# Patient Record
Sex: MALE | Race: WHITE | NOT HISPANIC OR LATINO | Employment: FULL TIME | URBAN - NONMETROPOLITAN AREA
[De-identification: names, ages, dates, MRNs, and addresses within clinical notes are randomized per-mention and may not be internally consistent; named-entity substitution may affect disease eponyms.]

---

## 2018-01-31 ENCOUNTER — DOCUMENTATION ONLY (OUTPATIENT)
Dept: FAMILY MEDICINE | Facility: OTHER | Age: 58
End: 2018-01-31

## 2018-01-31 RX ORDER — PERMETHRIN 50 MG/G
CREAM TOPICAL
COMMUNITY
Start: 2016-12-14 | End: 2021-05-27

## 2018-05-04 ENCOUNTER — OFFICE VISIT (OUTPATIENT)
Dept: FAMILY MEDICINE | Facility: OTHER | Age: 58
End: 2018-05-04
Attending: NURSE PRACTITIONER
Payer: COMMERCIAL

## 2018-05-04 VITALS
WEIGHT: 249.38 LBS | HEIGHT: 71 IN | DIASTOLIC BLOOD PRESSURE: 82 MMHG | SYSTOLIC BLOOD PRESSURE: 120 MMHG | BODY MASS INDEX: 34.91 KG/M2 | TEMPERATURE: 98 F | HEART RATE: 60 BPM
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
## 2018-05-04 PROCEDURE — 99213 OFFICE O/P EST LOW 20 MIN: CPT | Performed by: NURSE PRACTITIONER

## 2018-05-04 RX ORDER — DOXYCYCLINE 100 MG/1
200 CAPSULE ORAL ONCE
Qty: 2 CAPSULE | Refills: 0 | Status: SHIPPED | OUTPATIENT
Start: 2018-05-04 | End: 2018-05-04

## 2018-05-04 NOTE — NURSING NOTE
Patient presents to the clinic for tick bite on his back that has been there since Tuesday. Patient states his wife says its embedded and had a hard time getting the tick out.  Shannon ALLEN CMA.......5/4/2018..3:57 PM

## 2018-05-04 NOTE — MR AVS SNAPSHOT
After Visit Summary   5/4/2018    Melchor Sadler    MRN: [id number]           Patient Information     Date Of Birth          1960        Visit Information        Provider Department      5/4/2018 4:00 PM Melissa Barnes NP [facility]        Today's Diagnoses     Tick bite, initial encounter    -  1      Care Instructions    Per the CDC recommendations, a full course of antimicrobial treatment, as used in the treatment of active Lyme disease (i.e., 10-14 days), is NOT recommended for prevention of Lyme disease after a recognized tick bite in the absence of clinical symptoms. A single dose of doxycycline (200 mg) may be offered to adult patients and to children >8 years of age (4 mg/kg up to a maximum dose of 200 mg) when ALL of the following conditions exist.    1. The attached tick is a black-legged tick (deer tick, Ixodes scapularis). Tick identification is most accurately performed by an individual trained in this discipline. However, black-legged ticks are very common in [place] and there are many images available to help in general identification.    2. The tick has been attached for at least 36 hours. This determination is most reliably made by an entomologist, but simply asking the patient about outdoor activity in the time before the tick bite was noticed can often lead to an accurate estimate of attachment time. Unengorged (unfed) black-legged ticks are typically flat. Any deviation from this  flatness,  which is often accompanied by a change in color from brick red to a gray or brown, is an indication that the tick has been feeding.    3. Prophylaxis can be started within 72 hours of the time that the tick was removed. This time limit is suggested because of an absence of data on the efficacy of prophylaxis for tick bites following longer time intervals after tick removal.    4. Doxycycline treatment is not contraindicated. Doxycycline is contraindicated in pregnant  "women and children less than 8 years old. The other common antibiotic treatment for Lyme disease, amoxicillin, should NOT be used for prophylaxis because of an absence of data on an effective short-course regimen for prophylaxis and the likely need for a multiday regimen and its associated adverse effects.            Follow-ups after your visit        Who to contact     If you have questions or need follow up information about today's clinic visit or your schedule please contact [facility] [place] [facility] directly at [phone].  Normal or non-critical lab and imaging results will be communicated to you by [employer], letter or phone within 4 business days after the clinic has received the results. If you do not hear from us within 7 days, please contact the clinic through [employer] or phone. If you have a critical or abnormal lab result, we will notify you by phone as soon as possible.  Submit refill requests through [employer]ogogo or call your pharmacy and they will forward the refill request to us. Please allow 3 business days for your refill to be completed.          Additional Information About Your Visit        vogogo Information     [employer]ogo lets you send messages to your doctor, view your test results, renew your prescriptions, schedule appointments and more. To sign up, go to [email or web address] . Click on \"Log in\" on the left side of the screen, which will take you to the Welcome page. Then click on \"Sign up Now\" on the right side of the page.     You will be asked to enter the access code listed below, as well as some personal information. Please follow the directions to create your username and password.     Your access code is: [email or web address]  Expires: 2018  4:10 PM     Your access code will  in 90 days. If you need help or a new code, please call your [facility] or [phone].        Care EveryWhere ID     This is your Care EveryWhere ID. This could be used by other organizations to " "access your [facility] medical records  [id number]        Your Vitals Were     Pulse Temperature Height BMI (Body Mass Index)          60 98  F (36.7  C) (Tympanic) 5' 10.5\" (1.791 m) 35.28 kg/m2         Blood Pressure from Last 3 Encounters:   05/04/18 120/82   12/14/16 128/80   07/20/16 136/82    Weight from Last 3 Encounters:   05/04/18 249 lb 6 oz (113.1 kg)   12/14/16 238 lb 8 oz (108.2 kg)   07/20/16 231 lb 3.2 oz (104.9 kg)              Today, you had the following     No orders found for display         Today's Medication Changes          These changes are accurate as of 5/4/18  4:11 PM.  If you have any questions, ask your nurse or doctor.               Start taking these medicines.        Dose/Directions    doxycycline 100 MG capsule   Commonly known as:  VIBRAMYCIN   Used for:  Tick bite, initial encounter   Started by:  Melissa Barnes NP        Dose:  200 mg   Take 2 capsules (200 mg) by mouth once for 1 dose   Quantity:  2 capsule   Refills:  0            Where to get your medicines      These medications were sent to [facility] [address] [place] [id number]     Phone:  [phone]     doxycycline 100 MG capsule                Primary Care Provider Office Phone # Fax #    Gerda KATHLEEN Andrez Schaefer [phone]       [address] [facility] [address]        Equal Access to Services     [facility] Hospital[facility]AN DAVID AH: Hadii loreta ku hadasho Soomaali, waaxda luqadaha, qaybta kaalmada adeegyada, liliana scales . So [facility] [phone].    ATENCIÓN: Si ramonla lauren, tiene a whitlock disposición servicios gratuitos de asistencia lingüística. Llame al [phone].    We comply with applicable federal civil rights laws and [place] laws. We do not discriminate on the basis of race, color, national origin, age, disability, sex, sexual orientation, or gender identity.            Thank you!     Thank you for " choosing [facility]  for your care. Our goal is always to provide you with excellent care. Hearing back from our patients is one way we can continue to improve our services. Please take a few minutes to complete the written survey that you may receive in the mail after your visit with us. Thank you!             Your Updated Medication List - Protect others around you: Learn how to safely use, store and throw away your medicines at www.disposemymeds.org.          This list is accurate as of 5/4/18  4:11 PM.  Always use your most recent med list.                   Brand Name Dispense Instructions for use Diagnosis    doxycycline 100 MG capsule    VIBRAMYCIN    2 capsule    Take 2 capsules (200 mg) by mouth once for 1 dose    Tick bite, initial encounter       permethrin 5 % cream    ELIMITE     Apply as directed from neck down and leave on over night, can retreat in 2 weeks if needed

## 2018-05-04 NOTE — PROGRESS NOTES
"Nursing Notes:   Shannon Concepcion CMA  5/4/2018  4:09 PM  Signed  Patient presents to the clinic for tick bite on his back that has been there since Tuesday. Patient states his wife says its embedded and had a hard time getting the tick out.  Shannon ALLEN CMA.......5/4/2018..3:57 PM      SUBJECTIVE:   Melchor Sadler is a 57 year old male who presents to clinic today for the following health issues:    Deer tick bite      Duration: Feels it was attached maybe since Tuesday. Wife removed a non engorged deer tick today     Description (location/character/radiation): Bite located on RT lower scapula area.     Intensity:  mild    Accompanying signs and symptoms: Denies fevers, chills, cough, s/s of systemic illness.     History (similar episodes/previous evaluation): None    Precipitating or alleviating factors: None    Therapies tried and outcome: None         Problem list and histories reviewed & adjusted, as indicated.  Additional history: as documented    Current Outpatient Prescriptions   Medication Sig Dispense Refill     doxycycline (VIBRAMYCIN) 100 MG capsule Take 2 capsules (200 mg) by mouth once for 1 dose 2 capsule 0     permethrin (ELIMITE) 5 % cream Apply as directed from neck down and leave on over night, can retreat in 2 weeks if needed       Allergies   Allergen Reactions     Codeine Other (See Comments)     Anxiety     Penicillins Anxiety       Reviewed and updated as needed this visit by clinical staff  Tobacco  Allergies  Meds  Med Hx  Surg Hx  Fam Hx  Soc Hx      Reviewed and updated as needed this visit by Provider         ROS:  A comprehensive 10 point ROS was obtained and documented for notable findings in the HPI.       OBJECTIVE:     /82 (BP Location: Left arm, Patient Position: Sitting, Cuff Size: Adult Large)  Pulse 60  Temp 98  F (36.7  C) (Tympanic)  Ht 5' 10.5\" (1.791 m)  Wt 249 lb 6 oz (113.1 kg)  BMI 35.28 kg/m2  Body mass index is 35.28 kg/(m^2).  GENERAL: healthy, alert and no " distress  EYES: Eyes grossly normal to inspection  RESP: non labored  SKIN: Erythemic papule on the LT lower edge of the scapula.     Diagnostic Test Results:  none     ASSESSMENT/PLAN:     1. Tick bite, initial encounter  - doxycycline (VIBRAMYCIN) 100 MG capsule; Take 2 capsules (200 mg) by mouth once for 1 dose  Dispense: 2 capsule; Refill: 0    Medical Decision Making:    Differential Diagnosis:  None    Serious Comorbid Conditions:  Adult:  None    PLAN:    Rash:  antibiotic  OTC hydrocortisone prn  moisturizer  Reassurance was given to the patient    Followup:    If not improving or if condition worsens, follow up with your Primary Care Provider        Melissa Barnes NP, 5/4/2018 4:09 PM

## 2018-05-04 NOTE — PATIENT INSTRUCTIONS
Per the CDC recommendations, a full course of antimicrobial treatment, as used in the treatment of active Lyme disease (i.e., 10-14 days), is NOT recommended for prevention of Lyme disease after a recognized tick bite in the absence of clinical symptoms. A single dose of doxycycline (200 mg) may be offered to adult patients and to children >8 years of age (4 mg/kg up to a maximum dose of 200 mg) when ALL of the following conditions exist.    1. The attached tick is a black-legged tick (deer tick, Ixodes scapularis). Tick identification is most accurately performed by an individual trained in this discipline. However, black-legged ticks are very common in [place] and there are many images available to help in general identification.    2. The tick has been attached for at least 36 hours. This determination is most reliably made by an entomologist, but simply asking the patient about outdoor activity in the time before the tick bite was noticed can often lead to an accurate estimate of attachment time. Unengorged (unfed) black-legged ticks are typically flat. Any deviation from this  flatness,  which is often accompanied by a change in color from brick red to a gray or brown, is an indication that the tick has been feeding.    3. Prophylaxis can be started within 72 hours of the time that the tick was removed. This time limit is suggested because of an absence of data on the efficacy of prophylaxis for tick bites following longer time intervals after tick removal.    4. Doxycycline treatment is not contraindicated. Doxycycline is contraindicated in pregnant women and children less than 8 years old. The other common antibiotic treatment for Lyme disease, amoxicillin, should NOT be used for prophylaxis because of an absence of data on an effective short-course regimen for prophylaxis and the likely need for a multiday regimen and its associated adverse effects.

## 2021-05-02 ENCOUNTER — ALLIED HEALTH/NURSE VISIT (OUTPATIENT)
Dept: FAMILY MEDICINE | Facility: OTHER | Age: 61
End: 2021-05-02
Attending: FAMILY MEDICINE
Payer: COMMERCIAL

## 2021-05-02 DIAGNOSIS — R43.0 LOSS OF SENSE OF SMELL: ICD-10-CM

## 2021-05-02 DIAGNOSIS — R05.9 COUGH: Primary | ICD-10-CM

## 2021-05-02 LAB
SARS-COV-2 RNA RESP QL NAA+PROBE: NORMAL
SPECIMEN SOURCE: NORMAL

## 2021-05-02 PROCEDURE — U0003 INFECTIOUS AGENT DETECTION BY NUCLEIC ACID (DNA OR RNA); SEVERE ACUTE RESPIRATORY SYNDROME CORONAVIRUS 2 (SARS-COV-2) (CORONAVIRUS DISEASE [COVID-19]), AMPLIFIED PROBE TECHNIQUE, MAKING USE OF HIGH THROUGHPUT TECHNOLOGIES AS DESCRIBED BY CMS-2020-01-R: HCPCS | Mod: ZL | Performed by: FAMILY MEDICINE

## 2021-05-02 PROCEDURE — C9803 HOPD COVID-19 SPEC COLLECT: HCPCS

## 2021-05-02 PROCEDURE — U0005 INFEC AGEN DETEC AMPLI PROBE: HCPCS | Mod: ZL | Performed by: FAMILY MEDICINE

## 2021-05-03 ENCOUNTER — TELEPHONE (OUTPATIENT)
Dept: FAMILY MEDICINE | Facility: OTHER | Age: 61
End: 2021-05-03

## 2021-05-03 LAB
LABORATORY COMMENT REPORT: ABNORMAL
SARS-COV-2 RNA RESP QL NAA+PROBE: POSITIVE
SPECIMEN SOURCE: ABNORMAL

## 2021-05-03 NOTE — TELEPHONE ENCOUNTER
"-Coronavirus (COVID-19) Notification    Caller Name (Patient, parent, daughter/son, grandparent, etc)  Melchor Sadler    Reason for call  Notify of Positive Coronavirus (COVID-19) lab results, assess symptoms,  review  [facility] recommendations    Lab Result    Lab test:  2019-nCoV rRt-PCR or SARS-CoV-2 PCR    Oropharyngeal AND/OR nasopharyngeal swabs is POSITIVE for 2019-nCoV RNA/SARS-COV-2 PCR (COVID-19 virus)    RN Recommendations/Instructions per [facility] Coronavirus COVID-19 recommendations    Brief introduction script  Introduce self then review script:  \"I am calling on behalf of [employer].  We were notified that your Coronavirus test (COVID-19) for was POSITIVE for the virus.  I have some information to relay to you but first I wanted to mention that the [place] Dept of Health will be contacting you shortly [it's possible MD already called Patient] to talk to you more about how you are feeling and other people you have had contact with who might now also have the virus.  Also,  [employer] is Partnering with the [facility] for Covid-19 research, you may be contacted directly by research staff.\"    Assessment (Inquire about Patient's current symptoms)   Assessment   Current Symptoms at time of phone call: (if no symptoms, document No symptoms] Fatigue, shortness of breath, chest feels heaving, cough, loss taste and smeil   Symptoms onset (if applicable) 4/23     If at time of call, Patients symptoms hare worsened, the Patient should contact 911 or have someone drive them to Emergency Dept promptly:      If Patient calling 911, inform 911 personal that you have tested positive for the Coronavirus (COVID-19).  Place mask on and await 911 to arrive.    If Emergency Dept, If possible, please have another adult drive you to the Emergency Dept but you need to wear mask when in contact with other people.      Monoclonal Antibody Administration    You may be eligible to receive a new " "treatment with a monoclonal antibody for preventing hospitalization in patients at high risk for complications from COVID-19.   This medication is still experimental and available on a limited basis; it is given through an IV and must be given at an infusion center. Please note that not all people who are eligible will receive the medication since it is in limited supply.     Are you interested in being considered for this medication?  No.   Does the patient fit the criteria: Patient declined    If patient qualifies based on above criteria:  \"You will be contacted if you are selected to receive this treatment in the next 1-2 business days.   This is time sensitive and if you are not selected in the next 1-2 business days, you will not receive the medication.  If you do not receive a call to schedule, you have not been selected.\"      Review information with Patient    Your result was positive. This means you have COVID-19 (coronavirus).  We have sent you a letter that reviews the information that I'll be reviewing with you now.    How can I protect others?    If you have symptoms: stay home and away from others (self-isolate) until:    You've had no fever--and no medicine that reduces fever--for 1 full day (24 hours). And       Your other symptoms have gotten better. For example, your cough or breathing has improved. And     At least 10 days have passed since your symptoms started. (If you've been told by a doctor that you have a weak immune system, wait 20 days.)     If you don't have symptoms: Stay home and away from others (self-isolate) until at least 10 days have passed since your first positive COVID-19 test. (Date test collected)    During this time:    Stay in your own room, including for meals. Use your own bathroom if you can.    Stay away from others in your home. No hugging, kissing or shaking hands. No visitors.     Don't go to work, school or anywhere else.     Clean  high touch  surfaces often " (doorknobs, counters, handles, etc.). Use a household cleaning spray or wipes. You'll find a full list on the EPA website at www.epa.gov/pesticide-registration/list-n-disinfectants-use-against-sars-cov-2.     Cover your mouth and nose with a mask, tissue or other face covering to avoid spreading germs.    Wash your hands and face often with soap and water.    Make a list of people you have been in close contact with recently, even if either of you wore a face covering.   ; Start your list from 2 days before you became ill or had a positive test.  ; Include anyone that was within 6 feet of you for a cumulative total of 15 minutes or more in 24 hours. (Example: if you sat next to Mandeep for 5 minutes in the morning and 10 minutes in the afternoon, then you were in close contact for 15 minutes total that day. Mandeep would be added to your list.)    A public health worker will call or text you. It is important that you answer. They will ask you questions about possible exposures to COVID-19, such as people you have been in direct contact with and places you have visited.    Tell the people on your list that you have COVID-19; they should stay away from others for 14 days starting from the last time they were in contact with you (unless you are told something different from a public health worker).     Caregivers in these groups are at risk for severe illness due to COVID-19:  o People 65 years and older  o People who live in a nursing home or long-term care facility  o People with chronic disease (lung, heart, cancer, diabetes, kidney, liver, immunologic)  o People who have a weakened immune system, including those who:  - Are in cancer treatment  - Take medicine that weakens the immune system, such as corticosteroids  - Had a bone marrow or organ transplant  - Have an immune deficiency  - Have poorly controlled HIV or AIDS  - Are obese (body mass index of 40 or higher)  - Smoke regularly    Caregivers should wear gloves  while washing dishes, handling laundry and cleaning bedrooms and bathrooms.    Wash and dry laundry with special caution. Don't shake dirty laundry, and use the warmest water setting you can.    If you have a weakened immune system, ask your doctor about other actions you should take.    For more tips, go to www.cdc.gov/coronavirus/2019-ncov/downloads/10Things.pdf.    You should not go back to work until you meet the guidelines above for ending your home isolation. You don't need to be retested for COVID-19 before going back to work--studies show that you won't spread the virus if it's been at least 10 days since your symptoms started (or 20 days, if you have a weak immune system).    Employers: This document serves as formal notice of your employee's medical guidelines for going back to work. They must meet the above guidelines before going back to work in person.    How can I take care of myself?    1. Get lots of rest. Drink extra fluids (unless a doctor has told you not to).    2. Take Tylenol (acetaminophen) for fever or pain. If you have liver or kidney problems, ask your family doctor if it's okay to take Tylenol.     Take either:     650 mg (two 325 mg pills) every 4 to 6 hours, or     1,000 mg (two 500 mg pills) every 8 hours as needed.     Note: Don't take more than 3,000 mg in one day. Acetaminophen is found in many medicines (both prescribed and over-the-counter medicines). Read all labels to be sure you don't take too much.    For children, check the Tylenol bottle for the right dose (based on their age or weight).    3. If you have other health problems (like cancer, heart failure, an organ transplant or severe kidney disease): Call your specialty clinic if you don't feel better in the next 2 days.    4. Know when to call 911: Emergency warning signs include:    Trouble breathing or shortness of breath    Pain or pressure in the chest that doesn't go away    Feeling confused like you haven't felt  before, or not being able to wake up    Bluish-colored lips or face    5. Sign up for [employer]. We know it's scary to hear that you have COVID-19. We want to track your symptoms to make sure you're okay over the next 2 weeks. Please look for an email from [employer]--this is a free, online program that we'll use to keep in touch. To sign up, follow the link in the email. Learn more at [email or web address].    Where can I get more information?    [facility]: [email or web address]    Coronavirus Basics: www.health.[facility].[place]./diseases/coronavirus/basics.html    What to Do If You're Sick: www.cdc.gov/coronavirus/2019-ncov/about/steps-when-sick.html    Ending Home Isolation: www.cdc.gov/coronavirus/2019-ncov/hcp/disposition-in-home-patients.html     Caring for Someone with COVID-19: www.cdc.gov/coronavirus/2019-ncov/if-you-are-sick/care-for-someone.html     [facility] clinical trials (COVID-19 research studies): [email or web address].[email or web address]-[email or web address]-trials     A Positive COVID-19 letter will be sent via [employer] or the mail. (Exception, no letters sent to Presurgerical/Preprocedure Patients)  Recommended to patient that he contact his primary Dr for further advise regarding the shortness of air he is feeling, patient is a non-smoker and has had the cough since 4/23/21  Zuly Gamez LPN

## 2021-05-03 NOTE — TELEPHONE ENCOUNTER
Coronavirus (COVID-19) Notification    Reason for call  Notify of POSITIVE  COVID-19 lab result, assess symptoms,  review [facility] recommendations    Lab Result   Lab test for 2019-nCoV rRt-PCR or SARS-COV-2 PCR  Oropharyngeal AND/OR nasopharyngeal swabs were POSITIVE for 2019-nCoV RNA [OR] SARS-COV-2 RNA (COVID-19) RNA     We have been unable to reach Patient by phone at this time to notify of their Positive COVID-19 result.  Left voicemail message requesting a call back to [phone] [facility] for results.        POSITIVE COVID-19 Letter sent.    Lissette Perez LPN

## 2021-05-27 ENCOUNTER — OFFICE VISIT (OUTPATIENT)
Dept: FAMILY MEDICINE | Facility: OTHER | Age: 61
End: 2021-05-27
Attending: NURSE PRACTITIONER
Payer: COMMERCIAL

## 2021-05-27 VITALS
SYSTOLIC BLOOD PRESSURE: 134 MMHG | RESPIRATION RATE: 12 BRPM | DIASTOLIC BLOOD PRESSURE: 90 MMHG | WEIGHT: 229.9 LBS | HEART RATE: 78 BPM | BODY MASS INDEX: 32.91 KG/M2 | HEIGHT: 70 IN | TEMPERATURE: 97.3 F

## 2021-05-27 DIAGNOSIS — R07.0 THROAT PAIN: Primary | ICD-10-CM

## 2021-05-27 LAB
SPECIMEN SOURCE: NORMAL
STREP GROUP A PCR: NOT DETECTED

## 2021-05-27 PROCEDURE — 87651 STREP A DNA AMP PROBE: CPT | Mod: ZL | Performed by: NURSE PRACTITIONER

## 2021-05-27 PROCEDURE — 99202 OFFICE O/P NEW SF 15 MIN: CPT | Performed by: NURSE PRACTITIONER

## 2021-05-27 ASSESSMENT — MIFFLIN-ST. JEOR: SCORE: 1864.07

## 2021-05-27 ASSESSMENT — PAIN SCALES - GENERAL: PAINLEVEL: MILD PAIN (3)

## 2021-05-27 NOTE — NURSING NOTE
"Chief Complaint   Patient presents with     Pharyngitis     2-3 days     Patient stated his throat has been sore for about 2-3 days now. Notices that the left gland is swollen and has some blood and mucus on it. Has a Hx of tonsillitis. States his throat is feeling a bit better today.    Initial BP (!) 134/90 (BP Location: Right arm, Patient Position: Sitting, Cuff Size: Adult Regular)   Pulse 78   Temp 97.3  F (36.3  C) (Tympanic)   Resp 12   Ht 1.778 m (5' 10\")   Wt 104.3 kg (229 lb 14.4 oz)   BMI 32.99 kg/m   Estimated body mass index is 32.99 kg/m  as calculated from the following:    Height as of this encounter: 1.778 m (5' 10\").    Weight as of this encounter: 104.3 kg (229 lb 14.4 oz).  Medication Reconciliation: Completed     Vj Mckeon LPN  "

## 2021-05-27 NOTE — PROGRESS NOTES
ASSESSMENT/PLAN:    I have reviewed the nursing notes.  I have reviewed the findings, diagnosis, plan and need for follow up with the patient.    1. Throat pain  - Group A Streptococcus PCR Throat Swab was negative tonight.   -Discussed with patient that symptoms and exam are consistent with viral illness or seasonal allergies. Recommended trying claritan or zyrtec for allergy symptoms relief.   -If strep is negative, try claritan for seasonal allergies and follow up if symptoms worsen or persist >10 days       -Symptomatic treatment - Encouraged fluids, salt water gargles, honey, elevation, humidifier, sinus rinse/netti pot, lozenges, tea, topical vapor rub, popsicles, rest, etc     -May use over-the-counter Tylenol or ibuprofen PRN    Discussed warning signs/symptoms indicative of need to f/u    Follow up if symptoms persist or worsen or concerns    I explained my diagnostic considerations and recommendations to the patient, who voiced understanding and agreement with the treatment plan. All questions were answered. We discussed potential side effects of any prescribed or recommended therapies, as well as expectations for response to treatments.    Natividad Braswell NP  5/27/2021  5:31 PM    HPI:  Melchor Sadler is a 60 year old male who presents to [facility] today for c/o sore throat and swollen gland on left side of throat 3 days, and some green nasal drainage. Denies fever/chills, ear pain, sinus pain/pressure, cough. Has hx tonsillitis in the past and tonsil stones but none now. He did have covid at beginning of the month but has since recovered from that without lingering symptoms. Otherwise negative review of systems. He would like to be swabbed for group A strep today. He is a [employer]  and exposed to children without a mask in [employer] on Sunday.     No past medical history on file.  No past surgical history on file.  Social History     Tobacco Use     Smoking status: Never Smoker     Smokeless  "tobacco: Never Used   Substance Use Topics     Alcohol use: No     Alcohol/week: 0.0 standard drinks     No current outpatient medications on file.     Allergies   Allergen Reactions     Codeine Other (See Comments)     Anxiety     Penicillins Anxiety     Past medical history, past surgical history, current medications and allergies reviewed and accurate to the best of my knowledge.      ROS:  Refer to HPI    BP (!) 134/90 (BP Location: Right arm, Patient Position: Sitting, Cuff Size: Adult Regular)   Pulse 78   Temp 97.3  F (36.3  C) (Tympanic)   Resp 12   Ht 1.778 m (5' 10\")   Wt 104.3 kg (229 lb 14.4 oz)   BMI 32.99 kg/m      EXAM:  General Appearance: Well appearing 60 year old male, appropriate appearance for age. No acute distress  Ears: Left TM intact, translucent with bony landmarks appreciated, no erythema, no effusion, no bulging, no purulence.  Right TM intact, translucent with bony landmarks appreciated, no erythema, no effusion, no bulging, no purulence.  Left auditory canal clear.  Right auditory canal clear.  Normal external ears, non tender.  Eyes: conjunctivae normal without erythema or irritation, corneas clear, no drainage or crusting, no eyelid swelling, pupils equal   Orophayrnx: erythema of posterior pharynx and tonsillar hypertrophy is present bilaterally. no exudates or petechiae, no post nasal drip seen, no trismus, voice clear.   moist mucous membranes.  Sinuses:  No sinus tenderness upon palpation of the frontal or maxillary sinuses  Nose:  Bilateral nares: no erythema, no edema, no drainage or congestion   Neck: one cervical lymph node is palpable on the left, otherwise supple  Respiratory: normal chest wall and respirations.  Normal effort.  Clear to auscultation bilaterally, no wheezing, crackles or rhonchi.  No increased work of breathing.  No cough appreciated.  Cardiac: RRR with no murmurs  Abdomen: soft, nontender, no rigidity, no rebound tenderness or guarding, normal bowel " sounds present  Musculoskeletal:  Equal movement of bilateral upper extremities.  Equal movement of bilateral lower extremities.  Normal gait.    Dermatological: no rashes noted of exposed skin  Psychological: normal affect, alert, oriented, and pleasant.     Labs:  Strep swab    Xray:  n/a

## 2022-07-22 ENCOUNTER — HOSPITAL ENCOUNTER (EMERGENCY)
Facility: OTHER | Age: 62
Discharge: HOME | End: 2022-07-22
Attending: EMERGENCY MEDICINE | Admitting: EMERGENCY MEDICINE
Payer: COMMERCIAL

## 2022-07-22 ENCOUNTER — APPOINTMENT (OUTPATIENT)
Dept: GENERAL RADIOLOGY | Facility: OTHER | Age: 62
End: 2022-07-22
Attending: EMERGENCY MEDICINE
Payer: COMMERCIAL

## 2022-07-22 VITALS
WEIGHT: 220 LBS | HEART RATE: 63 BPM | RESPIRATION RATE: 16 BRPM | BODY MASS INDEX: 32.58 KG/M2 | HEIGHT: 69 IN | DIASTOLIC BLOOD PRESSURE: 78 MMHG | TEMPERATURE: 97.9 F | SYSTOLIC BLOOD PRESSURE: 136 MMHG | OXYGEN SATURATION: 94 %

## 2022-07-22 DIAGNOSIS — M25.522 LEFT ELBOW PAIN: ICD-10-CM

## 2022-07-22 DIAGNOSIS — S22.32XA CLOSED FRACTURE OF ONE RIB OF LEFT SIDE, INITIAL ENCOUNTER: ICD-10-CM

## 2022-07-22 DIAGNOSIS — S01.01XA LACERATION OF SCALP WITHOUT FOREIGN BODY, INITIAL ENCOUNTER: ICD-10-CM

## 2022-07-22 DIAGNOSIS — R07.81 RIB PAIN ON LEFT SIDE: ICD-10-CM

## 2022-07-22 PROCEDURE — 12002 RPR S/N/AX/GEN/TRNK2.6-7.5CM: CPT | Performed by: EMERGENCY MEDICINE

## 2022-07-22 PROCEDURE — 250N000013 HC RX MED GY IP 250 OP 250 PS 637: Performed by: EMERGENCY MEDICINE

## 2022-07-22 PROCEDURE — 99283 EMERGENCY DEPT VISIT LOW MDM: CPT | Mod: 25 | Performed by: EMERGENCY MEDICINE

## 2022-07-22 PROCEDURE — 71101 X-RAY EXAM UNILAT RIBS/CHEST: CPT | Mod: TC,LT

## 2022-07-22 PROCEDURE — 250N000009 HC RX 250: Performed by: EMERGENCY MEDICINE

## 2022-07-22 PROCEDURE — 99284 EMERGENCY DEPT VISIT MOD MDM: CPT | Mod: 25 | Performed by: EMERGENCY MEDICINE

## 2022-07-22 RX ORDER — ACETAMINOPHEN 325 MG/1
975 TABLET ORAL ONCE
Status: COMPLETED | OUTPATIENT
Start: 2022-07-22 | End: 2022-07-22

## 2022-07-22 RX ADMIN — Medication: at 22:12

## 2022-07-22 RX ADMIN — ACETAMINOPHEN 975 MG: 325 TABLET, FILM COATED ORAL at 22:12

## 2022-07-22 RX ADMIN — IBUPROFEN 600 MG: 200 TABLET, FILM COATED ORAL at 22:12

## 2022-07-23 NOTE — ED PROVIDER NOTES
Emergency Department Provider Note  : 1960 Age: 61 year old Sex: male MRN: [id number]    Chief Complaint   Patient presents with     Head Laceration       Medical Decision Making / Assessment / Plan   61 year old male presenting with head laceration and left lower rib pain status post a fall.  Chest x-ray and rib x-ray show fracture of left rib 12.  His head laceration was cleaned and repaired.  The patient was managed with Tylenol and ibuprofen.  He was discharged with an incentive spirometer and encouraged to use Tylenol and ibuprofen as needed.  He declined a prescription for a stronger pain medicine.  He will need his staples out in approximately 7 days      Final diagnoses:   Laceration of scalp without foreign body, initial encounter   Rib pain on left side   Left elbow pain   Closed fracture of one rib of left side, initial encounter       Jose Magaña MD  2022   Emergency Department    Subjective   Melchor is a 61 year old male who presents at 10:02 PM with a laceration to the left side of his head and left-sided lower rib pain.  Patient was working on his deck and a board broke and he fell through the deck.  He struck his left lower ribs on a joist, struck his left elbow and cut his head.  He complains of severe pain to his left lower ribs.  Denies any LOC, denies any blood thinners, denies any medications or medical problems.  Has not had any pain medicine prior to arrival in the ER.  Complains of pain around the head laceration but no global headache.  Complains of mild pain around his left elbow.     His chest x-ray was negative for pneumothorax.  He did not have any abdominal tenderness so I have low suspicion for a splenic injury or other intra-abdominal trauma.    I have reviewed the Medications, Allergies, Past Medical and Surgical History, and Social History in the [facility] and with family.    Review of Systems:  See HPI for pertinent positives and negatives. All other systems  "reviewed and found to be negative.      Objective   BP: 136/78  Pulse: 63  Temp: 97.9  F (36.6  C)  Resp: 16  Height: 175.3 cm (5' 9\")  Weight: 99.8 kg (220 lb)  SpO2: 94 %    Physical Exam:   General: awake and alert, SO at bedside, guarded movements of torso  Head: normocephalic, atraumatic  Eyes:conjugate gaze  ENT: moist membranes  Chest/Respiratory: normal resp effort, TTP L lower posterior ribs, pain with AP compression, no pain with lateral compression  Cardiovascular: warm and well perfused  Back: no C,T,L spine TTP  Abdominal: soft, non-distended, non-tender  Extremities: mobility at baseline  Neurological: moving all extremities, normal speech, gait at baseline  Skin: approx 4cm laceration to L parietal scalp; superficial abrasion and bruising to posterior proximal forearm  Psychiatric: appropriate affect        Medical/Surgical History:  No past medical history on file.  No past surgical history on file.    Medications:  No current facility-administered medications for this encounter.     No current outpatient medications on file.       Allergies:  Codeine and Penicillins    Relevant labs, images, EKGs, Epic and outside hospital (if applicable) charts were reviewed. The findings, diagnosis, plan, and need for follow up were discussed with the patient/family. Nursing notes were reviewed.        Jose Magaña MD  07/22/22 3458    "

## 2022-07-23 NOTE — ED TRIAGE NOTES
"Pt fell through deck at home.  Has laceration on left side of head, and multiple abrasions on left side and arm.  Pt states \"Side hurts more than head.\"  Bleeding controlled, denies loss of consciousness or shortness of breath.      Triage Assessment     Row Name 07/22/22 5444       Triage Assessment (Adult)    Airway WDL WDL       Respiratory WDL    Respiratory WDL WDL       Skin Circulation/Temperature WDL    Skin Circulation/Temperature WDL WDL       Cardiac WDL    Cardiac WDL WDL       Peripheral/Neurovascular WDL    Peripheral Neurovascular WDL WDL       Cognitive/Neuro/Behavioral WDL    Cognitive/Neuro/Behavioral WDL WDL              "

## 2022-07-23 NOTE — ED PROVIDER NOTES
[facility]    -Laceration Repair    Date/Time: 7/22/2022 10:48 PM  Performed by: Jose Magaña MD  Authorized by: Jose Magaña MD     Risks, benefits and alternatives discussed.      ANESTHESIA (see MAR for exact dosages):     Anesthesia method:  Local infiltration    Local anesthetic:  Lidocaine 1% w/o epi  LACERATION DETAILS     Location:  Scalp    Scalp location:  L parietal    Length (cm):  4    REPAIR TYPE:     Repair type:  Simple      EXPLORATION:     Hemostasis achieved with:  Direct pressure    TREATMENT:     Area cleansed with:  Soap and water    Amount of cleaning:  Standard    Irrigation solution:  Tap water    Irrigation volume:  1L    SKIN REPAIR     Repair method:  Staples    Number of staples:  6    APPROXIMATION     Approximation:  Close    POST-PROCEDURE DETAILS     Dressing:  Antibiotic ointment        PROCEDURE    Patient Tolerance:  Patient tolerated the procedure well with no immediate complications         Jose Maagña MD  07/22/22 4129

## 2022-07-29 ENCOUNTER — ALLIED HEALTH/NURSE VISIT (OUTPATIENT)
Dept: FAMILY MEDICINE | Facility: OTHER | Age: 62
End: 2022-07-29
Attending: FAMILY MEDICINE
Payer: COMMERCIAL

## 2022-07-29 DIAGNOSIS — Z48.02 ENCOUNTER FOR REMOVAL OF SUTURES: Primary | ICD-10-CM

## 2022-07-29 NOTE — PROGRESS NOTES
Melchor Sadler presents to the clinic for removal of staples. The patient has had staples in place for 7 days. There has been no patient reported signs or symptoms of infection or drainage. 6  staples are seen and located on the HEAD. Tetanus status is up to date. All staples were easily removed today. Routine wound care discussed by the RN or provider. The patient will follow up as needed.    Suha Will RN on 7/29/2022 at 10:30 AM

## 2022-08-04 ENCOUNTER — OFFICE VISIT (OUTPATIENT)
Dept: FAMILY MEDICINE | Facility: OTHER | Age: 62
End: 2022-08-04
Attending: FAMILY MEDICINE
Payer: COMMERCIAL

## 2022-08-04 VITALS
WEIGHT: 226.3 LBS | BODY MASS INDEX: 33.52 KG/M2 | HEART RATE: 70 BPM | TEMPERATURE: 98.7 F | RESPIRATION RATE: 16 BRPM | SYSTOLIC BLOOD PRESSURE: 126 MMHG | DIASTOLIC BLOOD PRESSURE: 90 MMHG | OXYGEN SATURATION: 95 % | HEIGHT: 69 IN

## 2022-08-04 DIAGNOSIS — S22.32XD CLOSED FRACTURE OF ONE RIB OF LEFT SIDE WITH ROUTINE HEALING, SUBSEQUENT ENCOUNTER: Primary | ICD-10-CM

## 2022-08-04 DIAGNOSIS — S01.01XD LACERATION OF SCALP WITHOUT FOREIGN BODY, SUBSEQUENT ENCOUNTER: ICD-10-CM

## 2022-08-04 PROCEDURE — 99215 OFFICE O/P EST HI 40 MIN: CPT | Performed by: FAMILY MEDICINE

## 2022-08-04 ASSESSMENT — ENCOUNTER SYMPTOMS
DIZZINESS: 0
ARTHRALGIAS: 0
SHORTNESS OF BREATH: 0
PARESTHESIAS: 0
NUMBNESS: 0
LIGHT-HEADEDNESS: 0
JOINT SWELLING: 0
COUGH: 0
HEADACHES: 0
WEAKNESS: 0

## 2022-08-04 ASSESSMENT — PAIN SCALES - GENERAL: PAINLEVEL: MILD PAIN (3)

## 2022-08-04 NOTE — PROGRESS NOTES
Assessment & Plan     Closed fracture of one rib of left side with routine healing, subsequent encounter  Will likely take six weeks for full healing.  Based on his job description, he is not able to perform his normal duties.  FMLA and Sick Leave paperwork completed.  Please see scanned documents for detail.  He may return to work on 9/2/22 if symptoms resolved.  If he does not feel that his symptoms are completely resolved, he needs to return to clinic for further evaluation.    Laceration of scalp without foreign body, subsequent encounter  Well-healed.  No complications.  No lingering symptoms.    50 minutes spent on the date of the encounter doing chart review, history and exam, documentation and further activities per the note    Rula Medina,   [facility]    Subjective   Melchor is a 61 year old accompanied by his spouse, presenting for the following health issues:  RECHECK (Return to work)    History of Present Illness       Reason for visit:  Fractured ribs    He eats 2-3 servings of fruits and vegetables daily.He consumes 0 sweetened beverage(s) daily.He exercises with enough effort to increase his heart rate 9 or less minutes per day.  He exercises with enough effort to increase his heart rate 3 or less days per week.   He is taking medications regularly.       Pain History:  When did you first notice your pain? - Acute Pain   Have you seen anyone else for your pain? Yes in ER  Where in your body do you have pain? Back Pain  Onset/Duration: 07/22/2022  Description:   Location of pain: middle of back left  Character of pain: dull ache and intermittent  Pain radiation: none  New numbness or weakness in legs, not attributed to pain: no   Intensity: Currently 3/10, mild  Progression of Symptoms: improving  History:   Specific cause: turning/bending  Pain interferes with job: YES  History of back problems: no prior back problems  Any previous MRI or X-rays: Yes- at [facility].  Date  "7/22/2022  Sees a specialist for back pain: No  Alleviating factors:   Improved by: cold and sitting    Precipitating factors:  Worsened by: Lifting and Bending  Therapies tried and outcome: cold, NSAIDs and sitting    Accompanying Signs & Symptoms:  Risk of Cauda Equina: None  Risk of Infection: None  Risk of Cancer: None  Risk of Ankylosing Spondylitis: Onset at age <35, male, AND morning back stiffness No    Two weeks ago fell through his deck.  He fractured his left twelfth rib and got a head laceration which required staples.  He needs paperwork completed for his time away from work.  He is employed as a  at [employer], a job which requires him to exert up to 50 pounds of force occasionally, 40 pounds of force frequently, and 10 pounds of force constantly.  He stands for 10 hours per shift.  He has continued to have left side pain from his fractured rib.  Pain is present with movement, particularly getting out of bed, getting dressed, tying his shoes, and getting in and out of his truck.  He reports that his head injury has completely healed.  He has had his staples removed.  He has no lingering symptoms from his head injury.    Review of Systems   Respiratory: Negative for cough and shortness of breath.    Cardiovascular: Negative for chest pain.   Musculoskeletal: Negative for arthralgias and joint swelling.   Neurological: Negative for dizziness, weakness, light-headedness, numbness, headaches and paresthesias.          Objective    BP (!) 130/90   Pulse 70   Temp 98.7  F (37.1  C) (Tympanic)   Resp 16   Ht 1.753 m (5' 9\")   Wt 102.6 kg (226 lb 4.8 oz)   SpO2 95%   BMI 33.42 kg/m    Body mass index is 33.42 kg/m .  Physical Exam  Constitutional:       General: He is not in acute distress.     Appearance: Normal appearance. He is not ill-appearing.   Eyes:      Extraocular Movements: Extraocular movements intact.      Pupils: Pupils are equal, round, and reactive to light. "   Cardiovascular:      Rate and Rhythm: Normal rate and regular rhythm.      Heart sounds: No murmur heard.    No friction rub. No gallop.   Pulmonary:      Effort: Pulmonary effort is normal.      Breath sounds: Normal breath sounds. No wheezing, rhonchi or rales.   Chest:      Comments: Tenderness to palpation of left side in area of twelfth rib.  Skin:     Comments: Well-healed laceration of left anterior aspect of scalp.   Neurological:      General: No focal deficit present.      Mental Status: He is alert.      Cranial Nerves: No cranial nerve deficit.   Psychiatric:         Mood and Affect: Mood normal.

## 2022-08-04 NOTE — NURSING NOTE
"Chief Complaint   Patient presents with     RECHECK     Return to work         Initial BP (!) 126/90   Pulse 70   Temp 98.7  F (37.1  C) (Tympanic)   Resp 16   Ht 1.753 m (5' 9\")   Wt 102.6 kg (226 lb 4.8 oz)   SpO2 95%   BMI 33.42 kg/m   Estimated body mass index is 33.42 kg/m  as calculated from the following:    Height as of this encounter: 1.753 m (5' 9\").    Weight as of this encounter: 102.6 kg (226 lb 4.8 oz).     Advance Care Directive on file? no  Advance Care Directive provided to patient? no    FOOD SECURITY SCREENING QUESTIONS:    The next two questions are to help us understand your food security.  If you are feeling you need any assistance in this area, we have resources available to support you today.    Hunger Vital Signs:  Within the past 12 months we worried whether our food would run out before we got money to buy more. Never  Within the past 12 months the food we bought just didn't last and we didn't have money to get more. Never  Ester Hanna LPN,LPN on 8/4/2022 at 4:13 PM      Ester Hanna LPN   "

## 2022-08-16 ENCOUNTER — OFFICE VISIT (OUTPATIENT)
Dept: FAMILY MEDICINE | Facility: OTHER | Age: 62
End: 2022-08-16
Attending: PHYSICIAN ASSISTANT
Payer: COMMERCIAL

## 2022-08-16 VITALS
SYSTOLIC BLOOD PRESSURE: 128 MMHG | OXYGEN SATURATION: 94 % | HEIGHT: 69 IN | TEMPERATURE: 99 F | DIASTOLIC BLOOD PRESSURE: 76 MMHG | BODY MASS INDEX: 33.92 KG/M2 | WEIGHT: 229 LBS | RESPIRATION RATE: 14 BRPM | HEART RATE: 83 BPM

## 2022-08-16 DIAGNOSIS — R22.1 NECK MASS: Primary | ICD-10-CM

## 2022-08-16 PROCEDURE — 99213 OFFICE O/P EST LOW 20 MIN: CPT | Performed by: PHYSICIAN ASSISTANT

## 2022-08-16 ASSESSMENT — PAIN SCALES - GENERAL: PAINLEVEL: NO PAIN (0)

## 2022-08-16 NOTE — PROGRESS NOTES
"  Assessment & Plan     1. Neck mass  Left-sided neck mass that has been present for greater than 1 year, has been growing.  We will pursue CT for additional evaluation.  Will notify with results.  - CT Soft Tissue Neck w Contrast; Future      Return if symptoms worsen or fail to improve.    Sasha Garner PA-C  [facility]    Madeleine Roche is a 61 year old, presenting for the following health issues:  Facial Swelling      History of Present Illness       Reason for visit:  Fractured ribs    He eats 2-3 servings of fruits and vegetables daily.He consumes 0 sweetened beverage(s) daily.He exercises with enough effort to increase his heart rate 9 or less minutes per day.  He exercises with enough effort to increase his heart rate 3 or less days per week.   He is taking medications regularly.     Here for evaluation of a lump in his left neck that has been present since May 2021.  Reports more recently has seemed to become larger.  Is not painful or bothersome.  No associated overlying skin changes.  No fever/chills, weight changes, night sweats, respiratory symptoms, GI symptoms.    PAST MEDICAL HISTORY: History reviewed. No pertinent past medical history.    PAST SURGICAL HISTORY: History reviewed. No pertinent surgical history.    FAMILY HISTORY: History reviewed. No pertinent family history.    SOCIAL HISTORY:   Social History     Tobacco Use     Smoking status: Never Smoker     Smokeless tobacco: Never Used   Substance Use Topics     Alcohol use: No     Alcohol/week: 0.0 standard drinks        Allergies   Allergen Reactions     Codeine Other (See Comments)     Anxiety     Penicillins Anxiety     No current outpatient medications on file.     No current facility-administered medications for this visit.         Review of Systems   Per HPI        Objective    /76   Pulse 83   Temp 99  F (37.2  C)   Resp 14   Ht 1.753 m (5' 9\")   Wt 103.9 kg (229 lb)   SpO2 94%   BMI 33.82 " kg/m    Body mass index is 33.82 kg/m .  Physical Exam   General: Pleasant, in no apparent distress.  Eyes: Sclera are white and conjunctiva are clear bilaterally. Lacrimal apparatus free of erythema, edema, and discharge bilaterally.  Ears: External ears without erythema or edema.   Nose: External nose is symmetrical and free of lesions and deformities.   Neck: Palpable mass to left anterolateral neck below jawline approximately 1 inch x 1.5 inches in diameter.  No associated tenderness palpation, no overlying skin changes, warmth, bleeding or drainage.  Thyroid: Thyroid isthmus is palpable and midline. Lobes are palpable bilaterally but not enlarged.  Skin: No jaundice, pallor, rashes, or lesions.  Psych: Appropriate mood and affect.

## 2022-08-16 NOTE — NURSING NOTE
Patient presents to clinic with lump on left jaw area that he has had since last May 2021.  Radha Hanna, TARIK ....................  8/16/2022   2:42 PM

## 2022-08-23 ENCOUNTER — HOSPITAL ENCOUNTER (OUTPATIENT)
Dept: CT IMAGING | Facility: OTHER | Age: 62
Discharge: HOME | End: 2022-08-23
Attending: PHYSICIAN ASSISTANT | Admitting: PHYSICIAN ASSISTANT
Payer: COMMERCIAL

## 2022-08-23 DIAGNOSIS — R22.1 NECK MASS: ICD-10-CM

## 2022-08-23 PROCEDURE — 250N000011 HC RX IP 250 OP 636: Performed by: PHYSICIAN ASSISTANT

## 2022-08-23 PROCEDURE — 70491 CT SOFT TISSUE NECK W/DYE: CPT

## 2022-08-23 RX ORDER — IOPAMIDOL 755 MG/ML
100 INJECTION, SOLUTION INTRAVASCULAR ONCE
Status: COMPLETED | OUTPATIENT
Start: 2022-08-23 | End: 2022-08-23

## 2022-08-23 RX ADMIN — IOPAMIDOL 100 ML: 755 INJECTION, SOLUTION INTRAVENOUS at 16:00

## 2022-08-26 DIAGNOSIS — R22.1 NECK MASS: Primary | ICD-10-CM

## 2022-08-26 NOTE — PROGRESS NOTES
Order for ENT referral placed based on patient's recent visit and CT neck findings.     Sasha Garner PA-C on 8/26/2022 at 8:23 AM

## 2022-09-13 ENCOUNTER — TRANSFERRED RECORDS (OUTPATIENT)
Dept: HEALTH INFORMATION MANAGEMENT | Facility: CLINIC | Age: 62
End: 2022-09-13

## 2022-09-13 ENCOUNTER — OFFICE VISIT (OUTPATIENT)
Dept: OTOLARYNGOLOGY | Facility: OTHER | Age: 62
End: 2022-09-13
Attending: OTOLARYNGOLOGY
Payer: COMMERCIAL

## 2022-09-13 DIAGNOSIS — C10.9 MALIGNANT NEOPLASM OF OROPHARYNX, UNSPECIFIED (H): Primary | ICD-10-CM

## 2022-09-13 PROCEDURE — G0463 HOSPITAL OUTPT CLINIC VISIT: HCPCS

## 2022-09-14 NOTE — PROGRESS NOTES
document embedded image  Patient Name: Melchor Sadler    Address: [address] [place] [address]     YOB: 1960    [place] [address]    MR Number: [id number]    Phone: [phone]  PCP: Gerda Evans MD            Appointment Date: 09/13/22   Visit Provider: Jamey Houston MD    cc: Gerda Evans MD; Gerda Evans MD~    ENT Progress Note  Intake  Visit Reasons: Neck Mass    HPI  History of Present Illness  Chief complaint:  Left neck mass    History  The patient is a 61-year-old man who comes to the office today for assistance with a left neck mass.  He states he has noted this to be present and slowly enlarging over approximately year.  He has had no throat pain.  He denies dysphagia, odynophagia, hemoptysis, hematemesis, otalgia, voice change.  He is smoker.    Exam  He has a 3-4 cm firm jugulodigastric mass on the left.  I am unable to palpate other adenopathy at this time  Oral cavity oropharynx-he has a small crater like ulcer in his soft palate just above this tonsil on the left.  There are no other mucosal lesions  bimanual exam-there is a palpable firm tumor around the base of the crater of his left soft palate and superior tonsillar pillar.  This measures about a cm and half in diameter.  Nasal-no obstruction or purulence noted  Head neck integument-Clear  Indirect laryngoscopy-clear hypopharynx and larynx  General-patient appears well and in no distress  Neuro-there are no focal cranial nerve deficits    A&P  Assessment & Plan  (1) Oropharyngeal cancer:        Status: Acute        Code(s):  C10.9 - Malignant neoplasm of oropharynx, unspecified  The patient was counseled that he most certainly has a cancer involving his left tonsillar fossa and palate.  This has metastasized to his left neck.  We will make arrangements for panendoscopy and biopsy at his earliest convenience.  This will be followed by a PET scan.  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  He aylin not l likely require any radical surgery.  His questions were answered.  He was obviously disappointed with my impression.  I tried to encourage him that I think he is a high likelihood he will make it through this disease process.      Jamey Houston MD    09/13/22 0733    <Electronically signed by Jamey Houston MD> 09/13/22 7596

## 2022-09-15 ENCOUNTER — OFFICE VISIT (OUTPATIENT)
Dept: FAMILY MEDICINE | Facility: OTHER | Age: 62
End: 2022-09-15
Attending: STUDENT IN AN ORGANIZED HEALTH CARE EDUCATION/TRAINING PROGRAM
Payer: COMMERCIAL

## 2022-09-15 VITALS
WEIGHT: 235 LBS | BODY MASS INDEX: 34.8 KG/M2 | OXYGEN SATURATION: 95 % | HEIGHT: 69 IN | SYSTOLIC BLOOD PRESSURE: 140 MMHG | HEART RATE: 64 BPM | RESPIRATION RATE: 16 BRPM | DIASTOLIC BLOOD PRESSURE: 80 MMHG | TEMPERATURE: 98.4 F

## 2022-09-15 DIAGNOSIS — Z01.818 PREOP GENERAL PHYSICAL EXAM: Primary | ICD-10-CM

## 2022-09-15 LAB
ANION GAP SERPL CALCULATED.3IONS-SCNC: 7 MMOL/L (ref 3–14)
BUN SERPL-MCNC: 17 MG/DL (ref 7–25)
CALCIUM SERPL-MCNC: 9.9 MG/DL (ref 8.6–10.3)
CHLORIDE BLD-SCNC: 103 MMOL/L (ref 98–107)
CO2 SERPL-SCNC: 27 MMOL/L (ref 21–31)
CREAT SERPL-MCNC: 0.86 MG/DL (ref 0.7–1.3)
GFR SERPL CREATININE-BSD FRML MDRD: >90 ML/MIN/1.73M2
GLUCOSE BLD-MCNC: 92 MG/DL (ref 70–105)
HGB BLD-MCNC: 16.5 G/DL (ref 13.3–17.7)
POTASSIUM BLD-SCNC: 4 MMOL/L (ref 3.5–5.1)
SODIUM SERPL-SCNC: 137 MMOL/L (ref 134–144)

## 2022-09-15 PROCEDURE — 99213 OFFICE O/P EST LOW 20 MIN: CPT | Performed by: STUDENT IN AN ORGANIZED HEALTH CARE EDUCATION/TRAINING PROGRAM

## 2022-09-15 PROCEDURE — 36415 COLL VENOUS BLD VENIPUNCTURE: CPT | Mod: ZL | Performed by: STUDENT IN AN ORGANIZED HEALTH CARE EDUCATION/TRAINING PROGRAM

## 2022-09-15 PROCEDURE — 80048 BASIC METABOLIC PNL TOTAL CA: CPT | Mod: ZL | Performed by: STUDENT IN AN ORGANIZED HEALTH CARE EDUCATION/TRAINING PROGRAM

## 2022-09-15 PROCEDURE — 85018 HEMOGLOBIN: CPT | Mod: ZL | Performed by: STUDENT IN AN ORGANIZED HEALTH CARE EDUCATION/TRAINING PROGRAM

## 2022-09-15 ASSESSMENT — PAIN SCALES - GENERAL: PAINLEVEL: NO PAIN (0)

## 2022-09-15 NOTE — PATIENT INSTRUCTIONS
Preparing for Your Surgery  Getting started  A nurse will call you to review your health history and instructions. They will give you an arrival time based on your scheduled surgery time. Please be ready to share:    Your doctor's clinic name and phone number    Your medical, surgical and anesthesia history    A list of allergies and sensitivities    A list of medicines, including herbal treatments and over-the-counter drugs    Whether the patient has a legal guardian (ask how to send us the papers in advance)  Please tell us if you're pregnant--or if there's any chance you might be pregnant. Some surgeries may injure a fetus (unborn baby), so they require a pregnancy test. Surgeries that are safe for a fetus don't always need a test, and you can choose whether to have one.   If you have a child who's having surgery, please ask for a copy of Preparing for Your Child's Surgery.    Preparing for surgery    Within 30 days of surgery: Have a pre-op exam (sometimes called an H&P, or History and Physical). This can be done at a clinic or pre-operative center.  ? If you're having a , you may not need this exam. Talk to your care team.    At your pre-op exam, talk to your care team about all medicines you take. If you need to stop any medicines before surgery, ask when to start taking them again.  ? We do this for your safety. Many medicines can make you bleed too much during surgery. Some change how well surgery (anesthesia) drugs work.    Call your insurance company to let them know you're having surgery. (If you don't have insurance, call [phone].)    Call your clinic if there's any change in your health. This includes signs of a cold or flu (sore throat, runny nose, cough, rash, fever). It also includes a scrape or scratch near the surgery site.    If you have questions on the day of surgery, call your hospital or surgery center.  COVID testing  You may need to be tested for COVID-19 before having  surgery. If so, we will give you instructions.  Eating and drinking guidelines  For your safety: Unless your surgeon tells you otherwise, follow the guidelines below.    Eat and drink as usual until 8 hours before surgery. After that, no food or milk.    Drink clear liquids until 2 hours before surgery. These are liquids you can see through, like water, Gatorade and Propel Water. You may also have black coffee and tea (no cream or milk).    Nothing by mouth within 2 hours of surgery. This includes gum, candy and breath mints.    If you drink alcohol: Stop drinking it the night before surgery.    If your care team tells you to take medicine on the morning of surgery, it's okay to take it with a sip of water.  Preventing infection    Shower or bathe the night before and morning of your surgery. Follow the instructions your clinic gave you. (If no instructions, use regular soap.)    Don't shave or clip hair near your surgery site. We'll remove the hair if needed.    Don't smoke or vape the morning of surgery. You may chew nicotine gum up to 2 hours before surgery. A nicotine patch is okay.  ? Note: Some surgeries require you to completely quit smoking and nicotine. Check with your surgeon.    Your care team will make every effort to keep you safe from infection. We will:  ? Clean our hands often with soap and water (or an alcohol-based hand rub).  ? Clean the skin at your surgery site with a special soap that kills germs.  ? Give you a special gown to keep you warm. (Cold raises the risk of infection.)  ? Wear special hair covers, masks, gowns and gloves during surgery.  ? Give antibiotic medicine, if prescribed. Not all surgeries need antibiotics.  What to bring on the day of surgery    Photo ID and insurance card    Copy of your health care directive, if you have one    Glasses and hearing aides (bring cases)  ? You can't wear contacts during surgery    Inhaler and eye drops, if you use them (tell us about these when  you arrive)    CPAP machine or breathing device, if you use them    A few personal items, if spending the night    If you have . . .  ? A pacemaker, ICD (cardiac defibrillator) or other implant: Bring the ID card.  ? An implanted stimulator: Bring the remote control.  ? A legal guardian: Bring a copy of the certified (court-stamped) guardianship papers.  Please remove any jewelry, including body piercings. Leave jewelry and other valuables at home.  If you're going home the day of surgery    You must have a responsible adult drive you home. They should stay with you overnight as well.    If you don't have someone to stay with you, and you aren't safe to go home alone, we may keep you overnight. Insurance often won't pay for this.  After surgery  If it's hard to control your pain or you need more pain medicine, please call your surgeon's office.  Questions?   If you have any questions for your care team, list them here: _________________________________________________________________________________________________________________________________________________________________________ ____________________________________ ____________________________________ ____________________________________  For informational purposes only. Not to replace the advice of your health care provider. Copyright   2003, 2019 [facility]. All rights reserved. Clinically reviewed by Henny Max MD. [place] [id number] - REV 07/21.

## 2022-09-15 NOTE — NURSING NOTE
Patient presents to clinic for pre op exam.  Surgery with Dr. Houston at [facility] in [place] on 09/22/22 for biopsy left side neck mass.  Medication Rec Complete  Brittany Mascorro LPN............9/15/2022 3:28 PM

## 2022-09-15 NOTE — PROGRESS NOTES
[facility]  [address]  [place] [address]  Phone: [phone]  Fax: [phone]  Primary Provider: No Ref-Primary, Physician  Pre-op Performing Provider: IDA SMILEY      PREOPERATIVE EVALUATION:  Today's date: 9/15/2022    Melchor Sadler is a 61 year old male who presents for a preoperative evaluation.    Surgical Information:  Surgery/Procedure: Biopsy Left side neck mass  Surgery Location: [facility]  Surgeon: Dr. Houston  Surgery Date: 09/22/22  Time of Surgery: TBD  Where patient plans to recover: At home with family  Fax number for surgical facility: [phone]    Type of Anesthesia Anticipated: to be determined    Assessment & Plan     The proposed surgical procedure is considered LOW risk.    Preop general physical exam  Neck mass  No cardiac history. Labs in process   - Basic Metabolic Panel; Future  - Hemoglobin; Future  - Basic Metabolic Panel  - Hemoglobin    Possible Sleep Apnea: uses cpap machine          Risks and Recommendations:  The patient has the following additional risks and recommendations for perioperative complications:   - No identified additional risk factors other than previously addressed    Medication Instructions:  Patient is on no chronic medications    RECOMMENDATION:  APPROVAL GIVEN to proceed with proposed procedure, without further diagnostic evaluation.        Subjective     HPI related to upcoming procedure: Biopsy Neck Mass Left side      Preop Questions 9/15/2022   1. Have you ever had a heart attack or stroke? No   2. Have you ever had surgery on your heart or blood vessels, such as a stent placement, a coronary artery bypass, or surgery on an artery in your head, neck, heart, or legs? No   3. Do you have chest pain with activity? No   4. Do you have a history of  heart failure? No   5. Do you currently have a cold, bronchitis or symptoms of other infection? No   6. Do you have a cough, shortness of  breath, or wheezing? No   7. Do you or anyone in your family have previous history of blood clots? No   8. Do you or does anyone in your family have a serious bleeding problem such as prolonged bleeding following surgeries or cuts? No   9. Have you ever had problems with anemia or been told to take iron pills? No   10. Have you had any abnormal blood loss such as black, tarry or bloody stools? No   11. Have you ever had a blood transfusion? No   12. Are you willing to have a blood transfusion if it is medically needed before, during, or after your surgery? Yes   13. Have you or any of your relatives ever had problems with anesthesia? No   14. Do you have sleep apnea, excessive snoring or daytime drowsiness? YES - has cpap machine    14a. Do you have a CPAP machine? Yes   15. Do you have any artifical heart valves or other implanted medical devices like a pacemaker, defibrillator, or continuous glucose monitor? No   16. Do you have artificial joints? No   17. Are you allergic to latex? No     Health Care Directive:  Patient does not have a Health Care Directive or Living Will: Discussed advance care planning with patient; information given to patient to review.    Preoperative Review of :   reviewed - no record of controlled substances prescribed.      Status of Chronic Conditions:    SLEEP PROBLEM - Patient has a longstanding history of sleep apnea.. Patient has tried OTC medications with limited success.       Review of Systems  CONSTITUTIONAL: NEGATIVE for fever, chills, change in weight  INTEGUMENTARY/SKIN: NEGATIVE for worrisome rashes, moles or lesions  EYES: NEGATIVE for vision changes or irritation  ENT/MOUTH: NEGATIVE for ear, mouth and throat problems  RESP: NEGATIVE for significant cough or SOB  CV: NEGATIVE for chest pain, palpitations or peripheral edema  GI: NEGATIVE for nausea, abdominal pain, heartburn, or change in bowel habits  : NEGATIVE for frequency, dysuria, or  "hematuria  MUSCULOSKELETAL: NEGATIVE for significant arthralgias or myalgia  NEURO: NEGATIVE for weakness, dizziness or paresthesias  ENDOCRINE: NEGATIVE for temperature intolerance, skin/hair changes  HEME: NEGATIVE for bleeding problems  PSYCHIATRIC: NEGATIVE for changes in mood or affect    Patient Active Problem List    Diagnosis Date Noted     Family history of diverticulitis of colon 12/14/2016     Priority: Medium     Insomnia 03/05/2015     Priority: Medium      History reviewed. No pertinent past medical history.  History reviewed. No pertinent surgical history.  No current outpatient medications on file.       Allergies   Allergen Reactions     Codeine Other (See Comments)     Anxiety     Penicillins Anxiety        Social History     Tobacco Use     Smoking status: Never Smoker     Smokeless tobacco: Never Used   Substance Use Topics     Alcohol use: No     Alcohol/week: 0.0 standard drinks     History reviewed. No pertinent family history.  History   Drug Use No     Comment: Drug use: No         Objective     BP (!) 140/80   Pulse 64   Temp 98.4  F (36.9  C) (Tympanic)   Resp 16   Ht 1.753 m (5' 9\")   Wt 106.6 kg (235 lb)   SpO2 95%   BMI 34.70 kg/m      Physical Exam    GENERAL APPEARANCE: healthy, alert and no distress     EYES: EOMI,  PERRL     HENT: ear canals and TM's normal and nose and mouth without ulcers or lesions     NECK: left neck near jaw line mass     RESP: lungs clear to auscultation - no rales, rhonchi or wheezes     CV: regular rates and rhythm, normal S1 S2, no S3 or S4 and no murmur, click or rub     MS: extremities normal- no gross deformities noted, no evidence of inflammation in joints, FROM in all extremities.     SKIN: no suspicious lesions or rashes     NEURO: Normal strength and tone, sensory exam grossly normal, mentation intact and speech normal     PSYCH: mentation appears normal. and affect normal/bright      Diagnostics:  No results found for this or any previous " visit (from the past 24 hour(s)).   No EKG required, no history of coronary heart disease, significant arrhythmia, peripheral arterial disease or other structural heart disease.    Revised Cardiac Risk Index (RCRI):  The patient has the following serious cardiovascular risks for perioperative complications:   - No serious cardiac risks = 0 points     RCRI Interpretation: 0 points: Class I (very low risk - 0.4% complication rate)           Signed Electronically by: Maryam Torres MD  Copy of this evaluation report is provided to requesting physician.

## 2022-09-17 ENCOUNTER — HEALTH MAINTENANCE LETTER (OUTPATIENT)
Age: 62
End: 2022-09-17

## 2022-09-22 ENCOUNTER — TRANSFERRED RECORDS (OUTPATIENT)
Dept: HEALTH INFORMATION MANAGEMENT | Facility: OTHER | Age: 62
End: 2022-09-22

## 2022-10-04 ENCOUNTER — TRANSFERRED RECORDS (OUTPATIENT)
Dept: HEALTH INFORMATION MANAGEMENT | Facility: CLINIC | Age: 62
End: 2022-10-04

## 2022-10-04 ENCOUNTER — OFFICE VISIT (OUTPATIENT)
Dept: OTOLARYNGOLOGY | Facility: OTHER | Age: 62
End: 2022-10-04
Attending: OTOLARYNGOLOGY
Payer: COMMERCIAL

## 2022-10-04 DIAGNOSIS — C09.9 TONSIL CANCER (H): Primary | ICD-10-CM

## 2022-10-04 PROCEDURE — G0463 HOSPITAL OUTPT CLINIC VISIT: HCPCS

## 2022-10-10 ENCOUNTER — PATIENT OUTREACH (OUTPATIENT)
Dept: ONCOLOGY | Facility: OTHER | Age: 62
End: 2022-10-10

## 2022-10-10 ENCOUNTER — MEDICAL CORRESPONDENCE (OUTPATIENT)
Dept: PET IMAGING | Facility: HOSPITAL | Age: 62
End: 2022-10-10

## 2022-10-10 NOTE — PROGRESS NOTES
[facility]: Cancer Care                                                                                          TC to patient to inquire if he would like to see medical oncology in [place] or [place]. He would prefer to come to [place]. He is waiting on PET scan to be scheduled and is willing to wait until after the PET scan to be scheduled. Will watch for this to be scheduled and will schedule next available consult appointment to schedule. Patient will call as well when he finds out.     Signature:  Isabelle Barnes RN

## 2022-10-12 ENCOUNTER — TELEPHONE (OUTPATIENT)
Dept: RADIATION ONCOLOGY | Facility: HOSPITAL | Age: 62
End: 2022-10-12

## 2022-10-18 NOTE — PROGRESS NOTES
document embedded image  Patient Name: Melchor Sadler    Address: [address] [place] [address]     YOB: 1960    [place] [address]    MR Number: [id number]    Phone: [phone]  PCP: Gerda Evans MD            Appointment Date: 10/04/22   Visit Provider: Jamey Houston MD    cc: Gerda Evans MD; ~    ENT Progress Note  Intake  Visit Reasons: PO Biopsy of left palate    HPI  History of Present Illness  Chief complaint:  Postop check    History  The patient is a 61-year-old male who is here today for postop check following panendoscopy and biopsy.  His panendoscopy failed to reveal evidence of other tumor.  His biopsies did confirm his primary tumor in his left tonsil.  His staging at this point would be T1 N2 B M0.  He is had CTs of his neck and chest.  He is not had a PET scan to date.    Exam  Unchanged  He is healing well at his biopsy site    Allergies    codeine Adverse Reaction (Verified 09/22/22 12:35)  Anxiety  Penicillins Adverse Reaction (Verified 09/22/22 12:35)  Unknown    PFSH  PFSH:     Social History: (Reviewed 09/22/22 @ 14:45 by Jeramie Weaver MD)  Smoking Status:  Never smoker       A&P  Assessment & Plan  (1) Tonsil cancer:        Status: Acute        Code(s):  C09.9 - Malignant neoplasm of tonsil, unspecified  We had an in-depth discussion today about the implications of his tumor and treatment options.  We discussed sending him to [facility] for deescalation therapy but he was warned that this would include likely transoral robotic surgery, neck dissection, followed by chemotherapy and radiation therapy at reduced dose.  I likewise discuss the possibility of harm's seeking a 2nd opinion at the [facility].  I would recommend combined chemotherapy and radiation therapy I would expect a very high chance of cure given his HPV status.  I would hold back on surgery and use it only for salvage.  He will think about his options.  Leaning towards  being treated her local with Dr Griffin of Hematology Oncology and radiation therapy at [place].  He will call me with his final decision over the next week and I will make appropriate referrals.  He is understand that he will be following with me for a 5 year time span following completion of therapy.      Jamey Houston MD    10/03/22 1611    <Electronically signed by Jamey Houston MD> 10/10/22 1301

## 2022-10-25 ENCOUNTER — TELEPHONE (OUTPATIENT)
Dept: RADIATION ONCOLOGY | Facility: HOSPITAL | Age: 62
End: 2022-10-25

## 2022-10-26 ENCOUNTER — ONCOLOGY VISIT (OUTPATIENT)
Dept: RADIATION ONCOLOGY | Facility: HOSPITAL | Age: 62
End: 2022-10-26
Attending: STUDENT IN AN ORGANIZED HEALTH CARE EDUCATION/TRAINING PROGRAM
Payer: COMMERCIAL

## 2022-10-26 VITALS
SYSTOLIC BLOOD PRESSURE: 140 MMHG | TEMPERATURE: 98 F | HEART RATE: 59 BPM | RESPIRATION RATE: 18 BRPM | OXYGEN SATURATION: 95 % | DIASTOLIC BLOOD PRESSURE: 78 MMHG

## 2022-10-26 DIAGNOSIS — C09.9 MALIGNANT NEOPLASM OF TONSIL (H): Primary | ICD-10-CM

## 2022-10-26 PROCEDURE — 99205 OFFICE O/P NEW HI 60 MIN: CPT | Performed by: RADIOLOGY

## 2022-10-26 PROCEDURE — G0463 HOSPITAL OUTPT CLINIC VISIT: HCPCS

## 2022-10-26 ASSESSMENT — ENCOUNTER SYMPTOMS
DIZZINESS: 0
INSOMNIA: 0
SORE THROAT: 0
HEADACHES: 0
WEIGHT LOSS: 0
FEVER: 0
ABDOMINAL PAIN: 0
CHILLS: 0
SHORTNESS OF BREATH: 0

## 2022-10-26 ASSESSMENT — PAIN SCALES - GENERAL: PAINLEVEL: NO PAIN (0)

## 2022-10-26 ASSESSMENT — PATIENT HEALTH QUESTIONNAIRE - PHQ9: SUM OF ALL RESPONSES TO PHQ QUESTIONS 1-9: 0

## 2022-10-26 NOTE — PROGRESS NOTES
Patient was assessed using the NCCN psychosocial distress thermometer. Patient rated the score as 0/10. Patient denies current stressors to be brought to the attention of provider or Oncology RN Care Coordinator.

## 2022-10-26 NOTE — PROGRESS NOTES
[facility]    RADIATION ONCOLOGY CONSULTATION      Melchor Sadler  is referred by No ref. provider found for an oncology consultation.    PRIMARY PHYSICIAN: No Ref-Primary, Physician     Cancer Staging   No matching staging information was found for the patient.    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 Thus far his stage is cT1N1. Completion of his staging work-up with a PET/CT is pending.    We discussed at length with the patient and his wife the option of concurrent chemoradiation.  We reviewed the indications, logistics, risks, benefits and side effects (both short and long-term) of a course of combined modality therapy, presuming his PET does not reveal any distant metastatic disease.  The side effects/risks including, but not limited to, fatigue, skin changes including hair loss in the treated area, mucositis, dry mouth, taste changes, dysphagia, thickened saliva, taste changes and neck fibrosis were explained to them.  I also discussed with him the possibility of ipsilateral radiotherapy only based on what looks to be a well lateralized primary and ipsilateral neck nodes.  He understands that this will have to be finalized after his PET/CT. I have personally reviewed his CT of the neck from 8/23/2022.    After full discussion, the patient verbalized understanding and acceptance of the above.      PLAN: We will await the results of his PET/CT as well as his discussion with medical oncology.  We can then move forward with CT simulation for radiotherapy planning and subsequent initiation of treatment thereafter. His radiotherapy plan would be 70 Gy delivered in 35 fractions of 2 Gy each. Ipsilateral vs bilateral neck coverage is TBD by results of PET CT.   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     8/23/2022 CT soft tissue neck with contrast: Left jugulodigastric chain has a 3.8 cm everardo mass with a adjacent 2.3 cm enlarged node.  At the roof of the left tonsillar pillar there is a question mucosal mass    9/22/2022 left tonsil panendoscopy and biopsy: Reveals poorly differentiated squamous cell carcinoma, p16 positive.    10/04/2022 ENT: Recommends chemo/rads     11/02/2022 PET eyes to thighs: pending for 11/2/2022 11/03/2022 Medical oncology visit: pending for 11/3/2022    Today in the clinic he reports feeling fair overall. He denies dysphagia, odynophagia, sore throat or ear pain.     Port: No  Pacemaker: No  Hx of autoimmune disorders: No  Previous radiation therapy: No    Past Medical History:   Diagnosis Date     Malignant neoplasm of tonsil (H) 10/04/2022    Per [facility] Chart     Oropharyngeal cancer (H) 09/13/2022    Per [facility] Chart     GLENDA (obstructive sleep apnea) 09/22/2022    Per [facility] chart       Past Surgical History:   Procedure Laterality Date     Panendoscopy, biopsy left palate  09/22/2022    [facility]-Dr. Jamey Houston       Family History   Problem Relation Age of Onset     Lung Cancer Mother      Colon Cancer Maternal Grandmother        Social History     Tobacco Use     Smoking status: Never     Smokeless tobacco: Former     Types: Chew     Quit date: 1995   Substance Use Topics     Alcohol use: Not Currently         CURRENT MEDICATIONS:   No current outpatient medications on file.     No current facility-administered medications for this visit.         ALLERGIES:  Allergies   Allergen Reactions     Codeine Other (See Comments)     Anxiety     Penicillins Anxiety     Review of Systems   Constitutional: Negative for chills, fever, malaise/fatigue and weight loss.   HENT:  Negative for congestion, ear pain and sore throat.    Respiratory: Negative for shortness of breath.    Cardiovascular: Negative for chest pain.   Gastrointestinal: Negative for abdominal pain.   Neurological: Negative for dizziness and headaches.   Psychiatric/Behavioral: The patient does not have insomnia.      VITAL SIGNS:  BP (!) 140/78 (BP Location: Left arm, Patient Position: Sitting, Cuff Size: Adult Regular)   Pulse 59   Temp 98  F (36.7  C) (Tympanic)   Resp 18   SpO2 95%   Wt Readings from Last 4 Encounters:   09/15/22 106.6 kg (235 lb)   08/16/22 103.9 kg (229 lb)   08/04/22 102.6 kg (226 lb 4.8 oz)   07/22/22 99.8 kg (220 lb)       PHYSICAL EXAM:  Constitutional: awake, alert, cooperative, no apparent distress, and appears stated age  Eyes: Lids and lashes normal, sclera clear, conjunctiva normal  ENT: Normocephalic. Left exophytic but flat surfaced tonsillar mass appears to arise from/extend just anteriorly to the left ATP, posteriorly to the  PTP. The mass appears well lateralized and does not otherwise extend beyond the tonsillar fossa. Palpation confirms these findings. Left lateral firm neck mass.   Hematologic / Lymphatic: cervical lymphadenopathy noted - left level II/III fullness favoring a lymph node/everardo conglomerate. This is somewhat mobile and measures approximately 4 cm in max dimension.   Respiratory: No increased work of breathing, good air exchange, clear to auscultation bilaterally, no crackles or wheezing  Cardiovascular: Normal apical impulse, regular rate and rhythm, normal S1 and S2, no S3 or S4, and no murmur noted  GI: Normal bowel sounds, firm, non-distended, non-tender  Skin: No redness, warmth, or swelling otherwise.  Musculoskeletal: There is no redness, warmth, or swelling of the joints.  Full range of motion noted. Tone is normal.  Neurologic: Awake, alert, oriented to name, place and time. Gait is normal.  Neuropsychiatric: General: normal, calm and normal eye  contact      СЕРГЕЙ Méndez CNP    Time spent in interaction with the patient, review of medical records, documentation, care  coordination, review of imaging and physical examination: 70 minutes    Clive Restrepo MD  Radiation Oncologist    I saw this patient while providing locum tenens coverage.

## 2022-11-01 ENCOUNTER — TELEPHONE (OUTPATIENT)
Dept: PET IMAGING | Facility: HOSPITAL | Age: 62
End: 2022-11-01

## 2022-11-02 ENCOUNTER — HOSPITAL ENCOUNTER (OUTPATIENT)
Dept: PET IMAGING | Facility: HOSPITAL | Age: 62
Discharge: HOME | End: 2022-11-02
Attending: OTOLARYNGOLOGY | Admitting: OTOLARYNGOLOGY
Payer: COMMERCIAL

## 2022-11-02 DIAGNOSIS — C10.9 MALIGNANT NEOPLASM OF OROPHARYNX (H): ICD-10-CM

## 2022-11-02 DIAGNOSIS — C09.9 MALIGNANT NEOPLASM OF TONSIL (H): ICD-10-CM

## 2022-11-02 PROCEDURE — 343N000001 HC RX 343: Performed by: OTOLARYNGOLOGY

## 2022-11-02 PROCEDURE — 78815 PET IMAGE W/CT SKULL-THIGH: CPT | Mod: PI

## 2022-11-02 PROCEDURE — A9552 F18 FDG: HCPCS | Performed by: OTOLARYNGOLOGY

## 2022-11-02 RX ADMIN — FLUDEOXYGLUCOSE F-18 12.66 MCI.: 500 INJECTION, SOLUTION INTRAVENOUS at 10:43

## 2022-11-03 ENCOUNTER — TELEPHONE (OUTPATIENT)
Dept: ONCOLOGY | Facility: OTHER | Age: 62
End: 2022-11-03

## 2022-11-03 ENCOUNTER — ONCOLOGY VISIT (OUTPATIENT)
Dept: ONCOLOGY | Facility: OTHER | Age: 62
End: 2022-11-03
Attending: INTERNAL MEDICINE
Payer: COMMERCIAL

## 2022-11-03 VITALS
SYSTOLIC BLOOD PRESSURE: 152 MMHG | OXYGEN SATURATION: 95 % | TEMPERATURE: 97.8 F | BODY MASS INDEX: 35.82 KG/M2 | WEIGHT: 241.84 LBS | HEIGHT: 69 IN | HEART RATE: 64 BPM | RESPIRATION RATE: 19 BRPM | DIASTOLIC BLOOD PRESSURE: 84 MMHG

## 2022-11-03 DIAGNOSIS — C09.9 MALIGNANT NEOPLASM OF TONSIL (H): Primary | ICD-10-CM

## 2022-11-03 PROCEDURE — 99205 OFFICE O/P NEW HI 60 MIN: CPT | Performed by: INTERNAL MEDICINE

## 2022-11-03 RX ORDER — HEPARIN SODIUM,PORCINE 10 UNIT/ML
5 VIAL (ML) INTRAVENOUS
Status: CANCELLED | OUTPATIENT
Start: 2022-11-29

## 2022-11-03 RX ORDER — PALONOSETRON 0.05 MG/ML
0.25 INJECTION, SOLUTION INTRAVENOUS ONCE
Status: CANCELLED | OUTPATIENT
Start: 2022-11-29

## 2022-11-03 RX ORDER — HEPARIN SODIUM (PORCINE) LOCK FLUSH IV SOLN 100 UNIT/ML 100 UNIT/ML
5 SOLUTION INTRAVENOUS
Status: CANCELLED | OUTPATIENT
Start: 2022-11-29

## 2022-11-03 RX ORDER — ALBUTEROL SULFATE 90 UG/1
1-2 AEROSOL, METERED RESPIRATORY (INHALATION)
Status: CANCELLED
Start: 2022-11-29

## 2022-11-03 RX ORDER — ALBUTEROL SULFATE 0.83 MG/ML
2.5 SOLUTION RESPIRATORY (INHALATION)
Status: CANCELLED | OUTPATIENT
Start: 2022-11-29

## 2022-11-03 RX ORDER — MEPERIDINE HYDROCHLORIDE 25 MG/ML
25 INJECTION INTRAMUSCULAR; INTRAVENOUS; SUBCUTANEOUS EVERY 30 MIN PRN
Status: CANCELLED | OUTPATIENT
Start: 2022-11-29

## 2022-11-03 RX ORDER — METHYLPREDNISOLONE SODIUM SUCCINATE 125 MG/2ML
125 INJECTION, POWDER, LYOPHILIZED, FOR SOLUTION INTRAMUSCULAR; INTRAVENOUS
Status: CANCELLED
Start: 2022-11-29

## 2022-11-03 RX ORDER — LORAZEPAM 2 MG/ML
0.5 INJECTION INTRAMUSCULAR EVERY 4 HOURS PRN
Status: CANCELLED | OUTPATIENT
Start: 2022-11-29

## 2022-11-03 RX ORDER — EPINEPHRINE 1 MG/ML
0.3 INJECTION, SOLUTION, CONCENTRATE INTRAVENOUS EVERY 5 MIN PRN
Status: CANCELLED | OUTPATIENT
Start: 2022-11-29

## 2022-11-03 RX ORDER — DIPHENHYDRAMINE HYDROCHLORIDE 50 MG/ML
50 INJECTION INTRAMUSCULAR; INTRAVENOUS
Status: CANCELLED
Start: 2022-11-29

## 2022-11-03 ASSESSMENT — PAIN SCALES - GENERAL: PAINLEVEL: NO PAIN (0)

## 2022-11-03 NOTE — NURSING NOTE
"Oncology Rooming Note    November 3, 2022 1:03 PM   Melchor Sadler is a 62 year old male who presents for:    Chief Complaint   Patient presents with     Oncology Clinic Visit     New consult.Malignant neoplasm of tonsil      Initial Vitals: BP (!) 152/84   Pulse 64   Temp 97.8  F (36.6  C) (Tympanic)   Resp 19   Ht 1.753 m (5' 9\")   Wt 109.7 kg (241 lb 13.5 oz)   SpO2 95%   BMI 35.71 kg/m   Estimated body mass index is 35.71 kg/m  as calculated from the following:    Height as of this encounter: 1.753 m (5' 9\").    Weight as of this encounter: 109.7 kg (241 lb 13.5 oz). Body surface area is 2.31 meters squared.  No Pain (0) Comment: Data Unavailable   No LMP for male patient.  Allergies reviewed: Yes  Medications reviewed: Yes    Medications: Medication refills not needed today.  Pharmacy name entered into Fision: [facility] [address]              "

## 2022-11-03 NOTE — TELEPHONE ENCOUNTER
Called pt LVM advised to call back as he is scheduled for chemo ed need to advise of apt date and time

## 2022-11-03 NOTE — Clinical Note
Hi I forgot to ask you guys. Do you do a pretreatment audiometry here and also refer to speech and dietician before treatment for head and neck.

## 2022-11-03 NOTE — Clinical Note
Hi,  RT was waiting for pet which did not show any distant mets. Therefore plan for chemoRT. Can you please let them know as they are waiting for PET to sim him  Order for port placed and cisplatin. He will need chemoteaching.

## 2022-11-03 NOTE — PROGRESS NOTES
MEDICAL ONCOLOGY CONSULT NOTE  Nov 3, 2022    Reason for referral: Head and neck cancer.     Referring Provider: Dr. Houston    HISTORY OF PRESENT ILLNESS  Melchor Sadler is a 62 year old male with PMH as stated below who is seen in the oncology clinic for consultation of his head and neck cancer.     His history in short is as follows:    Mr. Sadler initially felt a swelling in his neck 1 year ago. It was monitored. He then underwent a CT scan when he was found to have laceration of the scalp.    8/23/2022: CT soft tissue neck mass: 3.8 cm probable everardo mass in the left jugulodigastric chain. Adjacent also enlarged 2.3 cm long axis node. Differential considerations favor  metastatic disease. A mucosal mass is questioned at the roof of the left tonsillar pillar.    He was evaluated by Dr. Houston at 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     Recommended PET scan and referral to radiation oncology and medical oncology for discussion regarding concurrent chemoRT.     10/26/2022: Radiation oncology consultation with recommendations for concurrent chemoRT.     11/2/2022: PET scan: IMPRESSION: Large left anterior cervical chain lymph node metastasismeasuring 4.0 x 3.5 cm in dimension. There are a few mildly enlarged  adjacent left anterior cervical chain lymph nodes with uptake not significantly greater than that of background uptake. No evidence of  distant metastatic disease otherwise.    Mr. Sadler is doing well. Denies any dysphagia, odynophagia. The left neck mass has increased slightly. Denies any pain. Denies any weight or appetite loss.     REVIEW OF SYSTEMS  A 12-point ROS negative except as in HPI      No current outpatient medications on file.       Allergies   Allergen Reactions     Codeine Other (See Comments)     Anxiety     Penicillins Anxiety     Immunization History   Administered  "Date(s) Administered     MMR 09/11/2009, 09/11/2009     Tdap (Adacel,Boostrix) 01/30/2013       Past Medical History:   Diagnosis Date     Malignant neoplasm of tonsil (H) 10/04/2022    Per [facility] Chart     Oropharyngeal cancer (H) 09/13/2022    Per [facility] Chart     GLENDA (obstructive sleep apnea) 09/22/2022    Per [facility] chart       Past Surgical History:   Procedure Laterality Date     Panendoscopy, biopsy left palate  09/22/2022    [facility]-Dr. Jamey Houston       SOCIAL HISTORY  History   Smoking Status     Never   Smokeless Tobacco     Former     Types: Chew     Quit date: 1995    Social History    Substance and Sexual Activity      Alcohol use: Not Currently     History   Drug Use No     Comment: Drug use: No       FAMILY HISTORY  Family History   Problem Relation Age of Onset     Lung Cancer Mother      Cancer Mother      Diabetes Father      Colon Cancer Maternal Grandmother      Hyperlipidemia No family hx of      Coronary Artery Disease No family hx of      Hypertension No family hx of      Breast Cancer No family hx of      Prostate Cancer No family hx of      Depression No family hx of      Anxiety Disorder No family hx of      Mental Illness No family hx of      Anesthesia Reaction No family hx of      Asthma No family hx of        PHYSICAL EXAMINATION  BP (!) 152/84   Pulse 64   Temp 97.8  F (36.6  C) (Tympanic)   Resp 19   Ht 1.753 m (5' 9\")   Wt 109.7 kg (241 lb 13.5 oz)   SpO2 95%   BMI 35.71 kg/m    Wt Readings from Last 2 Encounters:   11/03/22 109.7 kg (241 lb 13.5 oz)   09/15/22 106.6 kg (235 lb)     Physical Exam  Constitutional:       Appearance: He is obese.   HENT:      Mouth/Throat:      Mouth: Mucous membranes are moist.   Eyes:      Conjunctiva/sclera: Conjunctivae normal.   Lymphadenopathy:      Cervical: Cervical adenopathy present.      Left cervical: Superficial cervical adenopathy present.      Comments: 3 cm mass present under chin over superior left neck.  "   Neurological:      Mental Status: He is alert.     LABORATORY AND IMAGING     Latest Reference Range & Units 09/15/22 16:04   Sodium 134 - 144 mmol/L 137   Potassium 3.5 - 5.1 mmol/L 4.0   Chloride 98 - 107 mmol/L 103   Carbon Dioxide 21 - 31 mmol/L 27   Urea Nitrogen 7 - 25 mg/dL 17   Creatinine 0.70 - 1.30 mg/dL 0.86       ASSESSMENT AND PLAN    1. 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     Referred by ENT for discussion regarding concurrent chemoRT. Has met with radiation oncology. PET scan did not show any distant disease.     Discussed with Mr. Sadler and his wife regarding addition of chemotherapy with concurrent RT. I would recommend weekly cisplatin 40 mg weekly for the course of RT.    Expected side effects which include but not limited to nasuea, vomiting, diarrhea, low blood counts, infection, kidney or liver toxicity, allergic reactions, neuropathy, hearing loss.     He is agreeable to proceed.     I also discussed the possible need for feeding tube down the line if the loses a significant amount of weight. Discussed need for port. Referral placed.     All questions answered.     We will see him in follow up once he is ready to start treatment.     Total time spent on the patient was 60 minutes which included time spent reviewing lab results, performing history and physical, reviewing imaging results with the patient, documenting history and physical and ordering treatment.     Ching Pruitt MD

## 2022-11-04 NOTE — PROGRESS NOTES
Oncology Treatment Initiation/Therapy Education Visit:    Reason for Visit:  Melchor is a 62 year old gentleman with a new diagnosis of tonsillar cancer who presents today for chemotherapy education.     Nursing Note and documentation reviewed: Yes    HPI:    Melchor reports that he is doing well. Notes that physically, he has been doing fine since his diagnosis. 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 No recent signs of infection. No fever, chills, chest pain, cough, or SOB. No bleeding concerns. Denies nausea or vomiting. Appetite has been good, actually notes a a weight gain since his diagnosis. Notes he hasn't had difficulty swallowing. No limitations on what he has been able to eat, although trying to eat healthy. Notes he is a good water drinker. Bowels are regular. Energy levels have been good.     Oncologic History:   05/2021 First noticed left neck mass  08/16/2022 Seen by PCP, with complaints of left growing neck mass. CT ordered.   08/23/2022 CT soft tissue neck mass: 3.8 cm probable everardo mass in the left jugulodigastric chain. Adjacent also enlarged 2.3 cm long axis node. Differential considerations favor  metastatic disease. A mucosal mass is questioned at the roof of the left tonsillar pillar.  09/13/2022 Evaluated by Dr. Houston at [facility] who recommended perry endoscopy with biopsy of the left tonsil  09/22/2022 Underwent Panendoscopy with path showing poorly differentiated squamous carcinoma, p16 positive.   10/04/2022 Seen again by Dr. Houston who recommended PET scan and referral to radiation oncology and medical oncology for discussion regarding concurrent chemoRT.   10/26/2022 Met in consultation with Dr. Restrepo of [facility], who at the time, states his radiotherapy plan would be 70 Gy delivered in 35 fractions of 2 Gy each. Ipsilateral vs bilateral neck coverage is TBD by results of PET CT.   11/2/2022 PET scan IMPRESSION: Large left anterior cervical  chain lymph node metastasis measuring 4.0 x 3.5 cm in dimension. There are a few mildly enlarged adjacent left anterior cervical chain lymph nodes with uptake not significantly greater than that of background uptake. No evidence of distant metastatic disease otherwise.  11/03/2022 Met in consultation with Dr. Pruitt of [facility] who agreed with the the plan of concurrent chemorads, utilizing Cisplatin 40 mg weekly.   11/11/2022 Planned port placement  11/16/2022 Planned Rad Onc simulation    Treatment Goal: Curative    Planned [place]: Weekly Cisplatin 40 mg/m2 given concurrently with radiation therapy  Current Cycle: NA  Completed cycles: NA      Previous treatment:  N/A    Past Medical History:   Diagnosis Date     Malignant neoplasm of tonsil (H) 10/04/2022    Per [facility] Chart     Oropharyngeal cancer (H) 09/13/2022    Per [facility] Chart     GLENDA (obstructive sleep apnea) 09/22/2022    Per [facility] chart       Past Surgical History:   Procedure Laterality Date     Panendoscopy, biopsy left palate  09/22/2022    [facility]-Dr. Jamey Houston       Family History   Problem Relation Age of Onset     Lung Cancer Mother      Cancer Mother      Diabetes Father      Colon Cancer Maternal Grandmother      Hyperlipidemia No family hx of      Coronary Artery Disease No family hx of      Hypertension No family hx of      Breast Cancer No family hx of      Prostate Cancer No family hx of      Depression No family hx of      Anxiety Disorder No family hx of      Mental Illness No family hx of      Anesthesia Reaction No family hx of      Asthma No family hx of        Social History     Socioeconomic History     Marital status:      Spouse name: Sandra     Number of children: 2     Years of education: 16     Highest education level: Not on file   Occupational History     Employer: [employer]     Occupation: ASV   Tobacco Use     Smoking status: Never     Smokeless tobacco: Former     Types: Chew     Quit date: 1995   Vaping Use  "    Vaping Use: Never used   Substance and Sexual Activity     Alcohol use: Not Currently     Drug use: No     Comment: Drug use: No     Sexual activity: Yes     Partners: Female   Other Topics Concern     Parent/sibling w/ CABG, MI or angioplasty before 65F 55M? Not Asked   Social History Narrative     Not on file     Social Determinants of Health     Financial Resource Strain: Not on file   Food Insecurity: Not on file   Transportation Needs: Not on file   Physical Activity: Not on file   Stress: Not on file   Social Connections: Not on file   Intimate Partner Violence: Not on file   Housing Stability: Not on file       No current outpatient medications on file.     No current facility-administered medications for this visit.        Allergies   Allergen Reactions     Codeine Other (See Comments)     Anxiety     Penicillins Anxiety       Review Of Systems:  A complete review of systems is negative except for the above mentioned items in the interval history.     ECOG Performance Status: 0    Physical Exam:  /78   Pulse 68   Temp 97.8  F (36.6  C) (Tympanic)   Resp 18   Ht 1.753 m (5' 9\")   Wt 110.2 kg (242 lb 15.2 oz)   SpO2 94%   BMI 35.88 kg/m    GENERAL APPEARANCE: Healthy, alert and in no acute distress.  NEURO: Alert and oriented x 3.  Gait steady.  PSYCHIATRIC: Mentation and affect appear normal.  Mood appropriate.    Laboratory:  No results found for any visits on 11/07/22.    Imaging Studies:    PET ONCOLOGY (EYES TO THIGHS)     HISTORY: 62 yearsMale Malignant neoplasm of oropharynx (H); Malignant  neoplasm of tonsil (H)     TECHNIQUE: 12.66 millicuries FDG was injected intravenously. A  combined PET/CT scan was performed. CT imaging was performed without  contrast for localization purposes only.     COMPARISON: CT abdomen and pelvis 12/15/2016     FINDINGS:      Neck: There is a hypermetabolic left anterior cervical chain lymph  node measuring 4.0 cm AP, 3.5 cm transversely with peak SUV of " 18.6.  This is anteromedial to the sternocleidomastoid and just below the  level of the angle of the mandible.     Smaller lymph nodes adjacent to this within the left anterior chain  are present measuring up to 8 mm in short axis. These are  asymmetrically larger when compared to right-sided anterior cervical  chain lymph nodes. Uptake within these smaller left anterior chain  lymph nodes have peak SUV values of up to 3.0. This is not  significantly higher than that of background metabolic uptake.     There is no evidence of a pharyngeal or parapharyngeal mass otherwise.     CHEST:No suspicious hypermetabolism is present. There are nonacute  nondisplaced posterior lower left rib fractures.     Abdomen/pelvis:No suspicious hypermetabolism is present.                                                                         IMPRESSION: Large left anterior cervical chain lymph node metastasis  measuring 4.0 x 3.5 cm in dimension. There are a few mildly enlarged  adjacent left anterior cervical chain lymph nodes with uptake not  significantly greater than that of background uptake. No evidence of  distant metastatic disease otherwise.     SHARONDA DANGELO MD       Discussion:    Nutrition: Discussed, no issues currently but amenable to nutrition referral.   Cancer Rehab: Discussed and Declined. Has remained phyiscally active.      **Any Physical Limitations to exercise?  unknown  Palliative Care Program:  NA  Health Care Directive: Has completed at home, asked that he bring us a copy for our records.  Pharmacy consult review:  Not yet completed  Importance of Central line care (port) or IV site care.  Discussed and shown port. Scheduled for procedure this Friday.   Vaccines/Evusheld: Unvaccinated. Discussed given his liklihood of immunosuppression. Patient declined.   Speech Referral:Disccused, patient declined.   Audiology referral: Discussed, states he just had hearing test with his employer so declined referral.  "    Duration: x Cycles and rationale for strict adherence, specific supportive medication Compazine, Zofran and Dexamethasone and side effects (including long-term and short-term) and management; skin changes/hand-foot syndrome, anemia, neutropenia, thrombocytopenia, diarrhea/constipation, hair loss syndrome, memory changes/ \"chemobrain\", mouth sores, taste changes, neuropathy, fatigue, infertility, myelosuppression, and risk of extravasation or infiltration.    Infection prevention and monitoring of lab values, what lab tests and what changes of these values meant, along with the possibility of hydration or blood product transfusion, or the need to defer or hold treatment.      General Chemotherapy Information, including ways it is excreted from the body and cleaning and containment of vomitus or other bodily fluid, use of the bathroom, sexual health and intimacy, what to do if needing to miss a treatment, when to call a provider and the need for staff to wear protective equipment.    Hand Outs Given:  Given specific handouts regarding Getting Ready for Chemotherapy, Chemo-induced nausea and vomiting, Chemo-induced constipation, Chemo-induced diarrhea, Chemo-induced fatigue, and Chemo-induced mouth sores.     ASSESSMENT/PLAN:    1. 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 Patient presented today for education. We reviewed all possible side effects of Cisplatin. He consented to therapy. He will have his port placed Friday and simulation completed next week. Once Rad Onc knows of start date, we will get his calendar together. We did discuss the likelihood of weekly fluids and the need to follow-up with a provider each visit. I asked that he bring in copies of med list (specifically all his vitamins and the dosage) and a copy of his ACP so we can get it scanned into his chart. He will follow-up prior to week 1.     I encouraged patient to call with any questions or " concerns.    100 minutes spent in the patient's encounter today with time spent in review of the chart along with in chart preparation.  Time was spent in review of the treamtent plan.  Time was also spent obtaining a review of systems and performing a physical exam.  Time was spent in review of all planned medications for treatment with the patient and questions answered.     СЕРГЕЙ Garber [facility]  Medical Oncology

## 2022-11-07 ENCOUNTER — TELEPHONE (OUTPATIENT)
Dept: ONCOLOGY | Facility: OTHER | Age: 62
End: 2022-11-07

## 2022-11-07 ENCOUNTER — PREP FOR PROCEDURE (OUTPATIENT)
Dept: SURGERY | Facility: OTHER | Age: 62
End: 2022-11-07

## 2022-11-07 ENCOUNTER — TELEPHONE (OUTPATIENT)
Dept: SURGERY | Facility: OTHER | Age: 62
End: 2022-11-07
Payer: COMMERCIAL

## 2022-11-07 ENCOUNTER — ONCOLOGY VISIT (OUTPATIENT)
Dept: ONCOLOGY | Facility: OTHER | Age: 62
End: 2022-11-07
Attending: NURSE PRACTITIONER
Payer: COMMERCIAL

## 2022-11-07 VITALS
OXYGEN SATURATION: 94 % | RESPIRATION RATE: 18 BRPM | SYSTOLIC BLOOD PRESSURE: 138 MMHG | TEMPERATURE: 97.8 F | HEART RATE: 68 BPM | DIASTOLIC BLOOD PRESSURE: 78 MMHG | BODY MASS INDEX: 35.98 KG/M2 | HEIGHT: 69 IN | WEIGHT: 242.95 LBS

## 2022-11-07 DIAGNOSIS — C09.9 MALIGNANT NEOPLASM OF TONSIL (H): Primary | ICD-10-CM

## 2022-11-07 PROCEDURE — 99215 OFFICE O/P EST HI 40 MIN: CPT | Performed by: NURSE PRACTITIONER

## 2022-11-07 PROCEDURE — 99417 PROLNG OP E/M EACH 15 MIN: CPT | Performed by: NURSE PRACTITIONER

## 2022-11-07 RX ORDER — IBUPROFEN 400 MG/1
400 TABLET, FILM COATED ORAL EVERY 6 HOURS PRN
COMMUNITY
End: 2022-12-06

## 2022-11-07 ASSESSMENT — PAIN SCALES - GENERAL: PAINLEVEL: NO PAIN (0)

## 2022-11-07 NOTE — TELEPHONE ENCOUNTER
Referral received for port-a-cath placement for malignant neoplasm of tonsil.   Patient scheduled for port-a-cath placement on 11/11/22 with Dr. Avila at [facility].  Instructions given via phone and sent to patient via [employer].   COVID-19 test: at home  Preop: n/a  Rosa Shaffer LPN

## 2022-11-07 NOTE — PATIENT INSTRUCTIONS
Thank you for allowing me to be a part of your care today.    Plan on port placement this Friday and simulation next Wednesday. Once radiation has a start date, we will work on scheduling him for chemo.    If you have any questions please call [phone]    Other instructions:      Nutrition referral.     **Bring copies of med list and ACP.

## 2022-11-07 NOTE — NURSING NOTE
"Oncology Rooming Note    November 7, 2022 12:49 PM   Melchor Sadler is a 62 year old male who presents for:    Chief Complaint   Patient presents with     Oncology Clinic Visit     Chemo Ed.     Initial Vitals: /78   Pulse 68   Temp 97.8  F (36.6  C) (Tympanic)   Resp 18   Ht 1.753 m (5' 9\")   Wt 110.2 kg (242 lb 15.2 oz)   SpO2 94%   BMI 35.88 kg/m   Estimated body mass index is 35.88 kg/m  as calculated from the following:    Height as of this encounter: 1.753 m (5' 9\").    Weight as of this encounter: 110.2 kg (242 lb 15.2 oz). Body surface area is 2.32 meters squared.  No Pain (0) Comment: Data Unavailable   No LMP for male patient.  Allergies reviewed: Yes  Medications reviewed: Yes    Medications: Medication refills not needed today.  Pharmacy name entered into [facility]: [facility] [address] LPN              "

## 2022-11-09 ENCOUNTER — ANESTHESIA EVENT (OUTPATIENT)
Dept: SURGERY | Facility: HOSPITAL | Age: 62
End: 2022-11-09
Payer: COMMERCIAL

## 2022-11-10 NOTE — ANESTHESIA PREPROCEDURE EVALUATION
Anesthesia Pre-Procedure Evaluation    Patient: Melchor Sadler   MRN: [id number] : 1960        Procedure : Procedure(s):  Port-a-cath placement          Past Medical History:   Diagnosis Date     Malignant neoplasm of tonsil (H) 10/04/2022    Per [facility] Chart     Oropharyngeal cancer (H) 2022    Per [facility] Chart     GLENDA (obstructive sleep apnea) 2022    Per [facility] chart      Past Surgical History:   Procedure Laterality Date     Panendoscopy, biopsy left palate  2022    [facility]-Dr. Jamey Houstno      Allergies   Allergen Reactions     Codeine Other (See Comments)     Anxiety     Penicillins Anxiety      Social History     Tobacco Use     Smoking status: Never     Smokeless tobacco: Former     Types: Chew     Quit date:    Substance Use Topics     Alcohol use: Not Currently      Wt Readings from Last 1 Encounters:   22 110.2 kg (242 lb 15.2 oz)        Anesthesia Evaluation   Pt has had prior anesthetic.     No history of anesthetic complications       ROS/MED HX  ENT/Pulmonary:     (+) sleep apnea (non-compliant with CPAP), doesn't use CPAP,     Neurologic:  - neg neurologic ROS     Cardiovascular:  - neg cardiovascular ROS     METS/Exercise Tolerance: >4 METS    Hematologic:  - neg hematologic  ROS     Musculoskeletal:  - neg musculoskeletal ROS     GI/Hepatic:  - neg GI/hepatic ROS     Renal/Genitourinary:  - neg Renal ROS     Endo:     (+) Obesity,     Psychiatric/Substance Use:       Infectious Disease:  - neg infectious disease ROS     Malignancy:   (+) Malignancy, History of Other.Other CA tonsil, plan to start chemo after port status post Chemo.    Other:  - neg other ROS          Physical Exam    Airway        Mallampati: III   TM distance: > 3 FB   Neck ROM: full   Mouth opening: > 3 cm    Respiratory Devices and Support         Dental  no notable dental history         Cardiovascular   cardiovascular exam normal       Rhythm and rate: regular and normal      Pulmonary   pulmonary exam normal        breath sounds clear to auscultation           OUTSIDE LABS:  CBC:   Lab Results   Component Value Date    HGB 16.5 09/15/2022    HGB 16.4 12/14/2016    HCT 47.0 12/14/2016     12/14/2016     BMP:   Lab Results   Component Value Date     09/15/2022     12/14/2016    POTASSIUM 4.0 09/15/2022    POTASSIUM 4.3 12/14/2016    CHLORIDE 103 09/15/2022    CHLORIDE 103 12/14/2016    CO2 27 09/15/2022    CO2 23 12/14/2016    BUN 17 09/15/2022    BUN 19 12/14/2016    CR 0.86 09/15/2022    CR 1.02 12/14/2016    GLC 92 09/15/2022    GLC 85 12/14/2016     COAGS: No results found for: PTT, INR, FIBR  POC: No results found for: BGM, HCG, HCGS  HEPATIC:   Lab Results   Component Value Date    ALBUMIN 4.0 12/14/2016    PROTTOTAL 7.5 12/14/2016    ALKPHOS 57 12/14/2016    BILITOTAL 0.8 12/14/2016     OTHER:   Lab Results   Component Value Date    CONSTANCE 9.9 09/15/2022    LIPASE 16.4 12/14/2016       Anesthesia Plan    ASA Status:  3   NPO Status:  NPO Appropriate    Anesthesia Type: MAC.     - Reason for MAC: straight local not clinically adequate, immobility needed              Consents    Anesthesia Plan(s) and associated risks, benefits, and realistic alternatives discussed. Questions answered and patient/representative(s) expressed understanding.     - Discussed: Risks, Benefits and Alternatives for BOTH SEDATION and the PROCEDURE were discussed     - Discussed with:  Patient      - Extended Intubation/Ventilatory Support Discussed: No.      - Patient is DNR/DNI Status: No    Use of blood products discussed: No .     Postoperative Care    Pain management: IV analgesics.   PONV prophylaxis: Ondansetron (or other 5HT-3), Dexamethasone or Solumedrol     Comments:    Other Comments: Risks and benefits of MAC anesthetic discussed including dental damage, aspiration, loss of airway, conversion to general anesthetic, CV complications, MI, stroke, death. Pt wishes to proceed.              Jah Gautam, СЕРГЕЙ CRNA

## 2022-11-11 ENCOUNTER — HOSPITAL ENCOUNTER (OUTPATIENT)
Facility: HOSPITAL | Age: 62
Discharge: HOME | End: 2022-11-11
Attending: SURGERY | Admitting: SURGERY
Payer: COMMERCIAL

## 2022-11-11 ENCOUNTER — ANESTHESIA (OUTPATIENT)
Dept: SURGERY | Facility: HOSPITAL | Age: 62
End: 2022-11-11
Payer: COMMERCIAL

## 2022-11-11 ENCOUNTER — APPOINTMENT (OUTPATIENT)
Dept: GENERAL RADIOLOGY | Facility: HOSPITAL | Age: 62
End: 2022-11-11
Attending: SURGERY
Payer: COMMERCIAL

## 2022-11-11 VITALS
RESPIRATION RATE: 16 BRPM | HEART RATE: 56 BPM | BODY MASS INDEX: 35.25 KG/M2 | TEMPERATURE: 97.1 F | HEIGHT: 69 IN | SYSTOLIC BLOOD PRESSURE: 133 MMHG | DIASTOLIC BLOOD PRESSURE: 86 MMHG | WEIGHT: 238 LBS | OXYGEN SATURATION: 95 %

## 2022-11-11 LAB — GLUCOSE BLDC GLUCOMTR-MCNC: 118 MG/DL (ref 70–99)

## 2022-11-11 PROCEDURE — 999N000141 HC STATISTIC PRE-PROCEDURE NURSING ASSESSMENT: Performed by: SURGERY

## 2022-11-11 PROCEDURE — 710N000012 HC RECOVERY PHASE 2, PER MINUTE: Performed by: SURGERY

## 2022-11-11 PROCEDURE — 272N000001 HC OR GENERAL SUPPLY STERILE: Performed by: SURGERY

## 2022-11-11 PROCEDURE — 258N000003 HC RX IP 258 OP 636: Performed by: NURSE ANESTHETIST, CERTIFIED REGISTERED

## 2022-11-11 PROCEDURE — 360N000083 HC SURGERY LEVEL 3 W/ FLUORO, PER MIN: Performed by: SURGERY

## 2022-11-11 PROCEDURE — 250N000009 HC RX 250: Performed by: NURSE ANESTHETIST, CERTIFIED REGISTERED

## 2022-11-11 PROCEDURE — 36561 INSERT TUNNELED CV CATH: CPT | Performed by: NURSE ANESTHETIST, CERTIFIED REGISTERED

## 2022-11-11 PROCEDURE — 370N000017 HC ANESTHESIA TECHNICAL FEE, PER MIN: Performed by: SURGERY

## 2022-11-11 PROCEDURE — 250N000011 HC RX IP 250 OP 636: Performed by: SURGERY

## 2022-11-11 PROCEDURE — C1788 PORT, INDWELLING, IMP: HCPCS | Performed by: SURGERY

## 2022-11-11 PROCEDURE — 250N000011 HC RX IP 250 OP 636: Performed by: NURSE ANESTHETIST, CERTIFIED REGISTERED

## 2022-11-11 PROCEDURE — 999N000180 XR SURGERY CARM FLUORO LESS THAN 5 MIN: Mod: TC

## 2022-11-11 PROCEDURE — 258N000003 HC RX IP 258 OP 636: Performed by: SURGERY

## 2022-11-11 PROCEDURE — 36561 INSERT TUNNELED CV CATH: CPT | Performed by: SURGERY

## 2022-11-11 PROCEDURE — 250N000009 HC RX 250: Performed by: SURGERY

## 2022-11-11 PROCEDURE — 82962 GLUCOSE BLOOD TEST: CPT

## 2022-11-11 DEVICE — CATH PORT MRI POWERPORT 8FR SL 1808000: Type: IMPLANTABLE DEVICE | Site: CHEST | Status: FUNCTIONAL

## 2022-11-11 RX ORDER — CEFAZOLIN SODIUM/WATER 2 G/20 ML
2 SYRINGE (ML) INTRAVENOUS SEE ADMIN INSTRUCTIONS
Status: DISCONTINUED | OUTPATIENT
Start: 2022-11-11 | End: 2022-11-11 | Stop reason: HOSPADM

## 2022-11-11 RX ORDER — FENTANYL CITRATE 50 UG/ML
50 INJECTION, SOLUTION INTRAMUSCULAR; INTRAVENOUS EVERY 5 MIN PRN
Status: DISCONTINUED | OUTPATIENT
Start: 2022-11-11 | End: 2022-11-11 | Stop reason: HOSPADM

## 2022-11-11 RX ORDER — SODIUM CHLORIDE, SODIUM LACTATE, POTASSIUM CHLORIDE, CALCIUM CHLORIDE 600; 310; 30; 20 MG/100ML; MG/100ML; MG/100ML; MG/100ML
INJECTION, SOLUTION INTRAVENOUS CONTINUOUS
Status: DISCONTINUED | OUTPATIENT
Start: 2022-11-11 | End: 2022-11-11 | Stop reason: HOSPADM

## 2022-11-11 RX ORDER — KETAMINE HYDROCHLORIDE 10 MG/ML
INJECTION INTRAMUSCULAR; INTRAVENOUS PRN
Status: DISCONTINUED | OUTPATIENT
Start: 2022-11-11 | End: 2022-11-11

## 2022-11-11 RX ORDER — HYDRALAZINE HYDROCHLORIDE 20 MG/ML
2.5-5 INJECTION INTRAMUSCULAR; INTRAVENOUS EVERY 10 MIN PRN
Status: DISCONTINUED | OUTPATIENT
Start: 2022-11-11 | End: 2022-11-11 | Stop reason: HOSPADM

## 2022-11-11 RX ORDER — BUPIVACAINE HYDROCHLORIDE AND EPINEPHRINE 2.5; 5 MG/ML; UG/ML
INJECTION, SOLUTION EPIDURAL; INFILTRATION; INTRACAUDAL; PERINEURAL
Status: DISCONTINUED
Start: 2022-11-11 | End: 2022-11-11 | Stop reason: HOSPADM

## 2022-11-11 RX ORDER — CEFAZOLIN SODIUM/WATER 2 G/20 ML
2 SYRINGE (ML) INTRAVENOUS
Status: COMPLETED | OUTPATIENT
Start: 2022-11-11 | End: 2022-11-11

## 2022-11-11 RX ORDER — LIDOCAINE HYDROCHLORIDE 20 MG/ML
INJECTION, SOLUTION INFILTRATION; PERINEURAL PRN
Status: DISCONTINUED | OUTPATIENT
Start: 2022-11-11 | End: 2022-11-11

## 2022-11-11 RX ORDER — FENTANYL CITRATE 50 UG/ML
50 INJECTION, SOLUTION INTRAMUSCULAR; INTRAVENOUS
Status: DISCONTINUED | OUTPATIENT
Start: 2022-11-11 | End: 2022-11-11 | Stop reason: HOSPADM

## 2022-11-11 RX ORDER — LIDOCAINE 40 MG/G
CREAM TOPICAL
Status: DISCONTINUED | OUTPATIENT
Start: 2022-11-11 | End: 2022-11-11 | Stop reason: HOSPADM

## 2022-11-11 RX ORDER — ONDANSETRON 4 MG/1
4 TABLET, ORALLY DISINTEGRATING ORAL EVERY 30 MIN PRN
Status: DISCONTINUED | OUTPATIENT
Start: 2022-11-11 | End: 2022-11-11 | Stop reason: HOSPADM

## 2022-11-11 RX ORDER — NALOXONE HYDROCHLORIDE 0.4 MG/ML
0.4 INJECTION, SOLUTION INTRAMUSCULAR; INTRAVENOUS; SUBCUTANEOUS
Status: DISCONTINUED | OUTPATIENT
Start: 2022-11-11 | End: 2022-11-11 | Stop reason: HOSPADM

## 2022-11-11 RX ORDER — LIDOCAINE HYDROCHLORIDE 10 MG/ML
INJECTION, SOLUTION EPIDURAL; INFILTRATION; INTRACAUDAL; PERINEURAL
Status: DISCONTINUED
Start: 2022-11-11 | End: 2022-11-11 | Stop reason: HOSPADM

## 2022-11-11 RX ORDER — GLYCOPYRROLATE 0.2 MG/ML
INJECTION, SOLUTION INTRAMUSCULAR; INTRAVENOUS PRN
Status: DISCONTINUED | OUTPATIENT
Start: 2022-11-11 | End: 2022-11-11

## 2022-11-11 RX ORDER — ONDANSETRON 2 MG/ML
4 INJECTION INTRAMUSCULAR; INTRAVENOUS EVERY 30 MIN PRN
Status: DISCONTINUED | OUTPATIENT
Start: 2022-11-11 | End: 2022-11-11 | Stop reason: HOSPADM

## 2022-11-11 RX ORDER — HEPARIN SODIUM 1000 [USP'U]/ML
INJECTION, SOLUTION INTRAVENOUS; SUBCUTANEOUS PRN
Status: DISCONTINUED | OUTPATIENT
Start: 2022-11-11 | End: 2022-11-11 | Stop reason: HOSPADM

## 2022-11-11 RX ORDER — NALOXONE HYDROCHLORIDE 0.4 MG/ML
0.2 INJECTION, SOLUTION INTRAMUSCULAR; INTRAVENOUS; SUBCUTANEOUS
Status: DISCONTINUED | OUTPATIENT
Start: 2022-11-11 | End: 2022-11-11 | Stop reason: HOSPADM

## 2022-11-11 RX ORDER — ONDANSETRON 2 MG/ML
INJECTION INTRAMUSCULAR; INTRAVENOUS PRN
Status: DISCONTINUED | OUTPATIENT
Start: 2022-11-11 | End: 2022-11-11

## 2022-11-11 RX ORDER — FENTANYL CITRATE 50 UG/ML
INJECTION, SOLUTION INTRAMUSCULAR; INTRAVENOUS PRN
Status: DISCONTINUED | OUTPATIENT
Start: 2022-11-11 | End: 2022-11-11

## 2022-11-11 RX ORDER — DEXAMETHASONE SODIUM PHOSPHATE 10 MG/ML
INJECTION, SOLUTION INTRAMUSCULAR; INTRAVENOUS PRN
Status: DISCONTINUED | OUTPATIENT
Start: 2022-11-11 | End: 2022-11-11

## 2022-11-11 RX ORDER — PROPOFOL 10 MG/ML
INJECTION, EMULSION INTRAVENOUS CONTINUOUS PRN
Status: DISCONTINUED | OUTPATIENT
Start: 2022-11-11 | End: 2022-11-11

## 2022-11-11 RX ADMIN — FENTANYL CITRATE 50 MCG: 50 INJECTION, SOLUTION INTRAMUSCULAR; INTRAVENOUS at 09:38

## 2022-11-11 RX ADMIN — DEXAMETHASONE SODIUM PHOSPHATE 6 MG: 10 INJECTION, SOLUTION INTRAMUSCULAR; INTRAVENOUS at 09:43

## 2022-11-11 RX ADMIN — Medication 10 MG: at 09:55

## 2022-11-11 RX ADMIN — LIDOCAINE HYDROCHLORIDE 80 MG: 20 INJECTION, SOLUTION INFILTRATION; PERINEURAL at 09:41

## 2022-11-11 RX ADMIN — SODIUM CHLORIDE, POTASSIUM CHLORIDE, SODIUM LACTATE AND CALCIUM CHLORIDE: 600; 310; 30; 20 INJECTION, SOLUTION INTRAVENOUS at 08:18

## 2022-11-11 RX ADMIN — GLYCOPYRROLATE 0.2 MG: 0.2 INJECTION, SOLUTION INTRAMUSCULAR; INTRAVENOUS at 09:43

## 2022-11-11 RX ADMIN — MIDAZOLAM 2 MG: 1 INJECTION INTRAMUSCULAR; INTRAVENOUS at 09:38

## 2022-11-11 RX ADMIN — PROPOFOL 70 MCG/KG/MIN: 10 INJECTION, EMULSION INTRAVENOUS at 09:42

## 2022-11-11 RX ADMIN — Medication 2 G: at 09:36

## 2022-11-11 RX ADMIN — Medication 10 MG: at 09:41

## 2022-11-11 RX ADMIN — ONDANSETRON 4 MG: 2 INJECTION INTRAMUSCULAR; INTRAVENOUS at 09:52

## 2022-11-11 ASSESSMENT — ACTIVITIES OF DAILY LIVING (ADL)
ADLS_ACUITY_SCORE: 35
ADLS_ACUITY_SCORE: 35

## 2022-11-11 NOTE — ANESTHESIA POSTPROCEDURE EVALUATION
Patient: Melchor Sadler    Procedure: Procedure(s):  Port-a-cath placement       Anesthesia Type:  MAC    Note:  Disposition: Outpatient   Postop Pain Control: Uneventful            Sign Out: Well controlled pain   PONV: No   Neuro/Psych: Uneventful            Sign Out: Acceptable/Baseline neuro status   Airway/Respiratory: Uneventful            Sign Out: Acceptable/Baseline resp. status   CV/Hemodynamics: Uneventful            Sign Out: Acceptable CV status; No obvious hypovolemia; No obvious fluid overload   Other NRE: NONE   DID A NON-ROUTINE EVENT OCCUR? No           Last vitals:  Vitals Value Taken Time   /83 11/11/22 1115   Temp     Pulse 56 11/11/22 1115   Resp 16 11/11/22 1100   SpO2 94 % 11/11/22 1119   Vitals shown include unvalidated device data.    Electronically Signed By: СЕРГЕЙ Jimenez CRNA  November 11, 2022  11:52 AM

## 2022-11-11 NOTE — ANESTHESIA CARE TRANSFER NOTE
Patient: Melchor Sadler    Procedure: Procedure(s):  Port-a-cath placement       Diagnosis: Malignant neoplasm of tonsil (H) [C09.9]  Diagnosis Additional Information: No value filed.    Anesthesia Type:   MAC     Note:    Oropharynx: oropharynx clear of all foreign objects  Level of Consciousness: drowsy  Oxygen Supplementation: nasal cannula  Level of Supplemental Oxygen (L/min / FiO2): 2  Independent Airway: airway patency satisfactory and stable  Dentition: dentition unchanged  Vital Signs Stable: post-procedure vital signs reviewed and stable  Report to RN Given: handoff report given  Patient transferred to: Phase II    Handoff Report: Identifed the Patient, Identified the Reponsible Provider, Reviewed the pertinent medical history, Discussed the surgical course, Reviewed Intra-OP anesthesia mangement and issues during anesthesia, Set expectations for post-procedure period and Allowed opportunity for questions and acknowledgement of understanding      Vitals:  Vitals Value Taken Time   BP     Temp     Pulse     Resp     SpO2         Electronically Signed By: СЕРГЕЙ Jimenez CRNA  November 11, 2022  10:32 AM

## 2022-11-11 NOTE — OP NOTE
PREOPERATIVE DIAGNOSES:   1.  Malignant neoplasm of tonsil (H) [C09.9]   2.  Need for venous access with port for adjuvant chemotherapy.      POSTOPERATIVE DIAGNOSIS:     1.  Same   2.  Need for venous access with port for adjuvant chemotherapy.      PROCEDURE:  Insertion Port-A-Cath via US guidance.      HISTORY:  This 62 year old male with tonsillar cancer; adjuvant chemotherapy is planned.      DESCRIPTION OF PROCEDURE:  With the patient in the supine position on the operating table, general anesthesia was induced.  The requisite timeout pause was observed during which the patient's correct identity and planned procedure were confirmed by the operating room personnel in attendance. With the use of Ultrasound and with the patient in slight trendelenburg position the right  IJ was accessed and wire was confirmed to be in the SVC by XR. Then again with the use of local anesthetic a 5cm incision was made in the right chest and a subcutaneous pocket was created, then with the use of a tunneling device the catheter was tunneled to the neck incision. Then using seldinger technique the Right internal jugular vein was dilated under fluoroscopy and the split sheath left in place. The catheter was then introduced and pulled back into position right at the level of the right mainstem bronchus. The catheter aspirated easily. The port was then secured to the catheter with the provided locking system and secured in the pocket with 3-0 prolene suture. With the use of a samaniego needle the port was accessed and aspirated easily. The port was then flushed with heparinized saline.  The skin was then closed in layers with 3-0 vicryl and 4-0 monocryl suture and derma bond was placed over the top.      MD Lilia Chowdary actively first assisted during this operation providing necessary retraction and exposure as well as maintaining hemostasis and a clear operative field. This was helpful in allowing the operation to  proceed in a safe and expeditious manner. She was present for the entire duration of the operation.

## 2022-11-11 NOTE — OR NURSING
Patient and responsible adult given discharge instructions with no questions regarding instructions. Radha score 20. Pain level 0/10.  Discharged from unit via ambulation, declined wheelchair. Patient discharged to home.

## 2022-11-11 NOTE — H&P
Surgery Consult Clinic Note      RE: Melchor Sadler  : 1960        Chief Complaint:  Port a cath placement    History of Present Illness:  Melchor Sadler is a very pleasant 62 year old year old male who I am seeing at the request of Dr. 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  He  is planning to start chemotherapy after placement of the port a cath.  He denies any limitation to movement of his neck.  Reports no surgery to his neck or upper chest.  He specifically denies fever, chills, nausea, vomiting, chest pain, shortness of breath or palpitations.          Medical history:  Past Medical History:   Diagnosis Date     Malignant neoplasm of tonsil (H) 10/04/2022    Per [facility] Chart     Oropharyngeal cancer (H) 2022    Per [facility] Chart     GLENDA (obstructive sleep apnea) 2022    Per [facility] chart       Surgical history:  Past Surgical History:   Procedure Laterality Date     Panendoscopy, biopsy left palate  2022    [facility]-Dr. Jamey Houston       Family history:  Family History   Problem Relation Age of Onset     Lung Cancer Mother      Cancer Mother      Diabetes Father      Colon Cancer Maternal Grandmother      Hyperlipidemia No family hx of      Coronary Artery Disease No family hx of      Hypertension No family hx of      Breast Cancer No family hx of      Prostate Cancer No family hx of      Depression No family hx of      Anxiety Disorder No family hx of      Mental Illness No family hx of      Anesthesia Reaction No family hx of      Asthma No family hx of        Medications:  Prior to Admission medications    Medication Sig Start Date End Date Taking? Authorizing Provider   ibuprofen (ADVIL/MOTRIN) 400 MG tablet Take 400 mg by mouth every 6 hours as needed for moderate pain    Reported, Patient   NONFORMULARY Numerous vitamins. Unsure of dosages. Will bring to his next appt.    Reported, Patient  "      Allergies:  The patient is allergic to codeine and penicillins.  .  Social history:  Social History     Tobacco Use     Smoking status: Never     Smokeless tobacco: Former     Types: Chew     Quit date: 1995   Substance Use Topics     Alcohol use: Not Currently     Marital status: .    Review of Systems:    Constitutional: Negative for fever, chills and weight loss.   HENT: Negative for ear pain, nosebleeds, congestion, sore throat, tinnitus and ear discharge.    Eyes: Negative for blurred vision, double vision, photophobia and pain.   Respiratory: Negative for cough, hemoptysis, shortness of breath, wheezing and stridor.      Cardiovascular: Negative for chest pain, palpitations and orthopnea.   Gastrointestinal: Negative for heartburn, nausea, vomiting, abdominal pain and blood in stool  Genitourinary: Negative for urgency, frequency and hematuria.   Musculoskeletal: Negative for myalgias, back pain and joint pain.   Neurological: Negative for tingling, speech change and headaches.   Endo/Heme/Allergies: Does not bruise/bleed easily.   Psychiatric/Behavioral: Negative for depression, suicidal ideas and hallucinations. The patient is not nervous/anxious.    Physical Examination:  /90   Pulse 58   Temp 97.1  F (36.2  C) (Tympanic)   Resp 16   Ht 1.753 m (5' 9\")   Wt 108 kg (238 lb)   SpO2 95%   BMI 35.15 kg/m    General: Alert and orientedx4, no acute distress, well-developed/well-nourished, ambulating without assistance  HEENT: normocephalic atraumatic, extraocular movements intact, PERRL Sclerae anicteric; Trachea mideline, no JVD  Chest:   Clear to auscultation bilaterally.    Cardiac: S1S2 , regular rate and rhythm without additional sounds  Abdomen: Soft, non-tender, non-distended  Extremities: Cursory exam unremarkable.  No peripheral edema noted.  Skin: Warm, dry, less than 2 sec cap refill  Neuro: CN 2-12 grossly intact, no focal deficit, GCS 15  Psych: Pleasant, calm, asks " appropriate questions      Assessment/Plan:  Port a cath placement      I had a discussion with Melchor about the risk of bleeding, risk of infection, and risk of pneumothorax.  The patient verbalizes understanding that despite reducing risks it does not remove them completely.  He is requesting immediate placement of the port a cath.  The patient's questions were asked and answered.    We will proceed as scheduled with port a cath placement with Dr. Avila.     [facility] and [place] [phone]    Referring Provider:  No referring provider defined for this encounter.     Primary Care Provider:  Gerda Mckeon

## 2022-11-14 ENCOUNTER — DOCUMENTATION ONLY (OUTPATIENT)
Dept: RADIATION ONCOLOGY | Facility: HOSPITAL | Age: 62
End: 2022-11-14

## 2022-11-14 ENCOUNTER — OFFICE VISIT (OUTPATIENT)
Dept: FAMILY MEDICINE | Facility: OTHER | Age: 62
End: 2022-11-14
Attending: NURSE PRACTITIONER
Payer: COMMERCIAL

## 2022-11-14 VITALS — HEIGHT: 69 IN | WEIGHT: 238 LBS | BODY MASS INDEX: 35.25 KG/M2

## 2022-11-14 DIAGNOSIS — C09.9 MALIGNANT NEOPLASM OF TONSIL (H): ICD-10-CM

## 2022-11-14 PROCEDURE — 97802 MEDICAL NUTRITION INDIV IN: CPT | Mod: PN | Performed by: DIETITIAN, REGISTERED

## 2022-11-14 NOTE — PROGRESS NOTES
"Nutrition Assessment    Patient seen for outpatient MNT initial assessment.    Melchor is here with his wife, Sandra. They are preparing for radiation and chemo for Dx: tonsil cancer. Melchor currently  Eats lunch at work. Is at work 10 hours daily. Their son also helps with meal prep and shopping, and he  Has educated them and focusses on healthy eating choices. Melchor and Sandra are motivated to reduce symptoms  Related to chemo with balanced diet. They do eat Gluten-free for improved digestive symptoms.     Referring diagnosis: Cancer of the tonsil, nutrition for cancer treatment, BMI 35    Height: Height: 1.753 m (5' 9\")  Weight: 238#  BMI:35  BMI indication: 35-39.9 obesity (class 2)  Patient's Goal Weight: 190#  Note: weight loss is not a goal right now    Discussed proper protein intake @ 95 grams daily (1. Gram per Kg ideal body weight), spreading protein throughout the   Day. Encouraged to discuss pre-treatment eating with oncology team and follow directions.    Discussed nutrition concerns to watch for during treatment.    Provided resources and ideas for chemo related side effects.     GF diet reviewed      Education materials provided: recipes, food lists, GF meal plans, cancer treatment nutrition guide    Goals: (1) free from chewing/swallowing issues  (2) 5 small meals and snacks daily  (3) 9+ fruits and veggies daily  (4) healthy fats, lean proteins, fiber at every meal and snack    Patient had no barriers to learning, verbalized understanding, and compliance is expected.    Phone number provided for questions. Recommended follow-up in as needed.    Time spent: 60 min  KRISTIN K. KLINEFELTER, RD on 11/14/2022 at 2:26 PM              ROS      Physical Exam      "

## 2022-11-14 NOTE — PROGRESS NOTES
Spoke with Nory at [facility] and she states that she will call patient to schedule exam/x-rays. She stated that the earliest appointment they have would be on November 28th, 2022 at 8:00 AM.  She is going to reach out to the patient to see if this would be an adequate time for an appointment.  Nory states that she will call the patient to schedule this and then call back the radiation oncology department to inform department of date/time of appointment.

## 2022-11-14 NOTE — PROGRESS NOTES
Spoke with Nory at [facility] to obtain records.  Stated patient has not been seen since 2019 and patient had cancelled the appointment he had in 2021.  Nory is going to fax the records they do have on him to radiation oncology.

## 2022-11-14 NOTE — PROGRESS NOTES
"Attempted to reach patient per Bety Zaldivar CNP to discuss if he has seen a dentist recently and if so which dentist so that we may obtain a dentist note stating we may move forward with radiation treatment to the head and neck region. Left voicemail with multiple call back numbers.  Melchor returned call and states it has \"probably\" been more than a year since he has seen a dentist. His normal dentist is Dr. Reeves at [facility] in [place], [place].  Phone to this location is [phone].  "

## 2022-11-16 ENCOUNTER — ONCOLOGY VISIT (OUTPATIENT)
Dept: RADIATION ONCOLOGY | Facility: HOSPITAL | Age: 62
End: 2022-11-16
Attending: RADIOLOGY
Payer: COMMERCIAL

## 2022-11-16 ENCOUNTER — ALLIED HEALTH/NURSE VISIT (OUTPATIENT)
Dept: RADIATION ONCOLOGY | Facility: HOSPITAL | Age: 62
End: 2022-11-16
Payer: COMMERCIAL

## 2022-11-16 VITALS — BODY MASS INDEX: 35.24 KG/M2 | WEIGHT: 238.6 LBS

## 2022-11-16 DIAGNOSIS — C09.9 MALIGNANT NEOPLASM OF TONSIL (H): Primary | ICD-10-CM

## 2022-11-16 PROCEDURE — G0463 HOSPITAL OUTPT CLINIC VISIT: HCPCS

## 2022-11-16 NOTE — PROGRESS NOTES
Patient simulation completed today for Melchor Sadler 11/16/22.    Joey Wynne  November 16, 2022  11:58 AM    RADIATION ONCOLOGY FOLLOW-UP NOTE PRIOR TO CT SIMULATION    Diagnosis:  Malignant neoplasm of left tonsil   Cancer Staging   Malignant neoplasm of tonsil (H)  Staging form: Pharynx - Oropharynx, AJCC 8th Edition  - Clinical stage from 11/16/2022: Stage I (cT1, cN1, cM0, p16+) - Signed by Vicki Alaniz MD on 11/16/2022      The patient is a 62-year-old male who was seen initially in radiation oncology consultation on 10/26/2022.  At that time he was felt to have a stage I (cT1 cN1 cM0) poorly differentiated p16 positive squamous cell carcinoma of the left tonsil with ipsilateral neck everardo involvement.  A PET/CT had been ordered but was not available for review at the time of that consultation by Dr. Restrepo.    11/2/2022: PET/CT.  Findings: 4.0 x 3.5 cm large left anterior cervical chain lymph node metastasis with hypermetabolic uptake peak SUV of 18.6 anteromedial to the sternocleidomastoid just below the level of the angle of the mandible.  No evidence of increased uptake in the area of the known primary left tonsillar tumor.  There were other left axillary nodes that did not show any evidence of increased uptake.  There was no evidence of other metastatic disease.    The patient had not been having regular dental care and admits that it has been 3 years since his prior dental evaluation.  He said he works too much and is too busy to easily get to the dentist.  The dentist kindly agreed to see him 11/28/2022 to ensure that no dental work needs to be done as that would have to be completed prior to starting radiation therapy.    I discussed with the patient the likely side effects of a course of treatment including mucositis, dysphagia, xerostomia, change in taste.  He does not have a feeding tube and is aware that he cannot lose any weight during the course of therapy.  Despite several bouts with  COVID the patient remains unvaccinated.    Physical exam: Alert pleasant male in no distress.  Oral cavity: Ulcerative crater left tonsillar fossa with no other areas of abnormality.  He has multiple missing teeth and many that have mental amalgam.  Next: Large fixed left neck level 2/3 mass with no overlying skin change.  No other areas of palpable abnormality.    He signed informed consent.  He will undergo CT simulation today and return to start treatment once dental clearance has been obtained, treatment planning is complete and  activities have been done by physics.  It is planned that he will receive a total dose of 7000 cGy in 35 fractions of 200 cGy per fraction to the area of the primary tumor and upper neck adenopathy with lesser dose delivered to the remaining nodes and adjacent tissues on the left.  He will receive concurrent systemic therapy as per medical oncology weekly during the course of radiation.    His wife was present for the evaluation today.  We went over in detail the steps that will take place from the time of CT simulation to the time the treatment is started and they were provided with printed information regarding same.    Vicki Alaniz MD    Time spent in interaction with the patient, review of medical records, documentation, care coordination, review of imaging and physical examination: 30 minutes    Note to patient: Medical notes are made available to patients in the interest of transparency.  Be advised that this is a medical document and intended as a peer to peer communication.  It is written using medical language and may contain abbreviations or verbiage that is unfamiliar.  It may appear blunt or direct.  Medical documentation is intended to carry relevant information, fax is evident in the clinical opinion of the practitioner.  Further clarification can be obtained by scheduling a follow-up evaluation with the practitioner.

## 2022-11-16 NOTE — PROCEDURES
Narrative:    The patient agreed to proceed with radiation therapy.  Tests and supporting medical records were interpreted to assist in defining the tumor location and the extent of disease.  In order to accomplish the plan I ordered a simulation which is necessary to accomplish a reproducible treatment position so that optimal treatment planning can be done prior to the commencement of radiation therapy.    The patient was taken to the CT simulator and after fabrication of appropriate immobilization devices images were obtained for the purposes of treatment planning.    I was present for and supervised the procedure including fabrication of all devices and approval of the patient's position for treatment.  I reviewed the CT images prior to export and found them to be appropriate for treatment planning purposes.    The patient will return to start treatment once treatment planning and  activities have been completed.    Vicki Alaniz MD

## 2022-11-25 ENCOUNTER — TELEPHONE (OUTPATIENT)
Dept: INFUSION THERAPY | Facility: OTHER | Age: 62
End: 2022-11-25

## 2022-11-25 NOTE — TELEPHONE ENCOUNTER
LVM for pt advised still working on coordinating schedules but will be done by when he comes in for his first apt

## 2022-11-29 ENCOUNTER — OFFICE VISIT (OUTPATIENT)
Dept: RADIATION ONCOLOGY | Facility: HOSPITAL | Age: 62
End: 2022-11-29
Payer: COMMERCIAL

## 2022-11-29 ENCOUNTER — DOCUMENTATION ONLY (OUTPATIENT)
Dept: ONCOLOGY | Facility: OTHER | Age: 62
End: 2022-11-29

## 2022-11-29 ENCOUNTER — APPOINTMENT (OUTPATIENT)
Dept: RADIATION ONCOLOGY | Facility: HOSPITAL | Age: 62
End: 2022-11-29
Payer: COMMERCIAL

## 2022-11-29 ENCOUNTER — INFUSION THERAPY VISIT (OUTPATIENT)
Dept: INFUSION THERAPY | Facility: OTHER | Age: 62
End: 2022-11-29
Attending: FAMILY MEDICINE
Payer: COMMERCIAL

## 2022-11-29 ENCOUNTER — RESULTS ONLY (OUTPATIENT)
Dept: RADIATION ONCOLOGY | Facility: HOSPITAL | Age: 62
End: 2022-11-29

## 2022-11-29 VITALS
TEMPERATURE: 98.3 F | WEIGHT: 245.59 LBS | RESPIRATION RATE: 18 BRPM | HEIGHT: 69 IN | DIASTOLIC BLOOD PRESSURE: 59 MMHG | BODY MASS INDEX: 36.38 KG/M2 | SYSTOLIC BLOOD PRESSURE: 127 MMHG | HEART RATE: 66 BPM

## 2022-11-29 VITALS
HEART RATE: 63 BPM | BODY MASS INDEX: 36.22 KG/M2 | DIASTOLIC BLOOD PRESSURE: 86 MMHG | OXYGEN SATURATION: 98 % | WEIGHT: 245.3 LBS | TEMPERATURE: 97.2 F | SYSTOLIC BLOOD PRESSURE: 132 MMHG | RESPIRATION RATE: 19 BRPM

## 2022-11-29 DIAGNOSIS — C09.9 MALIGNANT NEOPLASM OF TONSIL (H): Primary | ICD-10-CM

## 2022-11-29 LAB
ALBUMIN SERPL BCG-MCNC: 4.1 G/DL (ref 3.5–5.2)
ALP SERPL-CCNC: 70 U/L (ref 40–129)
ALT SERPL W P-5'-P-CCNC: 32 U/L (ref 10–50)
ANION GAP SERPL CALCULATED.3IONS-SCNC: 8 MMOL/L (ref 7–15)
AST SERPL W P-5'-P-CCNC: 29 U/L (ref 10–50)
BASOPHILS # BLD AUTO: 0 10E3/UL (ref 0–0.2)
BASOPHILS NFR BLD AUTO: 1 %
BILIRUB SERPL-MCNC: 0.6 MG/DL
BUN SERPL-MCNC: 17.3 MG/DL (ref 8–23)
CALCIUM SERPL-MCNC: 9.1 MG/DL (ref 8.8–10.2)
CHLORIDE SERPL-SCNC: 102 MMOL/L (ref 98–107)
CREAT SERPL-MCNC: 0.8 MG/DL (ref 0.67–1.17)
DEPRECATED HCO3 PLAS-SCNC: 26 MMOL/L (ref 22–29)
EOSINOPHIL # BLD AUTO: 0.1 10E3/UL (ref 0–0.7)
EOSINOPHIL NFR BLD AUTO: 1 %
ERYTHROCYTE [DISTWIDTH] IN BLOOD BY AUTOMATED COUNT: 11.9 % (ref 10–15)
GFR SERPL CREATININE-BSD FRML MDRD: >90 ML/MIN/1.73M2
GLUCOSE SERPL-MCNC: 103 MG/DL (ref 70–99)
HCT VFR BLD AUTO: 46.9 % (ref 40–53)
HGB BLD-MCNC: 16.6 G/DL (ref 13.3–17.7)
IMM GRANULOCYTES # BLD: 0 10E3/UL
IMM GRANULOCYTES NFR BLD: 0 %
LYMPHOCYTES # BLD AUTO: 2 10E3/UL (ref 0.8–5.3)
LYMPHOCYTES NFR BLD AUTO: 27 %
MAGNESIUM SERPL-MCNC: 2 MG/DL (ref 1.7–2.3)
MCH RBC QN AUTO: 31.1 PG (ref 26.5–33)
MCHC RBC AUTO-ENTMCNC: 35.4 G/DL (ref 31.5–36.5)
MCV RBC AUTO: 88 FL (ref 78–100)
MONOCYTES # BLD AUTO: 0.6 10E3/UL (ref 0–1.3)
MONOCYTES NFR BLD AUTO: 8 %
NEUTROPHILS # BLD AUTO: 4.6 10E3/UL (ref 1.6–8.3)
NEUTROPHILS NFR BLD AUTO: 63 %
NRBC # BLD AUTO: 0 10E3/UL
NRBC BLD AUTO-RTO: 0 /100
PLATELET # BLD AUTO: 233 10E3/UL (ref 150–450)
POTASSIUM SERPL-SCNC: 4.1 MMOL/L (ref 3.4–5.3)
PROT SERPL-MCNC: 6.9 G/DL (ref 6.4–8.3)
RAD ONC ARIA COURSE ID: NORMAL
RAD ONC ARIA COURSE LAST TREATMENT DATE: NORMAL
RAD ONC ARIA COURSE START DATE: NORMAL
RAD ONC ARIA COURSE TREATMENT ELAPSED DAYS: 0
RAD ONC ARIA FIRST TREATMENT DATE: NORMAL
RAD ONC ARIA PLAN FRACTIONS TREATED TO DATE: 1
RAD ONC ARIA PLAN ID: NORMAL
RAD ONC ARIA PLAN PRESCRIBED DOSE PER FRACTION: 2 GY
RAD ONC ARIA PLAN TOTAL FRACTIONS PRESCRIBED: 35
RAD ONC ARIA PLAN TOTAL PRESCRIBED DOSE: 7000 CGY
RAD ONC ARIA REFERENCE POINT DOSAGE GIVEN TO DATE: NORMAL GY
RAD ONC ARIA REFERENCE POINT DOSAGE GIVEN TO DATE: NORMAL GY
RAD ONC ARIA REFERENCE POINT ID: NORMAL
RAD ONC ARIA REFERENCE POINT ID: NORMAL
RBC # BLD AUTO: 5.33 10E6/UL (ref 4.4–5.9)
SODIUM SERPL-SCNC: 136 MMOL/L (ref 136–145)
WBC # BLD AUTO: 7.4 10E3/UL (ref 4–11)

## 2022-11-29 PROCEDURE — 96413 CHEMO IV INFUSION 1 HR: CPT | Performed by: INTERNAL MEDICINE

## 2022-11-29 PROCEDURE — 96375 TX/PRO/DX INJ NEW DRUG ADDON: CPT | Performed by: INTERNAL MEDICINE

## 2022-11-29 PROCEDURE — 96367 TX/PROPH/DG ADDL SEQ IV INF: CPT | Performed by: INTERNAL MEDICINE

## 2022-11-29 PROCEDURE — 85025 COMPLETE CBC W/AUTO DIFF WBC: CPT | Performed by: INTERNAL MEDICINE

## 2022-11-29 PROCEDURE — 80053 COMPREHEN METABOLIC PANEL: CPT | Performed by: INTERNAL MEDICINE

## 2022-11-29 PROCEDURE — 83735 ASSAY OF MAGNESIUM: CPT | Performed by: INTERNAL MEDICINE

## 2022-11-29 PROCEDURE — 36591 DRAW BLOOD OFF VENOUS DEVICE: CPT | Performed by: INTERNAL MEDICINE

## 2022-11-29 RX ORDER — HEPARIN SODIUM (PORCINE) LOCK FLUSH IV SOLN 100 UNIT/ML 100 UNIT/ML
5 SOLUTION INTRAVENOUS
Status: DISCONTINUED | OUTPATIENT
Start: 2022-11-29 | End: 2022-11-29 | Stop reason: HOSPADM

## 2022-11-29 RX ORDER — PALONOSETRON 0.05 MG/ML
0.25 INJECTION, SOLUTION INTRAVENOUS ONCE
Status: COMPLETED | OUTPATIENT
Start: 2022-11-29 | End: 2022-11-29

## 2022-11-29 RX ADMIN — HEPARIN SODIUM (PORCINE) LOCK FLUSH IV SOLN 100 UNIT/ML 5 ML: 100 SOLUTION at 12:40

## 2022-11-29 RX ADMIN — PALONOSETRON 0.25 MG: 0.05 INJECTION, SOLUTION INTRAVENOUS at 10:31

## 2022-11-29 RX ADMIN — Medication 1000 ML: at 10:27

## 2022-11-29 ASSESSMENT — PAIN SCALES - GENERAL: PAINLEVEL: NO PAIN (0)

## 2022-11-29 NOTE — PROGRESS NOTES
[facility] / [facility] Chemotherapy Consult    The inpatient pharmacist has been consulted to review the patient's chart for  the following: any significant drug interactions between home medications, new take home medications, pre-medications, PRN medications, chemotherapy agents, and chemotherapy regimen.     Current Outpatient Medications   Medication Sig     dexamethasone (DECADRON) 4 MG tablet Take 2 tablets (8 mg) by mouth daily Take for 3 days, starting the day after chemo on day 2 and 9. Take with food. If no nausea and vomiting with first cisplatin doses, may stop dexamethasone.     ibuprofen (ADVIL/MOTRIN) 400 MG tablet Take 400 mg by mouth every 6 hours as needed for moderate pain     NONFORMULARY Numerous vitamins. Unsure of dosages. Will bring to his next appt.     ondansetron (ZOFRAN) 8 MG tablet Take 1 tablet (8 mg) by mouth every 8 hours as needed for nausea (vomiting)     prochlorperazine (COMPAZINE) 10 MG tablet Take 1 tablet (10 mg) by mouth every 6 hours as needed for nausea or vomiting     No current facility-administered medications for this visit.       Take home medications:       Pre-Treats:       PRN medications:       Chemotherapy agents:       Cancer Being Treated: head and neck cancer     Difference in Regimen Ordered vs. Standard Regimen: -     The following interactions were found and will require patient education:     Drug-Drug interactions:   1. Dexamethasone & Ibuprofen: concurrent use may result in increased risk of GI ulcer or bleeding  2. Ondansetron & Prochlorperazine: may result in an increased risk of QT interval prolongation  3. Ondansetron & Palonosetron: may result in an increased risk of serotonin syndrome. Palonosetron has a long half-life (~40 hours). Recommend avoiding ondansetron for 48-72 hours after administration of palonosetron.   4.   The patient is taking numerous vitamins according to PTA med list. Recommend obtaining list and added to  medication list. Pharmacist should check drug interactions, especially if on supplements.     Drug-Allergy interactions: -     Drug-Food interactions: -    Drug-Ethanol interactions:   1. Prochlorperazine: may result in increased CNS depression and an increased risk of extrapyramidal reactions    Drug-Tobacco interactions: -    If there are any additional questions or concerns, please contact the inpatient pharmacy ([phone]) for further review.     Maricarmen Quinn RPH  November 29, 2022

## 2022-11-29 NOTE — PATIENT INSTRUCTIONS

## 2022-11-29 NOTE — PROGRESS NOTES
Patient is a 62 year old here accompanied by spouse today for infusion of cisplatin under the orders of Dr. Pruitt.  Right sided power port accessed with 19 gauge 1 inch 90 degree bent non coring needle.  Line flushed with 10 cc's normal saline.  Needle secured with sterile transparent dressing.  10 cc's blood discarded, and blood taken for 2 tubes of ordered labs.  Patient tolerated wel.  Denies pain and discomfort at this time.  Port flushes easily without resistance.    Hand hygiene performed: yes   Mask donned by caregiver: yes Site prepped with CHG: yes Labs drawn: yes Dressing applied using aseptic technique: yes   Component      Latest Ref Rng & Units 11/29/2022   WBC      4.0 - 11.0 10e3/uL 7.4   RBC Count      4.40 - 5.90 10e6/uL 5.33   Hemoglobin      13.3 - 17.7 g/dL 16.6   Hematocrit      40.0 - 53.0 % 46.9   MCV      78 - 100 fL 88   MCH      26.5 - 33.0 pg 31.1   MCHC      31.5 - 36.5 g/dL 35.4   RDW      10.0 - 15.0 % 11.9   Platelet Count      150 - 450 10e3/uL 233   % Neutrophils      % 63   % Lymphocytes      % 27   % Monocytes      % 8   % Eosinophils      % 1   % Basophils      % 1   % Immature Granulocytes      % 0   NRBCs per 100 WBC      <1 /100 0   Absolute Neutrophils      1.6 - 8.3 10e3/uL 4.6   Absolute Lymphocytes      0.8 - 5.3 10e3/uL 2.0   Absolute Monocytes      0.0 - 1.3 10e3/uL 0.6   Absolute Eosinophils      0.0 - 0.7 10e3/uL 0.1   Absolute Basophils      0.0 - 0.2 10e3/uL 0.0   Absolute Immature Granulocytes      <=0.4 10e3/uL 0.0   Absolute NRBCs      10e3/uL 0.0   Sodium      136 - 145 mmol/L 136   Potassium      3.4 - 5.3 mmol/L 4.1   Chloride      98 - 107 mmol/L 102   Carbon Dioxide (CO2)      22 - 29 mmol/L 26   Anion Gap      7 - 15 mmol/L 8   Urea Nitrogen      8.0 - 23.0 mg/dL 17.3   Creatinine      0.67 - 1.17 mg/dL 0.80   Calcium      8.8 - 10.2 mg/dL 9.1   Glucose      70 - 99 mg/dL 103 (H)   Alkaline Phosphatase      40 - 129 U/L 70   AST      10 - 50 U/L 29   ALT       10 - 50 U/L 32   Protein Total      6.4 - 8.3 g/dL 6.9   Albumin      3.5 - 5.2 g/dL 4.1   Bilirubin Total      <=1.2 mg/dL 0.6   GFR Estimate      >60 mL/min/1.73m2 >90   Magnesium      1.7 - 2.3 mg/dL 2.0       Cisplatin dose(s) verified with Kezia FALL RN prior to release of drug.    Patient meets parameters for today's infusion.  Denies questions or concerns regarding today's infusion and/or medications being administered.      Patient identified with two identifiers, order verified, and verbal consent for today's infusion obtained from patient.    1114 IV pump verified with Cisplatin dose, drug, and rate of administration. Infusion administered per protocol. Patient tolerated infusion well, no signs or symptoms of adverse reaction noted. Patient denies pain nor discomfort.     Needle removed, tip intact. Site clean, dry and intact. Covered with a sterile bandage, slight pressure applied for 30 seconds. Pt instructed to leave bandage intact for a minimum of one hour, and to call with questions or concerns. Copy of appointments, discharge instructions, and after visit summary (AVS) provided to patient. Patient states understanding, discharged ambulatory.

## 2022-11-29 NOTE — PROGRESS NOTES
Progress Notes  Encounter Date: Nov 29, 2022  СЕРГЕЙ Méndez CNP     RADIATION ONCOLOGY WEEKLY MANAGEMENT PROGRESS NOTE     Patient Care Team       Relationship Specialty Notifications Start End    No Ref-Primary, Physician PCP - General   11/14/22     Fax: [phone]         Maryam Cannon MD Assigned PCP   9/24/22     Phone: [phone] Fax: [phone] [address] [place] [address]    Vicki Alaniz MD Assigned Cancer Care Provider   11/26/22     Phone: [phone] Fax: [phone]         [address] [place] [address]                  DIAGNOSIS:  Cancer Staging   Malignant neoplasm of tonsil (H)  Staging form: Pharynx - Oropharynx, AJCC 8th Edition  - Clinical stage from 11/16/2022: Stage I (cT1, cN1, cM0, p16+) - Signed by Vicki Alaniz MD on 11/16/2022        RADIATION THERAPY:    Melchor Sadler has received 200 cGy to date to left tonsil.  Treatment 1/35  Total planned dose 7000 cGy   Systemic Therapy: Weekly cisplatin         SUBJECTIVE:    Currently he states feeling well, tolerated first treatment well, has first chemotherapy infusion today.         OBJECTIVE:    WEIGHT: 245 lbs 4.8 oz.  Examination reveals alert non ill appearing male patient, respirations easy non labored.  No mucositis, small left darkened region to the left posterior oropharynx (tonsillar fossa).   6 x 4.5 cm lymph node present to left side of neck.       IMPRESSION:  Routine tolerance to radiation therapy.       PLAN:  Continue treatment as planned.                 Treatment plan reviewed.  Contralateral neck not being treated. ACR Appropriatness Criteria allow for treating Ipsi neck-only for T0-T1 (up to 6 cm  For p16+) disease.       Medical record and imaging reviewed.      СЕРГЕЙ Méndez CNP, MD

## 2022-11-30 ENCOUNTER — RESULTS ONLY (OUTPATIENT)
Dept: RADIATION ONCOLOGY | Facility: HOSPITAL | Age: 62
End: 2022-11-30

## 2022-11-30 ENCOUNTER — APPOINTMENT (OUTPATIENT)
Dept: RADIATION ONCOLOGY | Facility: HOSPITAL | Age: 62
End: 2022-11-30
Payer: COMMERCIAL

## 2022-11-30 LAB
RAD ONC ARIA COURSE ID: NORMAL
RAD ONC ARIA COURSE LAST TREATMENT DATE: NORMAL
RAD ONC ARIA COURSE START DATE: NORMAL
RAD ONC ARIA COURSE TREATMENT ELAPSED DAYS: 1
RAD ONC ARIA FIRST TREATMENT DATE: NORMAL
RAD ONC ARIA PLAN FRACTIONS TREATED TO DATE: 2
RAD ONC ARIA PLAN ID: NORMAL
RAD ONC ARIA PLAN PRESCRIBED DOSE PER FRACTION: 2 GY
RAD ONC ARIA PLAN TOTAL FRACTIONS PRESCRIBED: 35
RAD ONC ARIA PLAN TOTAL PRESCRIBED DOSE: 7000 CGY
RAD ONC ARIA REFERENCE POINT DOSAGE GIVEN TO DATE: NORMAL GY
RAD ONC ARIA REFERENCE POINT DOSAGE GIVEN TO DATE: NORMAL GY
RAD ONC ARIA REFERENCE POINT ID: NORMAL
RAD ONC ARIA REFERENCE POINT ID: NORMAL

## 2022-11-30 PROCEDURE — 77014 HC CT GUIDE FOR PLACEMENT RADIATION THERAPY FIELDS: CPT | Performed by: RADIOLOGY

## 2022-11-30 PROCEDURE — 77014 PR CT GUIDE FOR PLACEMENT RADIATION THERAPY FIELDS: CPT | Mod: 26 | Performed by: RADIOLOGY

## 2022-11-30 PROCEDURE — 77386 HC IMRT TREATMENT DELIVERY, COMPLEX: CPT | Performed by: RADIOLOGY

## 2022-12-01 ENCOUNTER — RESULTS ONLY (OUTPATIENT)
Dept: RADIATION ONCOLOGY | Facility: HOSPITAL | Age: 62
End: 2022-12-01

## 2022-12-01 ENCOUNTER — APPOINTMENT (OUTPATIENT)
Dept: RADIATION ONCOLOGY | Facility: HOSPITAL | Age: 62
End: 2022-12-01
Payer: COMMERCIAL

## 2022-12-01 LAB
RAD ONC ARIA COURSE ID: NORMAL
RAD ONC ARIA COURSE LAST TREATMENT DATE: NORMAL
RAD ONC ARIA COURSE START DATE: NORMAL
RAD ONC ARIA COURSE TREATMENT ELAPSED DAYS: 2
RAD ONC ARIA FIRST TREATMENT DATE: NORMAL
RAD ONC ARIA PLAN FRACTIONS TREATED TO DATE: 3
RAD ONC ARIA PLAN ID: NORMAL
RAD ONC ARIA PLAN PRESCRIBED DOSE PER FRACTION: 2 GY
RAD ONC ARIA PLAN TOTAL FRACTIONS PRESCRIBED: 35
RAD ONC ARIA PLAN TOTAL PRESCRIBED DOSE: 7000 CGY
RAD ONC ARIA REFERENCE POINT DOSAGE GIVEN TO DATE: NORMAL GY
RAD ONC ARIA REFERENCE POINT DOSAGE GIVEN TO DATE: NORMAL GY
RAD ONC ARIA REFERENCE POINT ID: NORMAL
RAD ONC ARIA REFERENCE POINT ID: NORMAL

## 2022-12-01 PROCEDURE — 77386 HC IMRT TREATMENT DELIVERY, COMPLEX: CPT | Performed by: RADIOLOGY

## 2022-12-01 PROCEDURE — 77014 HC CT GUIDE FOR PLACEMENT RADIATION THERAPY FIELDS: CPT | Performed by: RADIOLOGY

## 2022-12-01 PROCEDURE — 77014 PR CT GUIDE FOR PLACEMENT RADIATION THERAPY FIELDS: CPT | Mod: 26 | Performed by: RADIOLOGY

## 2022-12-02 ENCOUNTER — APPOINTMENT (OUTPATIENT)
Dept: RADIATION ONCOLOGY | Facility: HOSPITAL | Age: 62
End: 2022-12-02
Payer: COMMERCIAL

## 2022-12-02 ENCOUNTER — RESULTS ONLY (OUTPATIENT)
Dept: RADIATION ONCOLOGY | Facility: HOSPITAL | Age: 62
End: 2022-12-02

## 2022-12-02 LAB
RAD ONC ARIA COURSE ID: NORMAL
RAD ONC ARIA COURSE LAST TREATMENT DATE: NORMAL
RAD ONC ARIA COURSE START DATE: NORMAL
RAD ONC ARIA COURSE TREATMENT ELAPSED DAYS: 3
RAD ONC ARIA FIRST TREATMENT DATE: NORMAL
RAD ONC ARIA PLAN FRACTIONS TREATED TO DATE: 4
RAD ONC ARIA PLAN ID: NORMAL
RAD ONC ARIA PLAN PRESCRIBED DOSE PER FRACTION: 2 GY
RAD ONC ARIA PLAN TOTAL FRACTIONS PRESCRIBED: 35
RAD ONC ARIA PLAN TOTAL PRESCRIBED DOSE: 7000 CGY
RAD ONC ARIA REFERENCE POINT DOSAGE GIVEN TO DATE: NORMAL GY
RAD ONC ARIA REFERENCE POINT DOSAGE GIVEN TO DATE: NORMAL GY
RAD ONC ARIA REFERENCE POINT ID: NORMAL
RAD ONC ARIA REFERENCE POINT ID: NORMAL

## 2022-12-02 PROCEDURE — 77014 PR CT GUIDE FOR PLACEMENT RADIATION THERAPY FIELDS: CPT | Mod: 26 | Performed by: RADIOLOGY

## 2022-12-02 PROCEDURE — 77014 HC CT GUIDE FOR PLACEMENT RADIATION THERAPY FIELDS: CPT | Performed by: RADIOLOGY

## 2022-12-02 PROCEDURE — 77386 HC IMRT TREATMENT DELIVERY, COMPLEX: CPT | Performed by: RADIOLOGY

## 2022-12-05 ENCOUNTER — APPOINTMENT (OUTPATIENT)
Dept: RADIATION ONCOLOGY | Facility: HOSPITAL | Age: 62
End: 2022-12-05
Payer: COMMERCIAL

## 2022-12-05 ENCOUNTER — RESULTS ONLY (OUTPATIENT)
Dept: RADIATION ONCOLOGY | Facility: HOSPITAL | Age: 62
End: 2022-12-05

## 2022-12-05 LAB
RAD ONC ARIA COURSE ID: NORMAL
RAD ONC ARIA COURSE LAST TREATMENT DATE: NORMAL
RAD ONC ARIA COURSE START DATE: NORMAL
RAD ONC ARIA COURSE TREATMENT ELAPSED DAYS: 6
RAD ONC ARIA FIRST TREATMENT DATE: NORMAL
RAD ONC ARIA PLAN FRACTIONS TREATED TO DATE: 5
RAD ONC ARIA PLAN ID: NORMAL
RAD ONC ARIA PLAN PRESCRIBED DOSE PER FRACTION: 2 GY
RAD ONC ARIA PLAN TOTAL FRACTIONS PRESCRIBED: 35
RAD ONC ARIA PLAN TOTAL PRESCRIBED DOSE: 7000 CGY
RAD ONC ARIA REFERENCE POINT DOSAGE GIVEN TO DATE: NORMAL GY
RAD ONC ARIA REFERENCE POINT DOSAGE GIVEN TO DATE: NORMAL GY
RAD ONC ARIA REFERENCE POINT ID: NORMAL
RAD ONC ARIA REFERENCE POINT ID: NORMAL

## 2022-12-05 PROCEDURE — 77386 HC IMRT TREATMENT DELIVERY, COMPLEX: CPT | Performed by: RADIOLOGY

## 2022-12-05 PROCEDURE — 77014 HC CT GUIDE FOR PLACEMENT RADIATION THERAPY FIELDS: CPT | Performed by: RADIOLOGY

## 2022-12-05 PROCEDURE — 77014 PR CT GUIDE FOR PLACEMENT RADIATION THERAPY FIELDS: CPT | Mod: 26 | Performed by: STUDENT IN AN ORGANIZED HEALTH CARE EDUCATION/TRAINING PROGRAM

## 2022-12-05 PROCEDURE — 77427 RADIATION TX MANAGEMENT X5: CPT | Performed by: STUDENT IN AN ORGANIZED HEALTH CARE EDUCATION/TRAINING PROGRAM

## 2022-12-05 PROCEDURE — 77336 RADIATION PHYSICS CONSULT: CPT | Performed by: RADIOLOGY

## 2022-12-05 NOTE — PROGRESS NOTES
"Progress Notes  Encounter Date: Dec 6, 2022  СЕРГЕЙ Méndez [facility]     RADIATION ONCOLOGY WEEKLY MANAGEMENT PROGRESS NOTE     Patient Care Team       Relationship Specialty Notifications Start End    No Ref-Primary, Physician PCP - General   11/14/22     Fax: [phone]         Maryam Cannon MD Assigned PCP   9/24/22     Phone: [phone] Fax: [phone] [address]  [place] [address]    Vicki Alaniz MD Assigned Cancer Care Provider   11/26/22     Phone: [phone] Fax: [phone]         [address] [place] [address]                  DIAGNOSIS:  Cancer Staging   Malignant neoplasm of tonsil (H)  Staging form: Pharynx - Oropharynx, AJCC 8th Edition  - Clinical stage from 11/16/2022: Stage I (cT1, cN1, cM0, p16+) - Signed by Vicki Alaniz MD on 11/16/2022        RADIATION THERAPY:    Melchor Sadler has received 1000 cGy to date to left tonsil.  Treatment 5/35  Total planned dose 7000 cGy   Systemic Therapy: Weekly cisplatin         SUBJECTIVE:  Feels treatment is going well.  Denies skin changes.  Feels like his throat is dry but he thinks it is due to his room being dry and night.  Feels his \"bump\" on his neck is already \"getting smaller\".      OBJECTIVE:    WEIGHT: 242 lbs 8 oz.  Examination reveals alert non ill appearing male patient, respirations easy non labored.  No mucositis, small left slightly ulcerated region to the left posterior oropharynx (tonsillar fossa).   6 x 4.5 cm lymph node present to left side of neck.       IMPRESSION:  Routine tolerance to radiation therapy.       PLAN:  Continue treatment as planned. Going to try a humidifier at night to see if it helps the dry feeling in his throat.                 Treatment plan reviewed.  Contralateral neck not being treated. ACR Appropriatness Criteria allow for treating Ipsi neck-only for T0-T1 (up to 6 cm  For p16+) disease.       Medical record and imaging reviewed.      СЕРГЕЙ Méndez CNP     "    Clive Restrepo MD  Radiation Oncologist    I saw this patient while providing locum tenens coverage.

## 2022-12-06 ENCOUNTER — ONCOLOGY VISIT (OUTPATIENT)
Dept: ONCOLOGY | Facility: OTHER | Age: 62
End: 2022-12-06
Attending: NURSE PRACTITIONER
Payer: COMMERCIAL

## 2022-12-06 ENCOUNTER — OFFICE VISIT (OUTPATIENT)
Dept: RADIATION ONCOLOGY | Facility: HOSPITAL | Age: 62
End: 2022-12-06
Payer: COMMERCIAL

## 2022-12-06 ENCOUNTER — LAB (OUTPATIENT)
Dept: INFUSION THERAPY | Facility: OTHER | Age: 62
End: 2022-12-06
Attending: NURSE PRACTITIONER
Payer: COMMERCIAL

## 2022-12-06 ENCOUNTER — RESULTS ONLY (OUTPATIENT)
Dept: RADIATION ONCOLOGY | Facility: HOSPITAL | Age: 62
End: 2022-12-06

## 2022-12-06 VITALS
RESPIRATION RATE: 19 BRPM | OXYGEN SATURATION: 93 % | HEART RATE: 68 BPM | BODY MASS INDEX: 35.79 KG/M2 | SYSTOLIC BLOOD PRESSURE: 128 MMHG | TEMPERATURE: 98.9 F | WEIGHT: 242.5 LBS | DIASTOLIC BLOOD PRESSURE: 86 MMHG

## 2022-12-06 VITALS — HEART RATE: 67 BPM | SYSTOLIC BLOOD PRESSURE: 117 MMHG | DIASTOLIC BLOOD PRESSURE: 61 MMHG

## 2022-12-06 VITALS
SYSTOLIC BLOOD PRESSURE: 128 MMHG | OXYGEN SATURATION: 95 % | BODY MASS INDEX: 35.76 KG/M2 | HEIGHT: 69 IN | RESPIRATION RATE: 18 BRPM | DIASTOLIC BLOOD PRESSURE: 74 MMHG | HEART RATE: 66 BPM | TEMPERATURE: 98.8 F | WEIGHT: 241.4 LBS

## 2022-12-06 DIAGNOSIS — C09.9 MALIGNANT NEOPLASM OF TONSIL (H): Primary | ICD-10-CM

## 2022-12-06 DIAGNOSIS — K59.03 DRUG-INDUCED CONSTIPATION: ICD-10-CM

## 2022-12-06 LAB
ALBUMIN SERPL BCG-MCNC: 3.8 G/DL (ref 3.5–5.2)
ALP SERPL-CCNC: 68 U/L (ref 40–129)
ALT SERPL W P-5'-P-CCNC: 34 U/L (ref 10–50)
ANION GAP SERPL CALCULATED.3IONS-SCNC: 10 MMOL/L (ref 7–15)
AST SERPL W P-5'-P-CCNC: 23 U/L (ref 10–50)
BASOPHILS # BLD AUTO: 0 10E3/UL (ref 0–0.2)
BASOPHILS NFR BLD AUTO: 0 %
BILIRUB SERPL-MCNC: 0.6 MG/DL
BUN SERPL-MCNC: 18.5 MG/DL (ref 8–23)
CALCIUM SERPL-MCNC: 9.1 MG/DL (ref 8.8–10.2)
CHLORIDE SERPL-SCNC: 99 MMOL/L (ref 98–107)
CREAT SERPL-MCNC: 0.85 MG/DL (ref 0.67–1.17)
DEPRECATED HCO3 PLAS-SCNC: 25 MMOL/L (ref 22–29)
EOSINOPHIL # BLD AUTO: 0.2 10E3/UL (ref 0–0.7)
EOSINOPHIL NFR BLD AUTO: 1 %
ERYTHROCYTE [DISTWIDTH] IN BLOOD BY AUTOMATED COUNT: 11.8 % (ref 10–15)
GFR SERPL CREATININE-BSD FRML MDRD: >90 ML/MIN/1.73M2
GLUCOSE SERPL-MCNC: 103 MG/DL (ref 70–99)
HCT VFR BLD AUTO: 48.2 % (ref 40–53)
HGB BLD-MCNC: 16.8 G/DL (ref 13.3–17.7)
IMM GRANULOCYTES # BLD: 0.2 10E3/UL
IMM GRANULOCYTES NFR BLD: 1 %
LYMPHOCYTES # BLD AUTO: 1.9 10E3/UL (ref 0.8–5.3)
LYMPHOCYTES NFR BLD AUTO: 17 %
MAGNESIUM SERPL-MCNC: 2.1 MG/DL (ref 1.7–2.3)
MCH RBC QN AUTO: 30.5 PG (ref 26.5–33)
MCHC RBC AUTO-ENTMCNC: 34.9 G/DL (ref 31.5–36.5)
MCV RBC AUTO: 88 FL (ref 78–100)
MONOCYTES # BLD AUTO: 1 10E3/UL (ref 0–1.3)
MONOCYTES NFR BLD AUTO: 9 %
NEUTROPHILS # BLD AUTO: 8 10E3/UL (ref 1.6–8.3)
NEUTROPHILS NFR BLD AUTO: 72 %
NRBC # BLD AUTO: 0 10E3/UL
NRBC BLD AUTO-RTO: 0 /100
PLATELET # BLD AUTO: 268 10E3/UL (ref 150–450)
POTASSIUM SERPL-SCNC: 4.4 MMOL/L (ref 3.4–5.3)
PROT SERPL-MCNC: 6.9 G/DL (ref 6.4–8.3)
RAD ONC ARIA COURSE ID: NORMAL
RAD ONC ARIA COURSE LAST TREATMENT DATE: NORMAL
RAD ONC ARIA COURSE START DATE: NORMAL
RAD ONC ARIA COURSE TREATMENT ELAPSED DAYS: 7
RAD ONC ARIA FIRST TREATMENT DATE: NORMAL
RAD ONC ARIA PLAN FRACTIONS TREATED TO DATE: 6
RAD ONC ARIA PLAN ID: NORMAL
RAD ONC ARIA PLAN PRESCRIBED DOSE PER FRACTION: 2 GY
RAD ONC ARIA PLAN TOTAL FRACTIONS PRESCRIBED: 35
RAD ONC ARIA PLAN TOTAL PRESCRIBED DOSE: 7000 CGY
RAD ONC ARIA REFERENCE POINT DOSAGE GIVEN TO DATE: NORMAL GY
RAD ONC ARIA REFERENCE POINT DOSAGE GIVEN TO DATE: NORMAL GY
RAD ONC ARIA REFERENCE POINT ID: NORMAL
RAD ONC ARIA REFERENCE POINT ID: NORMAL
RBC # BLD AUTO: 5.5 10E6/UL (ref 4.4–5.9)
SODIUM SERPL-SCNC: 134 MMOL/L (ref 136–145)
WBC # BLD AUTO: 11.2 10E3/UL (ref 4–11)

## 2022-12-06 PROCEDURE — 85025 COMPLETE CBC W/AUTO DIFF WBC: CPT | Performed by: NURSE PRACTITIONER

## 2022-12-06 PROCEDURE — 96375 TX/PRO/DX INJ NEW DRUG ADDON: CPT | Performed by: INTERNAL MEDICINE

## 2022-12-06 PROCEDURE — 96413 CHEMO IV INFUSION 1 HR: CPT | Performed by: INTERNAL MEDICINE

## 2022-12-06 PROCEDURE — 36591 DRAW BLOOD OFF VENOUS DEVICE: CPT | Performed by: NURSE PRACTITIONER

## 2022-12-06 PROCEDURE — 83735 ASSAY OF MAGNESIUM: CPT | Performed by: NURSE PRACTITIONER

## 2022-12-06 PROCEDURE — 96367 TX/PROPH/DG ADDL SEQ IV INF: CPT | Performed by: INTERNAL MEDICINE

## 2022-12-06 PROCEDURE — 99214 OFFICE O/P EST MOD 30 MIN: CPT | Performed by: NURSE PRACTITIONER

## 2022-12-06 PROCEDURE — 80053 COMPREHEN METABOLIC PANEL: CPT | Performed by: NURSE PRACTITIONER

## 2022-12-06 RX ORDER — ALBUTEROL SULFATE 90 UG/1
1-2 AEROSOL, METERED RESPIRATORY (INHALATION)
Status: CANCELLED
Start: 2022-12-13

## 2022-12-06 RX ORDER — HEPARIN SODIUM (PORCINE) LOCK FLUSH IV SOLN 100 UNIT/ML 100 UNIT/ML
5 SOLUTION INTRAVENOUS
Status: CANCELLED | OUTPATIENT
Start: 2022-12-06

## 2022-12-06 RX ORDER — HEPARIN SODIUM,PORCINE 10 UNIT/ML
5 VIAL (ML) INTRAVENOUS
Status: CANCELLED | OUTPATIENT
Start: 2022-12-13

## 2022-12-06 RX ORDER — METHYLPREDNISOLONE SODIUM SUCCINATE 125 MG/2ML
125 INJECTION, POWDER, LYOPHILIZED, FOR SOLUTION INTRAMUSCULAR; INTRAVENOUS
Status: CANCELLED
Start: 2022-12-13

## 2022-12-06 RX ORDER — DIPHENHYDRAMINE HYDROCHLORIDE 50 MG/ML
50 INJECTION INTRAMUSCULAR; INTRAVENOUS
Status: CANCELLED
Start: 2022-12-06

## 2022-12-06 RX ORDER — EPINEPHRINE 1 MG/ML
0.3 INJECTION, SOLUTION, CONCENTRATE INTRAVENOUS EVERY 5 MIN PRN
Status: CANCELLED | OUTPATIENT
Start: 2022-12-13

## 2022-12-06 RX ORDER — MAGNESIUM OXIDE 400 MG/1
400 TABLET ORAL DAILY
COMMUNITY

## 2022-12-06 RX ORDER — LORAZEPAM 2 MG/ML
0.5 INJECTION INTRAMUSCULAR EVERY 4 HOURS PRN
Status: CANCELLED | OUTPATIENT
Start: 2022-12-13

## 2022-12-06 RX ORDER — DIPHENHYDRAMINE HYDROCHLORIDE 50 MG/ML
50 INJECTION INTRAMUSCULAR; INTRAVENOUS
Status: CANCELLED
Start: 2022-12-13

## 2022-12-06 RX ORDER — ALBUTEROL SULFATE 90 UG/1
1-2 AEROSOL, METERED RESPIRATORY (INHALATION)
Status: CANCELLED
Start: 2022-12-06

## 2022-12-06 RX ORDER — PALONOSETRON 0.05 MG/ML
0.25 INJECTION, SOLUTION INTRAVENOUS ONCE
Status: COMPLETED | OUTPATIENT
Start: 2022-12-06 | End: 2022-12-06

## 2022-12-06 RX ORDER — MEPERIDINE HYDROCHLORIDE 25 MG/ML
25 INJECTION INTRAMUSCULAR; INTRAVENOUS; SUBCUTANEOUS EVERY 30 MIN PRN
Status: CANCELLED | OUTPATIENT
Start: 2022-12-06

## 2022-12-06 RX ORDER — ALBUTEROL SULFATE 0.83 MG/ML
2.5 SOLUTION RESPIRATORY (INHALATION)
Status: CANCELLED | OUTPATIENT
Start: 2022-12-06

## 2022-12-06 RX ORDER — HEPARIN SODIUM (PORCINE) LOCK FLUSH IV SOLN 100 UNIT/ML 100 UNIT/ML
5 SOLUTION INTRAVENOUS
Status: DISCONTINUED | OUTPATIENT
Start: 2022-12-06 | End: 2022-12-06 | Stop reason: HOSPADM

## 2022-12-06 RX ORDER — ZINC GLUCONATE 50 MG
50 TABLET ORAL DAILY
COMMUNITY

## 2022-12-06 RX ORDER — MEPERIDINE HYDROCHLORIDE 25 MG/ML
25 INJECTION INTRAMUSCULAR; INTRAVENOUS; SUBCUTANEOUS EVERY 30 MIN PRN
Status: CANCELLED | OUTPATIENT
Start: 2022-12-13

## 2022-12-06 RX ORDER — LORAZEPAM 2 MG/ML
0.5 INJECTION INTRAMUSCULAR EVERY 4 HOURS PRN
Status: CANCELLED | OUTPATIENT
Start: 2022-12-06

## 2022-12-06 RX ORDER — METHYLPREDNISOLONE SODIUM SUCCINATE 125 MG/2ML
125 INJECTION, POWDER, LYOPHILIZED, FOR SOLUTION INTRAMUSCULAR; INTRAVENOUS
Status: CANCELLED
Start: 2022-12-06

## 2022-12-06 RX ORDER — PALONOSETRON 0.05 MG/ML
0.25 INJECTION, SOLUTION INTRAVENOUS ONCE
Status: CANCELLED | OUTPATIENT
Start: 2022-12-13

## 2022-12-06 RX ORDER — HEPARIN SODIUM (PORCINE) LOCK FLUSH IV SOLN 100 UNIT/ML 100 UNIT/ML
5 SOLUTION INTRAVENOUS
Status: CANCELLED | OUTPATIENT
Start: 2022-12-13

## 2022-12-06 RX ORDER — ALBUTEROL SULFATE 0.83 MG/ML
2.5 SOLUTION RESPIRATORY (INHALATION)
Status: CANCELLED | OUTPATIENT
Start: 2022-12-13

## 2022-12-06 RX ORDER — HEPARIN SODIUM,PORCINE 10 UNIT/ML
5 VIAL (ML) INTRAVENOUS
Status: CANCELLED | OUTPATIENT
Start: 2022-12-06

## 2022-12-06 RX ORDER — EPINEPHRINE 1 MG/ML
0.3 INJECTION, SOLUTION, CONCENTRATE INTRAVENOUS EVERY 5 MIN PRN
Status: CANCELLED | OUTPATIENT
Start: 2022-12-06

## 2022-12-06 RX ADMIN — HEPARIN SODIUM (PORCINE) LOCK FLUSH IV SOLN 100 UNIT/ML 5 ML: 100 SOLUTION at 13:59

## 2022-12-06 RX ADMIN — PALONOSETRON 0.25 MG: 0.05 INJECTION, SOLUTION INTRAVENOUS at 12:03

## 2022-12-06 RX ADMIN — Medication 1000 ML: at 12:28

## 2022-12-06 ASSESSMENT — PAIN SCALES - GENERAL
PAINLEVEL: NO PAIN (0)
PAINLEVEL: NO PAIN (1)

## 2022-12-06 NOTE — NURSING NOTE
"Oncology Rooming Note    December 6, 2022 9:58 AM   Melchor Sadler is a 62 year old male who presents for:    Chief Complaint   Patient presents with     Oncology Clinic Visit     Follow up. Malignant neoplasm of tonsil      Initial Vitals: /74   Pulse 66   Temp 98.8  F (37.1  C) (Tympanic)   Resp 18   Ht 1.753 m (5' 9\")   Wt 109.5 kg (241 lb 6.5 oz)   SpO2 95%   BMI 35.65 kg/m   Estimated body mass index is 35.65 kg/m  as calculated from the following:    Height as of this encounter: 1.753 m (5' 9\").    Weight as of this encounter: 109.5 kg (241 lb 6.5 oz). Body surface area is 2.31 meters squared.  No Pain (0) Comment: Data Unavailable   No LMP for male patient.  Allergies reviewed: Yes  Medications reviewed: Yes    Medications: Medication refills not needed today.  Pharmacy name entered into [employer]: [facility] [address]              "

## 2022-12-06 NOTE — PROGRESS NOTES
Patient is 62 years old, here today for infusion of cisplatin under the orders of Dr Griffin.      Power port accessed with 19 gauge 3/4 inch non-coring needle.  Line flushed with 10 cc's normal saline.  Needle secured with sterile transparent dressing.  10 cc's blood discarded, and blood taken for ordered labs.  Patient tolerated well.  Denies pain and discomfort at this time.  Port flushes easily without resistance.    Hand hygiene performed: yes   Mask donned by caregiver: yes Site prepped with CHG: yes Labs drawn: yes Dressing applied using aseptic technique: yes     Lab values: Lab values are with in normal limits.      Patient meets parameters for today's infusion.  Denies questions or concerns regarding today's infusion and/or medications being administered.      Patient identified with two identifiers, order verified, and verbal consent for today's infusion obtained from patient.     1248  IV pump verified with  dose, drug, and rate of administration. Infusion administered per protocol. Patient tolerated infusion well, no signs or symptoms of adverse reaction noted. Patient denies pain nor discomfort.     Needle removed, tip intact. Site clean, dry and intact. Covered with a sterile bandage, slight pressure applied for 30 seconds. Pt instructed to leave bandage intact for a minimum of one hour, and to call with questions or concerns. Patient states understanding, discharged.

## 2022-12-06 NOTE — PROGRESS NOTES
Patients power port accessed using non-coring, 19 gauge, 3/4 inch needle.    Mask donned by caregiver: yes Site prepped with CHG: yes Labs drawn: yes Dressing applied using aseptic technique: yes.     Port accessed per facility protocol. Port flushed easily, blood return noted.  No signs and symptoms of infection or infiltration.  Port flushed with 10mL normal saline, blood return noted, 10 mLs blood discarded. Blood taken for ordered labs, port flushed with 20mL normal saline.  Port left accessed as patient has appointment with 2, and is then due for chemo if parameters are met.  Patient discharged with no complaints.

## 2022-12-06 NOTE — PROGRESS NOTES
Oncology Treatment Followup    Reason for Visit:  Melchor is a 62 year old gentleman with a diagnosis of tonsillar cancer who presents today for consideration of ongoing chemotherapy. He is due for D8 Cisplatin, given concurrently with XRT.     Nursing Note and documentation reviewed: Yes    HPI:    Patient reports that he is really doing quite well, tolerating therapy with modest side effects. No signs of infection. No fever, chills, chest pain, cough, or SOB. No bleeding concerns. He denies nausea or vomiting. Appetite is good. Intake is good. No issues or concerns with swallowing Drinking about 80 oz hydrating fluid per day with no issues. He did have a bit of constipation with some abdominal discomfort. He used prune juice and that helped. No skin concerns. Some redness at XRT site but no breakdown or pain. He does get a little tired after XRT but has stayed quite active and busy. Working 8 hours per day most days.     Oncologic History:   05/2021 First noticed left neck mass  08/16/2022 Seen by PCP, with complaints of left growing neck mass. CT ordered.   08/23/2022 CT soft tissue neck mass: 3.8 cm probable everardo mass in the left jugulodigastric chain. Adjacent also enlarged 2.3 cm long axis node. Differential considerations favor  metastatic disease. A mucosal mass is questioned at the roof of the left tonsillar pillar.  09/13/2022 Evaluated by Dr. Houston at [facility] who recommended perry endoscopy with biopsy of the left tonsil  09/22/2022 Underwent Panendoscopy with path showing poorly differentiated squamous carcinoma, p16 positive.   10/04/2022 Seen again by Dr. Houston who recommended PET scan and referral to radiation oncology and medical oncology for discussion regarding concurrent chemoRT.   10/26/2022 Met in consultation with Dr. Restrepo of [facility], who at the time, states his radiotherapy plan would be 70 Gy delivered in 35 fractions of 2 Gy each. Ipsilateral vs bilateral neck coverage is TBD by results  of PET CT.   11/2/2022 PET scan IMPRESSION: Large left anterior cervical chain lymph node metastasis measuring 4.0 x 3.5 cm in dimension. There are a few mildly enlarged adjacent left anterior cervical chain lymph nodes with uptake not significantly greater than that of background uptake. No evidence of distant metastatic disease otherwise.  11/03/2022 Met in consultation with Dr. Pruitt of [facility] who agreed with the the plan of concurrent chemorads, utilizing Cisplatin 40 mg weekly.   11/11/2022 Planned port placement  11/16/2022 Planned Rad Onc simulation  11/28/2022 Commenced XRT treatments  11/29/2022 C1 Cisplatin    Treatment Goal: Curative    Planned TX: Weekly Cisplatin 40 mg/m2 given concurrently with radiation therapy  Current Cycle: C1D8  Completed cycles: C1D1      Previous treatment:  N/A    Past Medical History:   Diagnosis Date     Malignant neoplasm of tonsil (H) 10/04/2022    Per [facility] Chart     Oropharyngeal cancer (H) 09/13/2022    Per [facility]     GLENDA (obstructive sleep apnea) 09/22/2022    Per [facility] chart       Past Surgical History:   Procedure Laterality Date     INSERT PORT VASCULAR ACCESS N/A 11/11/2022    Procedure: Port-a-cath placement;  Surgeon: Teodoro Avila MD;  Location: [facility] OR     Panendoscopy, biopsy left palate  09/22/2022    [facility]-Dr. Jamey Houston       Family History   Problem Relation Age of Onset     Lung Cancer Mother      Cancer Mother      Diabetes Father      Colon Cancer Maternal Grandmother      Hyperlipidemia No family hx of      Coronary Artery Disease No family hx of      Hypertension No family hx of      Breast Cancer No family hx of      Prostate Cancer No family hx of      Depression No family hx of      Anxiety Disorder No family hx of      Mental Illness No family hx of      Anesthesia Reaction No family hx of      Asthma No family hx of        Social History     Socioeconomic History     Marital status:      Spouse name: Sandra Acuna  "of children: 2     Years of education: 16     Highest education level: Not on file   Occupational History     Employer: [employer]     Occupation: [employer]   Tobacco Use     Smoking status: Never     Smokeless tobacco: Former     Types: Chew     Quit date: 1995   Vaping Use     Vaping Use: Never used   Substance and Sexual Activity     Alcohol use: Not Currently     Drug use: No     Comment: Drug use: No     Sexual activity: Yes     Partners: Female   Other Topics Concern     Parent/sibling w/ CABG, MI or angioplasty before 65F 55M? Not Asked   Social History Narrative     Not on file     Social Determinants of Health     Financial Resource Strain: Not on file   Food Insecurity: Not on file   Transportation Needs: Not on file   Physical Activity: Not on file   Stress: Not on file   Social Connections: Not on file   Intimate Partner Violence: Not on file   Housing Stability: Not on file       Current Outpatient Medications   Medication     dexamethasone (DECADRON) 4 MG tablet     NONFORMULARY     ondansetron (ZOFRAN) 8 MG tablet     prochlorperazine (COMPAZINE) 10 MG tablet     No current facility-administered medications for this visit.     Facility-Administered Medications Ordered in Other Visits   Medication     heparin 100 UNIT/ML injection 5 mL     sodium chloride (PF) 0.9% PF flush 3-20 mL        Allergies   Allergen Reactions     Codeine Other (See Comments)     Anxiety     Penicillins Anxiety     Pt states his sister and father are allergic, but he doesn't believe he is. 11/11/2022       Review Of Systems:  A complete review of systems is negative except for the above mentioned items in the interval history.     Physical Exam:  /74   Pulse 66   Temp 98.8  F (37.1  C) (Tympanic)   Resp 18   Ht 1.753 m (5' 9\")   Wt 109.5 kg (241 lb 6.5 oz)   SpO2 95%   BMI 35.65 kg/m    GENERAL APPEARANCE: Healthy, alert and in no acute distress.  HEENT: Eyes appear normal without scleral icterus. Extraocular movements " intact.  NECK:   Supple with normal range of motion. Errythema or radiation field.   LYMPHATICS: Approximately 6 x 4.5 cm left cervical node.   RESP: Lungs clear to auscultation bilaterally, respirations regular and easy.  CARDIOVASCULAR: Regular rate and rhythm. Normal S1, S2; no murmur, gallop, or rub.  ABDOMEN: Soft, non-tender, non-distended. Bowel sounds auscultated all 4 quadrants. No palpable organomegaly or masses.  MUSCULOSKELETAL: Extremities without gross deformities noted. No edema of bilateral lower extremities.  SKIN: No suspicious lesions or rashes.  NEURO: Alert and oriented x 3.  Gait steady.  PSYCHIATRIC: Mentation and affect appear normal.  Mood appropriate.    Laboratory:  Results for orders placed or performed in visit on 12/06/22   Comprehensive metabolic panel     Status: Abnormal   Result Value Ref Range    Sodium 134 (L) 136 - 145 mmol/L    Potassium 4.4 3.4 - 5.3 mmol/L    Chloride 99 98 - 107 mmol/L    Carbon Dioxide (CO2) 25 22 - 29 mmol/L    Anion Gap 10 7 - 15 mmol/L    Urea Nitrogen 18.5 8.0 - 23.0 mg/dL    Creatinine 0.85 0.67 - 1.17 mg/dL    Calcium 9.1 8.8 - 10.2 mg/dL    Glucose 103 (H) 70 - 99 mg/dL    Alkaline Phosphatase 68 40 - 129 U/L    AST 23 10 - 50 U/L    ALT 34 10 - 50 U/L    Protein Total 6.9 6.4 - 8.3 g/dL    Albumin 3.8 3.5 - 5.2 g/dL    Bilirubin Total 0.6 <=1.2 mg/dL    GFR Estimate >90 >60 mL/min/1.73m2   Magnesium     Status: Normal   Result Value Ref Range    Magnesium 2.1 1.7 - 2.3 mg/dL   CBC with platelets and differential     Status: Abnormal   Result Value Ref Range    WBC Count 11.2 (H) 4.0 - 11.0 10e3/uL    RBC Count 5.50 4.40 - 5.90 10e6/uL    Hemoglobin 16.8 13.3 - 17.7 g/dL    Hematocrit 48.2 40.0 - 53.0 %    MCV 88 78 - 100 fL    MCH 30.5 26.5 - 33.0 pg    MCHC 34.9 31.5 - 36.5 g/dL    RDW 11.8 10.0 - 15.0 %    Platelet Count 268 150 - 450 10e3/uL    % Neutrophils 72 %    % Lymphocytes 17 %    % Monocytes 9 %    % Eosinophils 1 %    % Basophils 0 %    %  Immature Granulocytes 1 %    NRBCs per 100 WBC 0 <1 /100    Absolute Neutrophils 8.0 1.6 - 8.3 10e3/uL    Absolute Lymphocytes 1.9 0.8 - 5.3 10e3/uL    Absolute Monocytes 1.0 0.0 - 1.3 10e3/uL    Absolute Eosinophils 0.2 0.0 - 0.7 10e3/uL    Absolute Basophils 0.0 0.0 - 0.2 10e3/uL    Absolute Immature Granulocytes 0.2 <=0.4 10e3/uL    Absolute NRBCs 0.0 10e3/uL   CBC with platelets differential     Status: Abnormal    Narrative    The following orders were created for panel order CBC with platelets differential.  Procedure                               Abnormality         Status                     ---------                               -----------         ------                     CBC with platelets and d...[[id number]]  Abnormal            Final result                 Please view results for these tests on the individual orders.   Results for orders placed or performed in visit on 12/06/22   Rad Onc Aria Session Summary     Status: None   Result Value Ref Range    Course ID C1     Course Start Date 11/7/2022     Course First Treatment Date 11/29/2022     Course Last Treatment Date 12/6/2022     Course Elapsed Days 7     Reference Point ID Iso Tonsil     Reference Point Dosage Given to Date (Gy) 12.52189941  [id number]   Gy    Reference Point ID RX PTV Tonsil lt     Reference Point Dosage Given to Date (Gy) 12  2   Gy    Plan ID Lt Tonsil     Plan Fractions Treated to Date 6     Plan Total Fractions Prescribed 35     Plan Prescribed Dose Per Fraction (Gy) 2 Gy    Plan Total Prescribed Dose (cGy) 7,000 cGy       Imaging Studies:    None this visit    ASSESSMENT/PLAN:    1. 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 Undergoing concurrent chemorads with utilization of Cisplatin. Tolerating without particular concerns. I see no contraindications to proceed with D8 Cisplatin with no changes. For now, will continue utilization of Dex following chemo. If he does well in  regards to nausea this cycle, we could eliminate dex. Will see how this week goes. I will see him back in 2 weeks prior to C22 chemo, that said, I am in office next week so that if he is having concerns, nursing can contact me. Patient has been good about fluid intake. Trying to remain in work. For now, we will hold on IV hydration but I did let patient know that we may want to utilize IV hydration if he becomes more symptomatic from radiation treatments. For now, push oral fluids.     2. Constipation Prune juice helped. Discussed utilization of Senna-S. Patient prefers Miralax. That is fine. If prune juice stops working, will use Miralax PRN so he had BM no less than every 2-3 days.     I encouraged patient to call with any questions or concerns.    35 minutes spent in the patient's encounter today with time spent in review of the chart along with in chart preparation.  Time was spent in review of the treamtent plan.  Time was also spent obtaining a review of systems and performing a physical exam.  Time was spent in review of all planned medications for treatment with the patient and questions answered.     СЕРГЕЙ Garber [facility]  Medical Oncology

## 2022-12-07 ENCOUNTER — RESULTS ONLY (OUTPATIENT)
Dept: RADIATION ONCOLOGY | Facility: HOSPITAL | Age: 62
End: 2022-12-07

## 2022-12-07 ENCOUNTER — APPOINTMENT (OUTPATIENT)
Dept: RADIATION ONCOLOGY | Facility: HOSPITAL | Age: 62
End: 2022-12-07
Payer: COMMERCIAL

## 2022-12-07 DIAGNOSIS — Z51.11 ENCOUNTER FOR ANTINEOPLASTIC CHEMOTHERAPY: ICD-10-CM

## 2022-12-07 LAB
RAD ONC ARIA COURSE ID: NORMAL
RAD ONC ARIA COURSE LAST TREATMENT DATE: NORMAL
RAD ONC ARIA COURSE START DATE: NORMAL
RAD ONC ARIA COURSE TREATMENT ELAPSED DAYS: 8
RAD ONC ARIA FIRST TREATMENT DATE: NORMAL
RAD ONC ARIA PLAN FRACTIONS TREATED TO DATE: 7
RAD ONC ARIA PLAN ID: NORMAL
RAD ONC ARIA PLAN PRESCRIBED DOSE PER FRACTION: 2 GY
RAD ONC ARIA PLAN TOTAL FRACTIONS PRESCRIBED: 35
RAD ONC ARIA PLAN TOTAL PRESCRIBED DOSE: 7000 CGY
RAD ONC ARIA REFERENCE POINT DOSAGE GIVEN TO DATE: NORMAL GY
RAD ONC ARIA REFERENCE POINT DOSAGE GIVEN TO DATE: NORMAL GY
RAD ONC ARIA REFERENCE POINT ID: NORMAL
RAD ONC ARIA REFERENCE POINT ID: NORMAL

## 2022-12-07 PROCEDURE — 77386 HC IMRT TREATMENT DELIVERY, COMPLEX: CPT | Performed by: STUDENT IN AN ORGANIZED HEALTH CARE EDUCATION/TRAINING PROGRAM

## 2022-12-07 PROCEDURE — 77014 PR CT GUIDE FOR PLACEMENT RADIATION THERAPY FIELDS: CPT | Mod: 26 | Performed by: STUDENT IN AN ORGANIZED HEALTH CARE EDUCATION/TRAINING PROGRAM

## 2022-12-07 PROCEDURE — 77014 HC CT GUIDE FOR PLACEMENT RADIATION THERAPY FIELDS: CPT | Performed by: STUDENT IN AN ORGANIZED HEALTH CARE EDUCATION/TRAINING PROGRAM

## 2022-12-08 ENCOUNTER — RESULTS ONLY (OUTPATIENT)
Dept: RADIATION ONCOLOGY | Facility: HOSPITAL | Age: 62
End: 2022-12-08

## 2022-12-08 ENCOUNTER — APPOINTMENT (OUTPATIENT)
Dept: RADIATION ONCOLOGY | Facility: HOSPITAL | Age: 62
End: 2022-12-08
Payer: COMMERCIAL

## 2022-12-08 LAB
RAD ONC ARIA COURSE ID: NORMAL
RAD ONC ARIA COURSE LAST TREATMENT DATE: NORMAL
RAD ONC ARIA COURSE START DATE: NORMAL
RAD ONC ARIA COURSE TREATMENT ELAPSED DAYS: 9
RAD ONC ARIA FIRST TREATMENT DATE: NORMAL
RAD ONC ARIA PLAN FRACTIONS TREATED TO DATE: 8
RAD ONC ARIA PLAN ID: NORMAL
RAD ONC ARIA PLAN PRESCRIBED DOSE PER FRACTION: 2 GY
RAD ONC ARIA PLAN TOTAL FRACTIONS PRESCRIBED: 35
RAD ONC ARIA PLAN TOTAL PRESCRIBED DOSE: 7000 CGY
RAD ONC ARIA REFERENCE POINT DOSAGE GIVEN TO DATE: NORMAL GY
RAD ONC ARIA REFERENCE POINT DOSAGE GIVEN TO DATE: NORMAL GY
RAD ONC ARIA REFERENCE POINT ID: NORMAL
RAD ONC ARIA REFERENCE POINT ID: NORMAL

## 2022-12-08 PROCEDURE — 77014 HC CT GUIDE FOR PLACEMENT RADIATION THERAPY FIELDS: CPT | Performed by: STUDENT IN AN ORGANIZED HEALTH CARE EDUCATION/TRAINING PROGRAM

## 2022-12-08 PROCEDURE — 77386 HC IMRT TREATMENT DELIVERY, COMPLEX: CPT | Performed by: STUDENT IN AN ORGANIZED HEALTH CARE EDUCATION/TRAINING PROGRAM

## 2022-12-08 PROCEDURE — 77014 PR CT GUIDE FOR PLACEMENT RADIATION THERAPY FIELDS: CPT | Mod: 26 | Performed by: STUDENT IN AN ORGANIZED HEALTH CARE EDUCATION/TRAINING PROGRAM

## 2022-12-09 ENCOUNTER — RESULTS ONLY (OUTPATIENT)
Dept: RADIATION ONCOLOGY | Facility: HOSPITAL | Age: 62
End: 2022-12-09

## 2022-12-09 ENCOUNTER — APPOINTMENT (OUTPATIENT)
Dept: RADIATION ONCOLOGY | Facility: HOSPITAL | Age: 62
End: 2022-12-09
Payer: COMMERCIAL

## 2022-12-09 LAB
RAD ONC ARIA COURSE ID: NORMAL
RAD ONC ARIA COURSE LAST TREATMENT DATE: NORMAL
RAD ONC ARIA COURSE START DATE: NORMAL
RAD ONC ARIA COURSE TREATMENT ELAPSED DAYS: 10
RAD ONC ARIA FIRST TREATMENT DATE: NORMAL
RAD ONC ARIA PLAN FRACTIONS TREATED TO DATE: 9
RAD ONC ARIA PLAN ID: NORMAL
RAD ONC ARIA PLAN PRESCRIBED DOSE PER FRACTION: 2 GY
RAD ONC ARIA PLAN TOTAL FRACTIONS PRESCRIBED: 35
RAD ONC ARIA PLAN TOTAL PRESCRIBED DOSE: 7000 CGY
RAD ONC ARIA REFERENCE POINT DOSAGE GIVEN TO DATE: NORMAL GY
RAD ONC ARIA REFERENCE POINT DOSAGE GIVEN TO DATE: NORMAL GY
RAD ONC ARIA REFERENCE POINT ID: NORMAL
RAD ONC ARIA REFERENCE POINT ID: NORMAL

## 2022-12-09 PROCEDURE — 77014 PR CT GUIDE FOR PLACEMENT RADIATION THERAPY FIELDS: CPT | Mod: 26 | Performed by: STUDENT IN AN ORGANIZED HEALTH CARE EDUCATION/TRAINING PROGRAM

## 2022-12-09 PROCEDURE — 77014 HC CT GUIDE FOR PLACEMENT RADIATION THERAPY FIELDS: CPT | Performed by: STUDENT IN AN ORGANIZED HEALTH CARE EDUCATION/TRAINING PROGRAM

## 2022-12-09 PROCEDURE — 77386 HC IMRT TREATMENT DELIVERY, COMPLEX: CPT | Performed by: STUDENT IN AN ORGANIZED HEALTH CARE EDUCATION/TRAINING PROGRAM

## 2022-12-12 ENCOUNTER — DOCUMENTATION ONLY (OUTPATIENT)
Dept: OTHER | Facility: CLINIC | Age: 62
End: 2022-12-12

## 2022-12-12 ENCOUNTER — OFFICE VISIT (OUTPATIENT)
Dept: RADIATION ONCOLOGY | Facility: HOSPITAL | Age: 62
End: 2022-12-12

## 2022-12-12 ENCOUNTER — RESULTS ONLY (OUTPATIENT)
Dept: RADIATION ONCOLOGY | Facility: HOSPITAL | Age: 62
End: 2022-12-12

## 2022-12-12 VITALS — TEMPERATURE: 100.5 F

## 2022-12-12 DIAGNOSIS — C09.9 MALIGNANT NEOPLASM OF TONSIL (H): Primary | ICD-10-CM

## 2022-12-12 LAB
RAD ONC ARIA COURSE ID: NORMAL
RAD ONC ARIA COURSE LAST TREATMENT DATE: NORMAL
RAD ONC ARIA COURSE START DATE: NORMAL
RAD ONC ARIA COURSE TREATMENT ELAPSED DAYS: 13
RAD ONC ARIA FIRST TREATMENT DATE: NORMAL
RAD ONC ARIA PLAN FRACTIONS TREATED TO DATE: 10
RAD ONC ARIA PLAN ID: NORMAL
RAD ONC ARIA PLAN PRESCRIBED DOSE PER FRACTION: 2 GY
RAD ONC ARIA PLAN TOTAL FRACTIONS PRESCRIBED: 35
RAD ONC ARIA PLAN TOTAL PRESCRIBED DOSE: 7000 CGY
RAD ONC ARIA REFERENCE POINT DOSAGE GIVEN TO DATE: NORMAL GY
RAD ONC ARIA REFERENCE POINT DOSAGE GIVEN TO DATE: NORMAL GY
RAD ONC ARIA REFERENCE POINT ID: NORMAL
RAD ONC ARIA REFERENCE POINT ID: NORMAL

## 2022-12-12 RX ORDER — NYSTATIN 100000/ML
500000 SUSPENSION, ORAL (FINAL DOSE FORM) ORAL 4 TIMES DAILY
Qty: 200 ML | Refills: 0 | Status: SHIPPED | OUTPATIENT
Start: 2022-12-12 | End: 2022-12-22

## 2022-12-12 ASSESSMENT — PAIN SCALES - GENERAL: PAINLEVEL: MODERATE PAIN (4)

## 2022-12-12 NOTE — PROGRESS NOTES
Progress Notes  Encounter Date: Dec 12, 2022  СЕРГЕЙ Méndez CNP     RADIATION ONCOLOGY WEEKLY MANAGEMENT PROGRESS NOTE     Patient Care Team       Relationship Specialty Notifications Start End    No Ref-Primary, Physician PCP - General   11/14/22     Fax: [phone]         Maryam Cannon MD Assigned PCP   9/24/22     Phone: [phone] Fax: [phone] [address] [place] [address]    Vicki Alaniz MD Assigned Cancer Care Provider   11/26/22     Phone: [phone] Fax: [phone]         [address] [place] [address]                  DIAGNOSIS:  Cancer Staging   Malignant neoplasm of tonsil (H)  Staging form: Pharynx - Oropharynx, AJCC 8th Edition  - Clinical stage from 11/16/2022: Stage I (cT1, cN1, cM0, p16+) - Signed by Vicki Alaniz MD on 11/16/2022          RADIATION THERAPY:    Melchor Sadler has received 2000 cGy to date to the left tonsil.       SUBJECTIVE:    Currently He  Reports increased pain in his mouth, throat, and with swallowing. In addition he states noticing a white plaque coating his tongue.   He presents with a low grade temp.       OBJECTIVE:     Examination reveals alert male patient. Oropharynx, tongue, and buccal cavities erythematous with a white plaque present.         IMPRESSION:  Routine tolerance to radiation therapy.       PLAN:  Continue treatment as planned. Start Nystatin for oral thrush (swish and swallow) infection 4 times daily for 10 days. Start magic mouthwash as needed/ prior to meals to help with pain and throat discomfort. Educated for patient to seek medical care should he have new or worsening symptoms. Patient states understanding and denies further questions or concerns at this time.        Medical record and imaging reviewed.      СЕРГЕЙ Méndez CNP

## 2022-12-12 NOTE — PROGRESS NOTES
Progress Notes  Encounter Date: Dec 13, 2022  СЕРГЕЙ Méndez CNP     RADIATION ONCOLOGY WEEKLY MANAGEMENT PROGRESS NOTE     Patient Care Team       Relationship Specialty Notifications Start End    No Ref-Primary, Physician PCP - General   11/14/22     Fax: [phone]         Maryam Cannon MD Assigned PCP   9/24/22     Phone: [phone] Fax: [phone] [address] [place] [address]    Vicki Alaniz MD Assigned Cancer Care Provider   11/26/22     Phone: [phone] Fax: [phone]         [address] [place] [address]                  DIAGNOSIS:  Cancer Staging   Malignant neoplasm of tonsil (H)  Staging form: Pharynx - Oropharynx, AJCC 8th Edition  - Clinical stage from 11/16/2022: Stage I (cT1, cN1, cM0, p16+) - Signed by Vicki Alaniz MD on 11/16/2022        RADIATION THERAPY:    Melchor Sadler has received 2200 cGy to date to left tonsil.  Treatment 11/35  Total planned dose 7000 cGy   Systemic Therapy: Weekly cisplatin         SUBJECTIVE:  Patient states that he is still not feeling well, he does have a temp, congestion, cough.  States his throat is sore and the cough is making his throat more sore.  He continues to have      OBJECTIVE:    WEIGHT: 237 lbs 8 oz.  Examination reveals alert non ill appearing male patient, respirations easy non labored.  No mucositis, small left slightly ulcerated region to the left posterior oropharynx (tonsillar fossa). ***  6 x 4.5 cm lymph node present to left side of neck. ***      IMPRESSION:  Routine tolerance to radiation therapy.       PLAN:  Continue treatment as planned. Going to try a humidifier at night to see if it helps the dry feeling in his throat. ***                Treatment plan reviewed.  Contralateral neck not being treated. ACR Appropriatness Criteria allow for treating Ipsi neck-only for T0-T1 (up to 6 cm  For p16+) disease.       Medical record and imaging reviewed.      СЕРГЕЙ Méndez CNP   0

## 2022-12-13 ENCOUNTER — DOCUMENTATION ONLY (OUTPATIENT)
Dept: HOME HEALTH SERVICES | Facility: CLINIC | Age: 62
End: 2022-12-13

## 2022-12-13 ENCOUNTER — OFFICE VISIT (OUTPATIENT)
Dept: RADIATION ONCOLOGY | Facility: HOSPITAL | Age: 62
End: 2022-12-13
Payer: COMMERCIAL

## 2022-12-13 ENCOUNTER — HOSPITAL ENCOUNTER (EMERGENCY)
Facility: HOSPITAL | Age: 62
Discharge: HOME | End: 2022-12-13
Attending: STUDENT IN AN ORGANIZED HEALTH CARE EDUCATION/TRAINING PROGRAM | Admitting: STUDENT IN AN ORGANIZED HEALTH CARE EDUCATION/TRAINING PROGRAM
Payer: COMMERCIAL

## 2022-12-13 ENCOUNTER — PATIENT OUTREACH (OUTPATIENT)
Dept: ONCOLOGY | Facility: OTHER | Age: 62
End: 2022-12-13

## 2022-12-13 ENCOUNTER — APPOINTMENT (OUTPATIENT)
Dept: GENERAL RADIOLOGY | Facility: HOSPITAL | Age: 62
End: 2022-12-13
Attending: STUDENT IN AN ORGANIZED HEALTH CARE EDUCATION/TRAINING PROGRAM
Payer: COMMERCIAL

## 2022-12-13 ENCOUNTER — RESULTS ONLY (OUTPATIENT)
Dept: RADIATION ONCOLOGY | Facility: HOSPITAL | Age: 62
End: 2022-12-13

## 2022-12-13 VITALS
DIASTOLIC BLOOD PRESSURE: 74 MMHG | TEMPERATURE: 101.3 F | HEART RATE: 82 BPM | WEIGHT: 237.5 LBS | SYSTOLIC BLOOD PRESSURE: 124 MMHG | RESPIRATION RATE: 19 BRPM | BODY MASS INDEX: 35.07 KG/M2 | OXYGEN SATURATION: 94 %

## 2022-12-13 VITALS
WEIGHT: 235 LBS | SYSTOLIC BLOOD PRESSURE: 134 MMHG | TEMPERATURE: 100.1 F | OXYGEN SATURATION: 95 % | HEART RATE: 65 BPM | DIASTOLIC BLOOD PRESSURE: 81 MMHG | BODY MASS INDEX: 34.7 KG/M2 | RESPIRATION RATE: 18 BRPM

## 2022-12-13 DIAGNOSIS — J18.9 COMMUNITY ACQUIRED PNEUMONIA, UNSPECIFIED LATERALITY: ICD-10-CM

## 2022-12-13 DIAGNOSIS — C09.9 MALIGNANT NEOPLASM OF TONSIL (H): ICD-10-CM

## 2022-12-13 DIAGNOSIS — J12.1 PNEUMONIA DUE TO RESPIRATORY SYNCYTIAL VIRUS (RSV): ICD-10-CM

## 2022-12-13 DIAGNOSIS — C09.9 MALIGNANT NEOPLASM OF TONSIL (H): Primary | ICD-10-CM

## 2022-12-13 LAB
ALBUMIN UR-MCNC: 30 MG/DL
APPEARANCE UR: CLEAR
BASOPHILS # BLD AUTO: 0 10E3/UL (ref 0–0.2)
BASOPHILS NFR BLD AUTO: 0 %
BILIRUB UR QL STRIP: NEGATIVE
COLOR UR AUTO: YELLOW
EOSINOPHIL # BLD AUTO: 0.1 10E3/UL (ref 0–0.7)
EOSINOPHIL NFR BLD AUTO: 1 %
ERYTHROCYTE [DISTWIDTH] IN BLOOD BY AUTOMATED COUNT: 11.9 % (ref 10–15)
FLUAV RNA SPEC QL NAA+PROBE: NEGATIVE
FLUBV RNA RESP QL NAA+PROBE: NEGATIVE
GLUCOSE UR STRIP-MCNC: NEGATIVE MG/DL
HCT VFR BLD AUTO: 46.6 % (ref 40–53)
HGB BLD-MCNC: 16.5 G/DL (ref 13.3–17.7)
HGB UR QL STRIP: ABNORMAL
HOLD SPECIMEN: NORMAL
IMM GRANULOCYTES # BLD: 0.1 10E3/UL
IMM GRANULOCYTES NFR BLD: 1 %
KETONES UR STRIP-MCNC: NEGATIVE MG/DL
LACTATE SERPL-SCNC: 1 MMOL/L (ref 0.7–2)
LEUKOCYTE ESTERASE UR QL STRIP: NEGATIVE
LYMPHOCYTES # BLD AUTO: 0.7 10E3/UL (ref 0.8–5.3)
LYMPHOCYTES NFR BLD AUTO: 5 %
MCH RBC QN AUTO: 31 PG (ref 26.5–33)
MCHC RBC AUTO-ENTMCNC: 35.4 G/DL (ref 31.5–36.5)
MCV RBC AUTO: 88 FL (ref 78–100)
MONOCYTES # BLD AUTO: 1.2 10E3/UL (ref 0–1.3)
MONOCYTES NFR BLD AUTO: 8 %
MUCOUS THREADS #/AREA URNS LPF: PRESENT /LPF
NEUTROPHILS # BLD AUTO: 12.6 10E3/UL (ref 1.6–8.3)
NEUTROPHILS NFR BLD AUTO: 85 %
NITRATE UR QL: NEGATIVE
NRBC # BLD AUTO: 0 10E3/UL
NRBC BLD AUTO-RTO: 0 /100
PH UR STRIP: 6 [PH] (ref 4.7–8)
PLATELET # BLD AUTO: 182 10E3/UL (ref 150–450)
RAD ONC ARIA COURSE ID: NORMAL
RAD ONC ARIA COURSE LAST TREATMENT DATE: NORMAL
RAD ONC ARIA COURSE START DATE: NORMAL
RAD ONC ARIA COURSE TREATMENT ELAPSED DAYS: 14
RAD ONC ARIA FIRST TREATMENT DATE: NORMAL
RAD ONC ARIA PLAN FRACTIONS TREATED TO DATE: 11
RAD ONC ARIA PLAN ID: NORMAL
RAD ONC ARIA PLAN PRESCRIBED DOSE PER FRACTION: 2 GY
RAD ONC ARIA PLAN TOTAL FRACTIONS PRESCRIBED: 35
RAD ONC ARIA PLAN TOTAL PRESCRIBED DOSE: 7000 CGY
RAD ONC ARIA REFERENCE POINT DOSAGE GIVEN TO DATE: NORMAL GY
RAD ONC ARIA REFERENCE POINT DOSAGE GIVEN TO DATE: NORMAL GY
RAD ONC ARIA REFERENCE POINT ID: NORMAL
RAD ONC ARIA REFERENCE POINT ID: NORMAL
RBC # BLD AUTO: 5.32 10E6/UL (ref 4.4–5.9)
RBC URINE: 4 /HPF
RSV RNA SPEC NAA+PROBE: POSITIVE
SARS-COV-2 RNA RESP QL NAA+PROBE: NEGATIVE
SP GR UR STRIP: 1.03 (ref 1–1.03)
SQUAMOUS EPITHELIAL: 0 /HPF
UROBILINOGEN UR STRIP-MCNC: NORMAL MG/DL
WBC # BLD AUTO: 14.6 10E3/UL (ref 4–11)
WBC URINE: 2 /HPF

## 2022-12-13 PROCEDURE — 83605 ASSAY OF LACTIC ACID: CPT | Performed by: STUDENT IN AN ORGANIZED HEALTH CARE EDUCATION/TRAINING PROGRAM

## 2022-12-13 PROCEDURE — 250N000011 HC RX IP 250 OP 636: Performed by: STUDENT IN AN ORGANIZED HEALTH CARE EDUCATION/TRAINING PROGRAM

## 2022-12-13 PROCEDURE — 36415 COLL VENOUS BLD VENIPUNCTURE: CPT | Performed by: STUDENT IN AN ORGANIZED HEALTH CARE EDUCATION/TRAINING PROGRAM

## 2022-12-13 PROCEDURE — 87040 BLOOD CULTURE FOR BACTERIA: CPT | Performed by: STUDENT IN AN ORGANIZED HEALTH CARE EDUCATION/TRAINING PROGRAM

## 2022-12-13 PROCEDURE — 250N000013 HC RX MED GY IP 250 OP 250 PS 637: Performed by: STUDENT IN AN ORGANIZED HEALTH CARE EDUCATION/TRAINING PROGRAM

## 2022-12-13 PROCEDURE — 71046 X-RAY EXAM CHEST 2 VIEWS: CPT

## 2022-12-13 PROCEDURE — 87637 SARSCOV2&INF A&B&RSV AMP PRB: CPT | Performed by: STUDENT IN AN ORGANIZED HEALTH CARE EDUCATION/TRAINING PROGRAM

## 2022-12-13 PROCEDURE — 81001 URINALYSIS AUTO W/SCOPE: CPT | Performed by: STUDENT IN AN ORGANIZED HEALTH CARE EDUCATION/TRAINING PROGRAM

## 2022-12-13 PROCEDURE — 99284 EMERGENCY DEPT VISIT MOD MDM: CPT | Mod: CS | Performed by: STUDENT IN AN ORGANIZED HEALTH CARE EDUCATION/TRAINING PROGRAM

## 2022-12-13 PROCEDURE — 99284 EMERGENCY DEPT VISIT MOD MDM: CPT | Mod: 25,CS

## 2022-12-13 PROCEDURE — 85004 AUTOMATED DIFF WBC COUNT: CPT | Performed by: STUDENT IN AN ORGANIZED HEALTH CARE EDUCATION/TRAINING PROGRAM

## 2022-12-13 PROCEDURE — C9803 HOPD COVID-19 SPEC COLLECT: HCPCS

## 2022-12-13 RX ORDER — CEPHALEXIN 500 MG/1
500 CAPSULE ORAL 4 TIMES DAILY
Qty: 28 CAPSULE | Refills: 0 | Status: SHIPPED | OUTPATIENT
Start: 2022-12-13 | End: 2022-12-20

## 2022-12-13 RX ORDER — FLUCONAZOLE 100 MG/1
100 TABLET ORAL DAILY
Qty: 15 TABLET | Refills: 0 | Status: SHIPPED | OUTPATIENT
Start: 2022-12-13 | End: 2022-12-28

## 2022-12-13 RX ORDER — ACETAMINOPHEN 325 MG/1
650 TABLET ORAL ONCE
Status: COMPLETED | OUTPATIENT
Start: 2022-12-13 | End: 2022-12-13

## 2022-12-13 RX ORDER — AZITHROMYCIN 250 MG/1
TABLET, FILM COATED ORAL
Qty: 6 TABLET | Refills: 0 | Status: SHIPPED | OUTPATIENT
Start: 2022-12-13 | End: 2022-12-13

## 2022-12-13 RX ORDER — HEPARIN SODIUM (PORCINE) LOCK FLUSH IV SOLN 100 UNIT/ML 100 UNIT/ML
5 SOLUTION INTRAVENOUS EVERY 8 HOURS
Status: DISCONTINUED | OUTPATIENT
Start: 2022-12-13 | End: 2022-12-13 | Stop reason: HOSPADM

## 2022-12-13 RX ORDER — DOXYCYCLINE 75 MG/1
75 TABLET ORAL DAILY
Qty: 7 TABLET | Refills: 0 | Status: SHIPPED | OUTPATIENT
Start: 2022-12-13 | End: 2022-12-20

## 2022-12-13 RX ADMIN — HEPARIN 5 ML: 100 SYRINGE at 13:18

## 2022-12-13 RX ADMIN — DEXAMETHASONE 10 MG: 2 TABLET ORAL at 11:49

## 2022-12-13 RX ADMIN — ACETAMINOPHEN 650 MG: 325 TABLET, FILM COATED ORAL at 10:44

## 2022-12-13 ASSESSMENT — PAIN SCALES - GENERAL: PAINLEVEL: MODERATE PAIN (5)

## 2022-12-13 ASSESSMENT — ACTIVITIES OF DAILY LIVING (ADL)
ADLS_ACUITY_SCORE: 35
ADLS_ACUITY_SCORE: 35

## 2022-12-13 NOTE — ED NOTES
DATE:  12/13/2022   TIME OF RECEIPT FROM LAB:  1125  LAB TEST:  RSV+  LAB VALUE:  +  RESULTS GIVEN WITH READ-BACK TO (PROVIDER):  Michael Dickson MD  TIME LAB VALUE REPORTED TO PROVIDER:   1128

## 2022-12-13 NOTE — PROGRESS NOTES
[facility]: Cancer Care                                                                                          Patient seen in radiation. They reported patient has a fever of 101.3, congestion and cough. Recommendations from medical oncology to have them send patient to the the ER for evaluation for neutropenic fever. Chemotherapy will be canceled today.    Signature:  Isabelle Barnes RN

## 2022-12-13 NOTE — PROGRESS NOTES
Progress Notes  Encounter Date: Dec 13, 2022  СЕРГЕЙ Méndez CNP     RADIATION ONCOLOGY WEEKLY MANAGEMENT PROGRESS NOTE     Patient Care Team       Relationship Specialty Notifications Start End    No Ref-Primary, Physician PCP - General   11/14/22     Fax: [phone]         Maryam Cannon MD Assigned PCP   9/24/22     Phone: [phone] Fax: [phone] [address] [place] [address]    Vicki Alaniz MD Assigned Cancer Care Provider   11/26/22     Phone: [phone] Fax: [phone]         [address] [place] [address]                  DIAGNOSIS:  Cancer Staging   Malignant neoplasm of tonsil (H)  Staging form: Pharynx - Oropharynx, AJCC 8th Edition  - Clinical stage from 11/16/2022: Stage I (cT1, cN1, cM0, p16+) - Signed by Vicki Alaniz MD on 11/16/2022        RADIATION THERAPY:    Melchor Sadler has received 2200 cGy to date to left tonsil.  Treatment 11/35  Total planned dose 7000 cGy   Systemic Therapy: Weekly cisplatin         SUBJECTIVE:        OBJECTIVE:    WEIGHT: 237 lbs 8 oz.  Examination reveals alert non ill appearing male patient, respirations easy non labored. Small left slightly ulcerated region to the left posterior oropharynx (tonsillar fossa).  6 x 4.5 cm lymph node present to left side of neck.       IMPRESSION:  Routine tolerance to radiation therapy.       PLAN:  Continue treatment as planned. Going to try a humidifier at night to see if it helps the dry feeling in his throat. ***                Treatment plan reviewed.  Contralateral neck not being treated. ACR Appropriatness Criteria allow for treating Ipsi neck-only for T0-T1 (up to 6 cm  For p16+) disease.       Medical record and imaging reviewed.      СЕРГЕЙ Méndez CNP

## 2022-12-13 NOTE — DISCHARGE INSTRUCTIONS
- Please take the prescribed antibiotics and use Oxygen for your symptoms. Please use the antifungal for your thrush.  - Please return to the Emergency Room if you do not improve, feel worse, or have any new or concerning symptoms. We would especially want to see you back if you experience more difficulty breathing  - Please follow up with a primary care physician in 2-3 days

## 2022-12-13 NOTE — ED NOTES
AVS reviewed. All questions and concerns answered. No further questions at this time.  Home oximeter education reviewed, return demonstration well.   No questions one home o2 tank/concentrator, demonstrates hook up tp concentrator well.

## 2022-12-13 NOTE — ED NOTES
Care Transitions focused note:      Home oxygen set up with [facility] medical supply.  They will bring up tx tank and deliver concentrator to the home    VANESSA Montoya

## 2022-12-13 NOTE — ED TRIAGE NOTES
"\"Here for cough, fever and a headache since about Sunday.  No Tylenol or Ibuprofen taken today.\"       "

## 2022-12-13 NOTE — ED NOTES
Hand hygiene performed: yes   Mask donned by caregiver: yes   Site prepped with CHG: yes   Labs drawn: yes   Dressing applied using aseptic technique: yes   Comment:tolerate well. Blood retuned and labs drawn, flushed with 20cc after labs

## 2022-12-13 NOTE — PROGRESS NOTES
Progress Notes  Encounter Date: Dec 13, 2022  СЕРГЕЙ Méndez [facility]     RADIATION ONCOLOGY WEEKLY MANAGEMENT PROGRESS NOTE     Patient Care Team       Relationship Specialty Notifications Start End    No Ref-Primary, Physician PCP - General   11/14/22     Fax: [phone]         Maryam Cannon MD Assigned PCP   9/24/22     Phone: [phone] Fax: [phone] [address] [place] [address]    Vicki Alaniz MD Assigned Cancer Care Provider   11/26/22     Phone: [phone] Fax: [phone]         [address] [place] [address]          DIAGNOSIS:  Cancer Staging   Malignant neoplasm of tonsil (H)  Staging form: Pharynx - Oropharynx, AJCC 8th Edition  - Clinical stage from 11/16/2022: Stage I (cT1, cN1, cM0, p16+) - Signed by Vicki Alaniz MD on 11/16/2022    RADIATION THERAPY:    Melchor Sadler has received 2200 cGy to date to left tonsil.  Treatment 11/35  Total planned dose 7000 cGy   Systemic Therapy: Weekly cisplatin         SUBJECTIVE: Patient not feeling well today, states he has had a fever, cough, congestion, and sore throat.  He is due to have a chemo infusion today.       OBJECTIVE:    WEIGHT: 237 lbs 8 oz.  Examination reveals alert non ill appearing male patient, respirations easy non labored. Small left slightly ulcerated region to the left posterior oropharynx (tonsillar fossa).   6 x 4.5 cm lymph node present to left side of neck.  White plaque coating tongue and oropharynx, consistent with thrush.      IMPRESSION:  Routine tolerance to radiation therapy.       PLAN:  Continue treatment as planned. Treatment plan reviewed.  Contralateral neck not being treated. ACR Appropriatness Criteria allow for treating Ipsi neck-only for T0-T1 (up to 6 cm  For p16+) disease.  Patient sent to emergency department for further evaluation for fever, cough, and congestion.  He will continue with the nystatin for thrush.  Medical oncology updated, he will not receive his  infusion today.  Patient states understanding denies further questions or concerns at this time.         Medical record and imaging reviewed.      СЕРГЕЙ Méndez CNP

## 2022-12-13 NOTE — ED PROVIDER NOTES
History     Chief Complaint   Patient presents with     Headache     Fever     Cough     HPI  Melchor Sadler is a 62 year old male who presents with headache, sore throat, cough, fever. Currently on chemotherapy. He has felt well until 4 days ago when he developed these symptoms. Has not taken tylenol/ibuprofen. No neck stiffness. Believes he is not neutropenic. No nausea/vomiting/diarrhea. No burning with urination. Unsure of sick contacts. Did not get chemotherapy today. No lightheadedness. No chest pain. Does have some phlegm. No hx of COPD/Asthma. Does not use inhalers.     Allergies:  Allergies   Allergen Reactions     Codeine Other (See Comments)     Anxiety     Penicillins Anxiety     Pt states his sister and father are allergic, but he doesn't believe he is. 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-Dr. Jamey Houston       Family History:    Family History   Problem Relation Age of Onset     Lung Cancer Mother      Cancer Mother      Diabetes Father      Colon Cancer Maternal Grandmother      Hyperlipidemia No family hx of      Coronary Artery Disease No family hx of      Hypertension No family hx of      Breast Cancer No family  hx of      Prostate Cancer No family hx of      Depression No family hx of      Anxiety Disorder No family hx of      Mental Illness No family hx of      Anesthesia Reaction No family hx of      Asthma No family hx of        Social History:  Marital Status:   [2]  Social History     Tobacco Use     Smoking status: Never     Smokeless tobacco: Former     Types: Chew     Quit date: 1995   Vaping Use     Vaping Use: Never used   Substance Use Topics     Alcohol use: Not Currently     Drug use: No     Comment: Drug use: No        Medications:    azithromycin (ZITHROMAX Z-HIMANSHU) 250 MG tablet  cephALEXin (KEFLEX) 500 MG capsule  fluconazole (DIFLUCAN) 100 MG tablet  dexamethasone (DECADRON) 4 MG tablet  magic mouthwash (ENTER INGREDIENTS IN COMMENTS) suspension  magnesium oxide (MAG-OX) 400 MG tablet  NONFORMULARY  nystatin (MYCOSTATIN) 090898 UNIT/ML suspension  ondansetron (ZOFRAN) 8 MG tablet  prochlorperazine (COMPAZINE) 10 MG tablet  UNKNOWN TO PATIENT  zinc gluconate 50 MG tablet          Review of Systems   All other systems reviewed and are negative.      Physical Exam   BP: 126/81  Pulse: 80  Temp: (!) 102.3  F (39.1  C)  Resp: 18  Weight: 106.6 kg (235 lb)  SpO2: 93 %      Physical Exam  Vitals and nursing note reviewed.   Constitutional:       General: He is not in acute distress.     Appearance: Normal appearance. He is not ill-appearing or toxic-appearing.   HENT:      Head: Normocephalic and atraumatic.      Right Ear: External ear normal.      Left Ear: External ear normal.      Nose: Congestion and rhinorrhea present.      Mouth/Throat:      Mouth: Mucous membranes are moist.      Comments: Thrush on tongue  Eyes:      Extraocular Movements: Extraocular movements intact.      Pupils: Pupils are equal, round, and reactive to light.   Cardiovascular:      Rate and Rhythm: Normal rate and regular rhythm.      Pulses: Normal pulses.      Heart sounds: Normal heart sounds.   Pulmonary:      Effort:  Pulmonary effort is normal. No respiratory distress.      Breath sounds: No stridor. No wheezing, rhonchi or rales.   Chest:      Chest wall: No tenderness.   Abdominal:      General: Abdomen is flat. There is no distension.      Palpations: Abdomen is soft.      Tenderness: There is no abdominal tenderness.   Musculoskeletal:         General: Normal range of motion.      Cervical back: Normal range of motion.   Skin:     General: Skin is warm.      Capillary Refill: Capillary refill takes less than 2 seconds.   Neurological:      General: No focal deficit present.      Mental Status: He is alert and oriented to person, place, and time.   Psychiatric:         Mood and Affect: Mood normal.       ED Course        ED Course as of 12/13/22 1212   Tue Dec 13, 2022   1125 Resp Syncytial Virus(!): Positive  RSV positive is likely consistent. Given the XR is non-diagnostic, patient has cancer, and cannot obtain CT, discussed risks/benefits and will treat with antibiotics.   1131 Oxygen Documentation:   I certify that this patient, Melchor Sadler has been under my care (or a nurse practitioner or physician's assistant working with me). This is the face-to-face encounter for oxygen medical necessity.      Melchor Sadler is now in a chronic stable state and continues to require supplemental oxygen. Patient has continued oxygen desaturation due to RSV Pneumonia.    Alternative treatment(s) tried or considered and deemed clinically infective for treatment of RSV pneumonia include steroids.  If portability is ordered, is the patient mobile within the home? yes    **Patients who qualify for home O2 coverage under the CMS guidelines require ABG tests or O2 sat readings obtained closest to, but no earlier than 2 days prior to the discharge, as evidence of the need for home oxygen therapy. Testing must be performed while patient is in the chronic stable state. See notes for O2 sats.**   1209 Findings were discussed with the patient.  Additional verbal instructions were discussed with the patient as well. Instructed to follow up with a primary care provider within 3 days. Also discussed specific warning signs and instructed to return to the ED if there are any concerns. Patient voiced understanding of instructions, questions were answered and the patient was discharged home in stable condition.     Procedures              Results for orders placed or performed during the hospital encounter of 12/13/22 (from the past 24 hour(s))   Symptomatic Influenza A/B & SARS-CoV2 (COVID-19) Virus PCR Multiplex Nasopharyngeal    Specimen: Nasopharyngeal; Swab   Result Value Ref Range    Influenza A PCR Negative Negative    Influenza B PCR Negative Negative    RSV PCR Positive (A) Negative    SARS CoV2 PCR Negative Negative    Narrative    Testing was performed using the Xpert Xpress CoV2/Flu/RSV Assay on the Cepheid GeneXpert Instrument. This test should be ordered for the detection of SARS-CoV-2 and influenza viruses in individuals who meet clinical and/or epidemiological criteria. Test performance is unknown in asymptomatic patients. This test is for in vitro diagnostic use under the FDA EUA for laboratories certified under CLIA to perform high or moderate complexity testing. This test has not been FDA cleared or approved. A negative result does not rule out the presence of PCR inhibitors in the specimen or target RNA in concentration below the limit of detection for the assay. If only one viral target is positive but coinfection with multiple targets is suspected, the sample should be re-tested with another FDA cleared, approved, or authorized test, if coinfection would change clinical management. This test was validated by the [facility]. These laboratories are certified under the Clinical Laboratory Improvement Amendments of 1988 (CLIA-88) as qualified to perform high complexity laboratory testing.   [place] Draw *Canceled*     Narrative    The following orders were created for panel order [place] Draw.  Procedure                               Abnormality         Status                     ---------                               -----------         ------                       Please view results for these tests on the individual orders.   CBC with Platelets & Differential    Narrative    The following orders were created for panel order CBC with Platelets & Differential.  Procedure                               Abnormality         Status                     ---------                               -----------         ------                     CBC with platelets and d...[[id number]]  Abnormal            Final result                 Please view results for these tests on the individual orders.   Lactic acid whole blood   Result Value Ref Range    Lactic Acid 1.0 0.7 - 2.0 mmol/L   CBC with platelets and differential   Result Value Ref Range    WBC Count 14.6 (H) 4.0 - 11.0 10e3/uL    RBC Count 5.32 4.40 - 5.90 10e6/uL    Hemoglobin 16.5 13.3 - 17.7 g/dL    Hematocrit 46.6 40.0 - 53.0 %    MCV 88 78 - 100 fL    MCH 31.0 26.5 - 33.0 pg    MCHC 35.4 31.5 - 36.5 g/dL    RDW 11.9 10.0 - 15.0 %    Platelet Count 182 150 - 450 10e3/uL    % Neutrophils 85 %    % Lymphocytes 5 %    % Monocytes 8 %    % Eosinophils 1 %    % Basophils 0 %    % Immature Granulocytes 1 %    NRBCs per 100 WBC 0 <1 /100    Absolute Neutrophils 12.6 (H) 1.6 - 8.3 10e3/uL    Absolute Lymphocytes 0.7 (L) 0.8 - 5.3 10e3/uL    Absolute Monocytes 1.2 0.0 - 1.3 10e3/uL    Absolute Eosinophils 0.1 0.0 - 0.7 10e3/uL    Absolute Basophils 0.0 0.0 - 0.2 10e3/uL    Absolute Immature Granulocytes 0.1 <=0.4 10e3/uL    Absolute NRBCs 0.0 10e3/uL   Extra Tube    Narrative    The following orders were created for panel order Extra Tube.  Procedure                               Abnormality         Status                     ---------                               -----------         ------                      Extra Green Top (Lithium...[[id number]]                      Final result                 Please view results for these tests on the individual orders.   Extra Green Top (Lithium Heparin) Tube   Result Value Ref Range    Hold Specimen JIC    Chest XR,  PA & LAT    Narrative    PROCEDURE:  XR CHEST 2 VIEWS    HISTORY: Cough, fever, .    COMPARISON:  7/18/2015    FINDINGS:  The cardiomediastinal contours are upper normal. The trachea is  midline.  Streaky pulmonary opacities are best seen in the left infrahilar  space. No effusion or pneumothorax is seen.    No suspicious osseous lesion or subdiaphragmatic free air.      Impression    IMPRESSION:      Question subtle multifocal infiltrates. Recommend follow-up after  appropriate clinical therapy.      RENEE WATSON MD         SYSTEM ID:  [id number]   UA with Microscopic reflex to Culture    Specimen: Urine, Clean Catch   Result Value Ref Range    Color Urine Yellow Colorless, Straw, Light Yellow, Yellow    Appearance Urine Clear Clear    Glucose Urine Negative Negative mg/dL    Bilirubin Urine Negative Negative    Ketones Urine Negative Negative mg/dL    Specific Gravity Urine 1.027 1.003 - 1.035    Blood Urine Trace (A) Negative    pH Urine 6.0 4.7 - 8.0    Protein Albumin Urine 30 (A) Negative mg/dL    Urobilinogen Urine Normal Normal, 2.0 mg/dL    Nitrite Urine Negative Negative    Leukocyte Esterase Urine Negative Negative    Mucus Urine Present (A) None Seen /LPF    RBC Urine 4 (H) <=2 /HPF    WBC Urine 2 <=5 /HPF    Squamous Epithelials Urine 0 <=1 /HPF    Narrative    Urine Culture not indicated       Medications   sodium chloride (PF) 0.9% PF flush 10-20 mL (has no administration in time range)   sodium chloride (PF) 0.9% PF flush 10-20 mL (has no administration in time range)   sodium chloride (PF) 0.9% PF flush 10-20 mL (20 mLs Intracatheter Given 12/13/22 1048)   acetaminophen (TYLENOL) tablet 650 mg (650 mg Oral Given 12/13/22 1044)    dexamethasone (DECADRON) tablet 10 mg (10 mg Oral Given 12/13/22 0890)       Assessments & Plan (with Medical Decision Making)     I have reviewed the nursing notes.    62yoM on chemo with febver, URI type symptoms. Plan on XR, covid/influenza/RSV. RSV was positive which is likely cause of symptoms. XR non-diagnostic with possible subtle multifocal pneumonia. Exam consistent with thrush. Has cancer. Will treat. Offered inpatient admission but patient declined. Will plan on O2 at home, antibiotics out of abundance of caution, steroids, and then return if worsening. Needs very close follow-up. Not consistent with PE. Not consistent with cardiac etiology.    See ED Course.    I have reviewed the findings, diagnosis, plan and need for follow up with the patient.    New Prescriptions    AZITHROMYCIN (ZITHROMAX Z-HIMANSHU) 250 MG TABLET    Two tablets on the first day, then one tablet daily for the next 4 days    CEPHALEXIN (KEFLEX) 500 MG CAPSULE    Take 1 capsule (500 mg) by mouth 4 times daily for 7 days    FLUCONAZOLE (DIFLUCAN) 100 MG TABLET    Take 1 tablet (100 mg) by mouth daily for 15 days       Final diagnoses:   Pneumonia due to respiratory syncytial virus (RSV)   Community acquired pneumonia, unspecified laterality       12/13/2022   [facility] EM[facility]     Michael Dickson MD  12/13/22 1212

## 2022-12-13 NOTE — PROGRESS NOTES
11:30 AM -  [facility] received intake call from Christina in the ED for Home Oxygen.  We will review documentation and dispatch  once orders are complete.    12:35 PM -  All documentation in order.   has been dispatched to ED for O2 delivery.  HPI      ROS      Physical Exam

## 2022-12-13 NOTE — ED NOTES
"Pt and wife are concerned about Azithromycin RX, states it causes insomnia and he gets \"hyper\".  Inquiring if there is an alternative RX he can use.  Dr. Dickson updated.   See new orders  "

## 2022-12-19 ENCOUNTER — APPOINTMENT (OUTPATIENT)
Dept: RADIATION ONCOLOGY | Facility: HOSPITAL | Age: 62
End: 2022-12-19
Payer: COMMERCIAL

## 2022-12-19 ENCOUNTER — RESULTS ONLY (OUTPATIENT)
Dept: RADIATION ONCOLOGY | Facility: HOSPITAL | Age: 62
End: 2022-12-19

## 2022-12-19 LAB
BACTERIA BLD CULT: NO GROWTH
BACTERIA BLD CULT: NO GROWTH
RAD ONC ARIA COURSE ID: NORMAL
RAD ONC ARIA COURSE LAST TREATMENT DATE: NORMAL
RAD ONC ARIA COURSE START DATE: NORMAL
RAD ONC ARIA COURSE TREATMENT ELAPSED DAYS: 20
RAD ONC ARIA FIRST TREATMENT DATE: NORMAL
RAD ONC ARIA PLAN FRACTIONS TREATED TO DATE: 12
RAD ONC ARIA PLAN ID: NORMAL
RAD ONC ARIA PLAN PRESCRIBED DOSE PER FRACTION: 2 GY
RAD ONC ARIA PLAN TOTAL FRACTIONS PRESCRIBED: 35
RAD ONC ARIA PLAN TOTAL PRESCRIBED DOSE: 7000 CGY
RAD ONC ARIA REFERENCE POINT DOSAGE GIVEN TO DATE: NORMAL GY
RAD ONC ARIA REFERENCE POINT DOSAGE GIVEN TO DATE: NORMAL GY
RAD ONC ARIA REFERENCE POINT ID: NORMAL
RAD ONC ARIA REFERENCE POINT ID: NORMAL

## 2022-12-19 PROCEDURE — 77386 HC IMRT TREATMENT DELIVERY, COMPLEX: CPT | Performed by: RADIOLOGY

## 2022-12-19 PROCEDURE — 77014 HC CT GUIDE FOR PLACEMENT RADIATION THERAPY FIELDS: CPT | Performed by: RADIOLOGY

## 2022-12-19 NOTE — PROGRESS NOTES
Progress Notes  Encounter Date: Dec 20, 2022  СЕРГЕЙ Méndez [facility]     RADIATION ONCOLOGY WEEKLY MANAGEMENT PROGRESS NOTE     Patient Care Team       Relationship Specialty Notifications Start End    No Ref-Primary, Physician PCP - General   11/14/22     Fax: [phone]         Maryam Cannon MD Assigned PCP   9/24/22     Phone: [phone] Fax: [phone] [address] [place] [address]    Vicki Alaniz MD Assigned Cancer Care Provider   11/26/22     Phone: [phone] Fax: [phone]         [address] [place] [address]          DIAGNOSIS:  Cancer Staging   Malignant neoplasm of tonsil (H)  Staging form: Pharynx - Oropharynx, AJCC 8th Edition  - Clinical stage from 11/16/2022: Stage I (cT1, cN1, cM0, p16+) - Signed by Vicki Alaniz MD on 11/16/2022    RADIATION THERAPY:    Melchor Sadler has received 2600 cGy to date to left tonsil.  Treatment 13/35  Total planned dose 7000 cGy   Systemic Therapy: Weekly cisplatin       SUBJECTIVE: Currently using 1 1/2 L of oxygen. Reports he is hardly coughing anymore.   States he has been practicing deep breathing. Struggling to sleep after he started taking antibiotics. Patient was started on Diflucan.  Denies fever.         OBJECTIVE:   Examination reveals alert non ill appearing male patient, respirations easy non labored, 1.5 L O2. Small left slightly ulcerated region to the left posterior oropharynx (tonsillar fossa).   3 x 2 cm lymph node present to left side of neck significantly smaller in size from previous examination. Oropharynx pink, white coating significantly improved, slight white plaque still present to base of tongue.       IMPRESSION:  Routine tolerance to radiation therapy.      PLAN:  Continue treatment as planned. Treatment plan reviewed.  Contralateral neck not being treated. ACR Appropriatness Criteria allow for treating Ipsi neck-only for T0-T1 (up to 6 cm  For p16+) disease. Educated on importance of  nutritional intake.          Medical record and imaging reviewed.      СЕРГЕЙ Méndez CNP    Shirlene Dhaliwal MD, PhD  Department of Radiation Oncology   [facility]  Tel: [phone]  Page: [phone]

## 2022-12-20 ENCOUNTER — RESULTS ONLY (OUTPATIENT)
Dept: RADIATION ONCOLOGY | Facility: HOSPITAL | Age: 62
End: 2022-12-20

## 2022-12-20 ENCOUNTER — LAB (OUTPATIENT)
Dept: ONCOLOGY | Facility: OTHER | Age: 62
End: 2022-12-20
Attending: NURSE PRACTITIONER
Payer: COMMERCIAL

## 2022-12-20 ENCOUNTER — OFFICE VISIT (OUTPATIENT)
Dept: RADIATION ONCOLOGY | Facility: HOSPITAL | Age: 62
End: 2022-12-20
Payer: COMMERCIAL

## 2022-12-20 ENCOUNTER — INFUSION THERAPY VISIT (OUTPATIENT)
Dept: INFUSION THERAPY | Facility: OTHER | Age: 62
End: 2022-12-20
Attending: NURSE PRACTITIONER
Payer: COMMERCIAL

## 2022-12-20 VITALS
BODY MASS INDEX: 33.18 KG/M2 | SYSTOLIC BLOOD PRESSURE: 128 MMHG | OXYGEN SATURATION: 97 % | DIASTOLIC BLOOD PRESSURE: 78 MMHG | TEMPERATURE: 98.7 F | RESPIRATION RATE: 18 BRPM | HEART RATE: 72 BPM | HEIGHT: 69 IN | WEIGHT: 224 LBS

## 2022-12-20 VITALS
DIASTOLIC BLOOD PRESSURE: 86 MMHG | TEMPERATURE: 98.7 F | HEART RATE: 86 BPM | OXYGEN SATURATION: 96 % | SYSTOLIC BLOOD PRESSURE: 128 MMHG | RESPIRATION RATE: 22 BRPM

## 2022-12-20 VITALS — SYSTOLIC BLOOD PRESSURE: 132 MMHG | DIASTOLIC BLOOD PRESSURE: 65 MMHG | HEART RATE: 70 BPM

## 2022-12-20 DIAGNOSIS — C09.9 MALIGNANT NEOPLASM OF TONSIL (H): ICD-10-CM

## 2022-12-20 DIAGNOSIS — Z51.11 ENCOUNTER FOR ANTINEOPLASTIC CHEMOTHERAPY: ICD-10-CM

## 2022-12-20 DIAGNOSIS — C09.9 MALIGNANT NEOPLASM OF TONSIL (H): Primary | ICD-10-CM

## 2022-12-20 DIAGNOSIS — Z51.11 ENCOUNTER FOR ANTINEOPLASTIC CHEMOTHERAPY: Primary | ICD-10-CM

## 2022-12-20 LAB
ALBUMIN SERPL BCG-MCNC: 3.9 G/DL (ref 3.5–5.2)
ALP SERPL-CCNC: 69 U/L (ref 40–129)
ALT SERPL W P-5'-P-CCNC: 26 U/L (ref 10–50)
ANION GAP SERPL CALCULATED.3IONS-SCNC: 11 MMOL/L (ref 7–15)
AST SERPL W P-5'-P-CCNC: 26 U/L (ref 10–50)
BASOPHILS # BLD AUTO: 0 10E3/UL (ref 0–0.2)
BASOPHILS NFR BLD AUTO: 1 %
BILIRUB SERPL-MCNC: 0.5 MG/DL
BUN SERPL-MCNC: 19.8 MG/DL (ref 8–23)
CALCIUM SERPL-MCNC: 9.2 MG/DL (ref 8.8–10.2)
CHLORIDE SERPL-SCNC: 100 MMOL/L (ref 98–107)
CREAT SERPL-MCNC: 0.94 MG/DL (ref 0.67–1.17)
DEPRECATED HCO3 PLAS-SCNC: 26 MMOL/L (ref 22–29)
EOSINOPHIL # BLD AUTO: 0.1 10E3/UL (ref 0–0.7)
EOSINOPHIL NFR BLD AUTO: 2 %
ERYTHROCYTE [DISTWIDTH] IN BLOOD BY AUTOMATED COUNT: 12 % (ref 10–15)
GFR SERPL CREATININE-BSD FRML MDRD: >90 ML/MIN/1.73M2
GLUCOSE SERPL-MCNC: 89 MG/DL (ref 70–99)
HCT VFR BLD AUTO: 43.6 % (ref 40–53)
HGB BLD-MCNC: 15.5 G/DL (ref 13.3–17.7)
IMM GRANULOCYTES # BLD: 0 10E3/UL
IMM GRANULOCYTES NFR BLD: 1 %
LYMPHOCYTES # BLD AUTO: 1.2 10E3/UL (ref 0.8–5.3)
LYMPHOCYTES NFR BLD AUTO: 21 %
MAGNESIUM SERPL-MCNC: 2.2 MG/DL (ref 1.7–2.3)
MCH RBC QN AUTO: 31.4 PG (ref 26.5–33)
MCHC RBC AUTO-ENTMCNC: 35.6 G/DL (ref 31.5–36.5)
MCV RBC AUTO: 88 FL (ref 78–100)
MONOCYTES # BLD AUTO: 0.6 10E3/UL (ref 0–1.3)
MONOCYTES NFR BLD AUTO: 10 %
NEUTROPHILS # BLD AUTO: 3.8 10E3/UL (ref 1.6–8.3)
NEUTROPHILS NFR BLD AUTO: 65 %
NRBC # BLD AUTO: 0 10E3/UL
NRBC BLD AUTO-RTO: 0 /100
PLATELET # BLD AUTO: 226 10E3/UL (ref 150–450)
POTASSIUM SERPL-SCNC: 4.4 MMOL/L (ref 3.4–5.3)
PROT SERPL-MCNC: 7.2 G/DL (ref 6.4–8.3)
RAD ONC ARIA COURSE ID: NORMAL
RAD ONC ARIA COURSE LAST TREATMENT DATE: NORMAL
RAD ONC ARIA COURSE START DATE: NORMAL
RAD ONC ARIA COURSE TREATMENT ELAPSED DAYS: 21
RAD ONC ARIA FIRST TREATMENT DATE: NORMAL
RAD ONC ARIA PLAN FRACTIONS TREATED TO DATE: 13
RAD ONC ARIA PLAN ID: NORMAL
RAD ONC ARIA PLAN PRESCRIBED DOSE PER FRACTION: 2 GY
RAD ONC ARIA PLAN TOTAL FRACTIONS PRESCRIBED: 35
RAD ONC ARIA PLAN TOTAL PRESCRIBED DOSE: 7000 CGY
RAD ONC ARIA REFERENCE POINT DOSAGE GIVEN TO DATE: NORMAL GY
RAD ONC ARIA REFERENCE POINT DOSAGE GIVEN TO DATE: NORMAL GY
RAD ONC ARIA REFERENCE POINT ID: NORMAL
RAD ONC ARIA REFERENCE POINT ID: NORMAL
RBC # BLD AUTO: 4.94 10E6/UL (ref 4.4–5.9)
SODIUM SERPL-SCNC: 137 MMOL/L (ref 136–145)
WBC # BLD AUTO: 5.8 10E3/UL (ref 4–11)

## 2022-12-20 PROCEDURE — 96413 CHEMO IV INFUSION 1 HR: CPT | Performed by: INTERNAL MEDICINE

## 2022-12-20 PROCEDURE — 83735 ASSAY OF MAGNESIUM: CPT | Performed by: INTERNAL MEDICINE

## 2022-12-20 PROCEDURE — 36591 DRAW BLOOD OFF VENOUS DEVICE: CPT | Performed by: INTERNAL MEDICINE

## 2022-12-20 PROCEDURE — 96367 TX/PROPH/DG ADDL SEQ IV INF: CPT | Performed by: INTERNAL MEDICINE

## 2022-12-20 PROCEDURE — 85025 COMPLETE CBC W/AUTO DIFF WBC: CPT | Performed by: INTERNAL MEDICINE

## 2022-12-20 PROCEDURE — 99215 OFFICE O/P EST HI 40 MIN: CPT | Mod: 25 | Performed by: INTERNAL MEDICINE

## 2022-12-20 PROCEDURE — 96375 TX/PRO/DX INJ NEW DRUG ADDON: CPT | Performed by: INTERNAL MEDICINE

## 2022-12-20 PROCEDURE — 80053 COMPREHEN METABOLIC PANEL: CPT | Performed by: INTERNAL MEDICINE

## 2022-12-20 RX ORDER — HEPARIN SODIUM,PORCINE 10 UNIT/ML
5 VIAL (ML) INTRAVENOUS
Status: CANCELLED | OUTPATIENT
Start: 2022-12-20

## 2022-12-20 RX ORDER — HEPARIN SODIUM (PORCINE) LOCK FLUSH IV SOLN 100 UNIT/ML 100 UNIT/ML
5 SOLUTION INTRAVENOUS
Status: CANCELLED | OUTPATIENT
Start: 2022-12-20

## 2022-12-20 RX ORDER — MEPERIDINE HYDROCHLORIDE 25 MG/ML
25 INJECTION INTRAMUSCULAR; INTRAVENOUS; SUBCUTANEOUS EVERY 30 MIN PRN
Status: CANCELLED | OUTPATIENT
Start: 2022-12-20

## 2022-12-20 RX ORDER — EPINEPHRINE 1 MG/ML
0.3 INJECTION, SOLUTION, CONCENTRATE INTRAVENOUS EVERY 5 MIN PRN
Status: CANCELLED | OUTPATIENT
Start: 2022-12-20

## 2022-12-20 RX ORDER — DIPHENHYDRAMINE HYDROCHLORIDE 50 MG/ML
50 INJECTION INTRAMUSCULAR; INTRAVENOUS
Status: CANCELLED
Start: 2022-12-20

## 2022-12-20 RX ORDER — LORAZEPAM 0.5 MG/1
0.5 TABLET ORAL AT BEDTIME
Qty: 2 TABLET | Refills: 0 | Status: SHIPPED | OUTPATIENT
Start: 2022-12-20 | End: 2023-01-03

## 2022-12-20 RX ORDER — ALBUTEROL SULFATE 0.83 MG/ML
2.5 SOLUTION RESPIRATORY (INHALATION)
Status: CANCELLED | OUTPATIENT
Start: 2022-12-20

## 2022-12-20 RX ORDER — PALONOSETRON 0.05 MG/ML
0.25 INJECTION, SOLUTION INTRAVENOUS ONCE
Status: COMPLETED | OUTPATIENT
Start: 2022-12-20 | End: 2022-12-20

## 2022-12-20 RX ORDER — ALBUTEROL SULFATE 90 UG/1
1-2 AEROSOL, METERED RESPIRATORY (INHALATION)
Status: CANCELLED
Start: 2022-12-20

## 2022-12-20 RX ORDER — LORAZEPAM 2 MG/ML
0.5 INJECTION INTRAMUSCULAR EVERY 4 HOURS PRN
Status: CANCELLED | OUTPATIENT
Start: 2022-12-20

## 2022-12-20 RX ORDER — METHYLPREDNISOLONE SODIUM SUCCINATE 125 MG/2ML
125 INJECTION, POWDER, LYOPHILIZED, FOR SOLUTION INTRAMUSCULAR; INTRAVENOUS
Status: CANCELLED
Start: 2022-12-20

## 2022-12-20 RX ORDER — HEPARIN SODIUM (PORCINE) LOCK FLUSH IV SOLN 100 UNIT/ML 100 UNIT/ML
5 SOLUTION INTRAVENOUS
Status: DISCONTINUED | OUTPATIENT
Start: 2022-12-20 | End: 2022-12-20 | Stop reason: HOSPADM

## 2022-12-20 RX ADMIN — PALONOSETRON 0.25 MG: 0.05 INJECTION, SOLUTION INTRAVENOUS at 10:47

## 2022-12-20 RX ADMIN — Medication 1000 ML: at 10:40

## 2022-12-20 RX ADMIN — HEPARIN SODIUM (PORCINE) LOCK FLUSH IV SOLN 100 UNIT/ML 5 ML: 100 SOLUTION at 12:54

## 2022-12-20 ASSESSMENT — PAIN SCALES - GENERAL
PAINLEVEL: NO PAIN (0)
PAINLEVEL: NO PAIN (0)

## 2022-12-20 NOTE — PROGRESS NOTES
Patient is 62 years old, here today for infusion of Cisplatin under the orders of Dr Griffin.      Home medications, allergies, vitals, fall risk and [facility]. All reviewed by this nurse prior to treating.      Component      Latest Ref Rng & Units 12/20/2022   WBC      4.0 - 11.0 10e3/uL 5.8   RBC Count      4.40 - 5.90 10e6/uL 4.94   Hemoglobin      13.3 - 17.7 g/dL 15.5   Hematocrit      40.0 - 53.0 % 43.6   MCV      78 - 100 fL 88   MCH      26.5 - 33.0 pg 31.4   MCHC      31.5 - 36.5 g/dL 35.6   RDW      10.0 - 15.0 % 12.0   Platelet Count      150 - 450 10e3/uL 226   % Neutrophils      % 65   % Lymphocytes      % 21   % Monocytes      % 10   % Eosinophils      % 2   % Basophils      % 1   % Immature Granulocytes      % 1   NRBCs per 100 WBC      <1 /100 0   Absolute Neutrophils      1.6 - 8.3 10e3/uL 3.8   Absolute Lymphocytes      0.8 - 5.3 10e3/uL 1.2   Absolute Monocytes      0.0 - 1.3 10e3/uL 0.6   Absolute Eosinophils      0.0 - 0.7 10e3/uL 0.1   Absolute Basophils      0.0 - 0.2 10e3/uL 0.0   Absolute Immature Granulocytes      <=0.4 10e3/uL 0.0   Absolute NRBCs      10e3/uL 0.0   Sodium      136 - 145 mmol/L 137   Potassium      3.4 - 5.3 mmol/L 4.4   Chloride      98 - 107 mmol/L 100   Carbon Dioxide (CO2)      22 - 29 mmol/L 26   Anion Gap      7 - 15 mmol/L 11   Urea Nitrogen      8.0 - 23.0 mg/dL 19.8   Creatinine      0.67 - 1.17 mg/dL 0.94   Calcium      8.8 - 10.2 mg/dL 9.2   Glucose      70 - 99 mg/dL 89   Alkaline Phosphatase      40 - 129 U/L 69   AST      10 - 50 U/L 26   ALT      10 - 50 U/L 26   Protein Total      6.4 - 8.3 g/dL 7.2   Albumin      3.5 - 5.2 g/dL 3.9   Bilirubin Total      <=1.2 mg/dL 0.5   GFR Estimate      >60 mL/min/1.73m2 >90   Magnesium      1.7 - 2.3 mg/dL 2.2       Patient meets parameters for today's infusion.  Denies questions or concerns regarding today's infusion and/or medications being administered.      Call to   [place], as pharmacy noted concern today over fluconazole and emend concurrent administration, and emend still in treatment plan. Per MD, she spoke with pharmacy and lowered dose. Plan reflects. Patient aware and ok with proceeding.       Patient identified with two identifiers, order verified, and verbal consent for today's infusion obtained from patient.     1143 IV pump verified with Cisplatin dose, drug, and rate of administration. Infusion administered per protocol. Patient tolerated infusion well, no signs or symptoms of adverse reaction noted. Patient denies pain nor discomfort.     Printed and given ONC AVS at their request.     Needle removed, tip intact. Site clean, dry and intact. Covered with a sterile bandage, slight pressure applied for 30 seconds. Pt instructed to leave bandage intact for a minimum of one hour, and to call with questions or concerns. Patient states understanding, discharged.

## 2022-12-20 NOTE — PROGRESS NOTES
Patients power port accessed using non-coring, 19 gauge, 3/4 inch needle.    Mask donned by caregiver: yes Site prepped with CHG: yes Labs drawn: yes Dressing applied using aseptic technique: yes.     Port accessed per facility protocol. Port flushed easily, blood return noted.  No signs and symptoms of infection or infiltration.  Port flushed with 10mL normal saline, blood return noted, 10 mLs blood discarded. Blood taken for ordered labs, port flushed with 20mL normal saline.  Port left accessed as patient has appointment with Dr. Pruitt, and is then due for chemo if parameters are met.  Patient discharged with no complaints.

## 2022-12-20 NOTE — PROGRESS NOTES
MEDICAL ONCOLOGY FOLLOW UP NOTE  Dec 20, 2022    Reason for referral: Head and neck cancer.     Referring Provider: Dr. Houston    HISTORY OF PRESENT ILLNESS  Melchor Sadler is a 62 year old male with PMH as stated below who is seen in the oncology clinic for consultation of his head and neck cancer.     His history in short is as follows:    Mr. Sadler initially felt a swelling in his neck 1 year ago. It was monitored. He then underwent a CT scan when he was found to have laceration of the scalp.    8/23/2022: CT soft tissue neck mass: 3.8 cm probable everardo mass in the left jugulodigastric chain. Adjacent also enlarged 2.3 cm long axis node. Differential considerations favor  metastatic disease. A mucosal mass is questioned at the roof of the left tonsillar pillar.    He was evaluated by Dr. 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     Recommended PET scan and referral to radiation oncology and medical oncology for discussion regarding concurrent chemoRT.     10/26/2022: Radiation oncology consultation with recommendations for concurrent chemoRT.     11/2/2022: PET scan: IMPRESSION: Large left anterior cervical chain lymph node metastasismeasuring 4.0 x 3.5 cm in dimension. There are a few mildly enlarged  adjacent left anterior cervical chain lymph nodes with uptake not significantly greater than that of background uptake. No evidence of  distant metastatic disease otherwise.    11/29/2022: started chemotherapy with concurrent RT.    Interim history     Mr. Sadler is doing well.He was seen in the ED on 12/13/2022 and was found to be RSV positive. Started on o2 and antibiotics. Previously had difficulty sleepy with erythromycin. He feels like he has not been sleepy very well. Also treated fro thrush which has improved. He denies any difficulty swallowing or mouth pain right now. Feels like  the left neck mass has decreased in size.     REVIEW OF SYSTEMS  A 12-point ROS negative except as in HPI      Current Outpatient Medications   Medication Sig Dispense Refill     cephALEXin (KEFLEX) 500 MG capsule Take 1 capsule (500 mg) by mouth 4 times daily for 7 days 28 capsule 0     dexamethasone (DECADRON) 4 MG tablet Take 2 tablets (8 mg) by mouth daily Take for 3 days, starting the day after chemo on day 2 and 9. Take with food. If no nausea and vomiting with first cisplatin doses, may stop dexamethasone. 12 tablet 2     doxycycline monohydrate (ADOXA) 75 MG tablet Take 1 tablet (75 mg) by mouth daily for 7 days 7 tablet 0     fluconazole (DIFLUCAN) 100 MG tablet Take 1 tablet (100 mg) by mouth daily for 15 days 15 tablet 0     magic mouthwash (ENTER INGREDIENTS IN COMMENTS) suspension Swish, gargle, and spit one to two teaspoonfuls every six hours as needed. May be swallowed if esophageal involvement. 240 mL 3     magnesium oxide (MAG-OX) 400 MG tablet Take 400 mg by mouth daily       NONFORMULARY Bone broth       nystatin (MYCOSTATIN) 590752 UNIT/ML suspension Take 5 mLs (500,000 Units) by mouth 4 times daily for 10 days 200 mL 0     ondansetron (ZOFRAN) 8 MG tablet Take 1 tablet (8 mg) by mouth every 8 hours as needed for nausea (vomiting) (Patient not taking: Reported on 12/6/2022) 30 tablet 2     prochlorperazine (COMPAZINE) 10 MG tablet Take 1 tablet (10 mg) by mouth every 6 hours as needed for nausea or vomiting (Patient not taking: Reported on 12/6/2022) 30 tablet 2     UNKNOWN TO PATIENT Vitamin D, unsure of dose       zinc gluconate 50 MG tablet Take 50 mg by mouth daily         Allergies   Allergen Reactions     Codeine Other (See Comments)     Anxiety     Penicillins Anxiety     Pt states his sister and father are allergic, but he doesn't believe he is. 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 Jamey Houston       SOCIAL HISTORY  History   Smoking Status     Never   Smokeless Tobacco     Former     Types: Chew     Quit date: 1995    Social History    Substance and Sexual Activity      Alcohol use: Not Currently     History   Drug Use No     Comment: Drug use: No       FAMILY HISTORY  Family History   Problem Relation Age of Onset     Lung Cancer Mother      Cancer Mother      Diabetes Father      Colon Cancer Maternal Grandmother      Hyperlipidemia No family hx of      Coronary Artery Disease No family hx of      Hypertension No family hx of      Breast Cancer No family hx of      Prostate Cancer No family hx of      Depression No family hx of      Anxiety Disorder No family hx of      Mental Illness No family hx of      Anesthesia Reaction No family hx of      Asthma No family hx of        PHYSICAL EXAMINATION  There were no vitals taken for this visit.  Wt Readings from Last 2 Encounters:   12/13/22 106.6 kg (235 lb)   12/13/22 107.7 kg (237 lb 8 oz)     Physical Exam  Constitutional:       Appearance: He is obese.   HENT:      Mouth/Throat:      Mouth: Mucous membranes are moist.   Eyes:      Conjunctiva/sclera: Conjunctivae normal.   Lymphadenopathy:      Cervical: Cervical adenopathy present.      Left cervical: Superficial cervical adenopathy present.      Comments: 3 cm mass present under chin over superior left neck.    Neurological:      Mental Status: He is alert.     LABORATORY AND IMAGING     Latest  Reference Range & Units 09/15/22 16:04   Sodium 134 - 144 mmol/L 137   Potassium 3.5 - 5.1 mmol/L 4.0   Chloride 98 - 107 mmol/L 103   Carbon Dioxide 21 - 31 mmol/L 27   Urea Nitrogen 7 - 25 mg/dL 17   Creatinine 0.70 - 1.30 mg/dL 0.86       ASSESSMENT AND PLAN    1. 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     Referred by ENT for discussion regarding concurrent chemoRT. Has met with radiation oncology. PET scan did not show any distant disease.     Discussed with Mr. Sadler and his wife regarding addition of chemotherapy with concurrent RT. I would recommend weekly cisplatin 40 mg weekly for the course of RT.    Currently on concurrent chemoRT since 11/29.. Had a treatment break last week as he had RSV and is on antibiotics. He also had thrush and is on Diflucan. Symptoms have improved. Discussed restarting treatment which he is agreeable. Restart weekly cisplatin day 22. Discussed adding weekly hydration with treatment.     2. RSV pneumonia.     He is doing well. Completing antibiotics. However having difficulty sleeping which he has had in the past. Also had panic. 2 doses of lorazepam prescribed. Use as needed.       Follow up weekly with weekly cisplatin.     Total time spent on the patient was 30 minutes which included time spent reviewing lab results, performing history and physical, reviewing imaging results with the patient, documenting history and physical and ordering treatment.     Ching Pruitt MD

## 2022-12-20 NOTE — PROGRESS NOTES
Call to [facility], asking if they are able to access patient port down in ONC due to late check in. Agreed.

## 2022-12-21 ENCOUNTER — RESULTS ONLY (OUTPATIENT)
Dept: RADIATION ONCOLOGY | Facility: HOSPITAL | Age: 62
End: 2022-12-21

## 2022-12-21 ENCOUNTER — APPOINTMENT (OUTPATIENT)
Dept: RADIATION ONCOLOGY | Facility: HOSPITAL | Age: 62
End: 2022-12-21
Payer: COMMERCIAL

## 2022-12-21 LAB
RAD ONC ARIA COURSE ID: NORMAL
RAD ONC ARIA COURSE LAST TREATMENT DATE: NORMAL
RAD ONC ARIA COURSE START DATE: NORMAL
RAD ONC ARIA COURSE TREATMENT ELAPSED DAYS: 22
RAD ONC ARIA FIRST TREATMENT DATE: NORMAL
RAD ONC ARIA PLAN FRACTIONS TREATED TO DATE: 14
RAD ONC ARIA PLAN ID: NORMAL
RAD ONC ARIA PLAN PRESCRIBED DOSE PER FRACTION: 2 GY
RAD ONC ARIA PLAN TOTAL FRACTIONS PRESCRIBED: 35
RAD ONC ARIA PLAN TOTAL PRESCRIBED DOSE: 7000 CGY
RAD ONC ARIA REFERENCE POINT DOSAGE GIVEN TO DATE: NORMAL GY
RAD ONC ARIA REFERENCE POINT DOSAGE GIVEN TO DATE: NORMAL GY
RAD ONC ARIA REFERENCE POINT ID: NORMAL
RAD ONC ARIA REFERENCE POINT ID: NORMAL

## 2022-12-21 PROCEDURE — 77014 HC CT GUIDE FOR PLACEMENT RADIATION THERAPY FIELDS: CPT | Performed by: RADIOLOGY

## 2022-12-21 PROCEDURE — 77386 HC IMRT TREATMENT DELIVERY, COMPLEX: CPT | Performed by: RADIOLOGY

## 2022-12-22 ENCOUNTER — HOSPITAL ENCOUNTER (EMERGENCY)
Facility: OTHER | Age: 62
Discharge: HOME | End: 2022-12-23
Attending: FAMILY MEDICINE | Admitting: FAMILY MEDICINE
Payer: COMMERCIAL

## 2022-12-22 ENCOUNTER — RESULTS ONLY (OUTPATIENT)
Dept: RADIATION ONCOLOGY | Facility: HOSPITAL | Age: 62
End: 2022-12-22

## 2022-12-22 ENCOUNTER — INFUSION THERAPY VISIT (OUTPATIENT)
Dept: INFUSION THERAPY | Facility: OTHER | Age: 62
End: 2022-12-22
Attending: FAMILY MEDICINE
Payer: COMMERCIAL

## 2022-12-22 ENCOUNTER — TELEPHONE (OUTPATIENT)
Dept: ONCOLOGY | Facility: OTHER | Age: 62
End: 2022-12-22

## 2022-12-22 ENCOUNTER — APPOINTMENT (OUTPATIENT)
Dept: RADIATION ONCOLOGY | Facility: HOSPITAL | Age: 62
End: 2022-12-22
Payer: COMMERCIAL

## 2022-12-22 VITALS
DIASTOLIC BLOOD PRESSURE: 92 MMHG | RESPIRATION RATE: 17 BRPM | TEMPERATURE: 98.5 F | OXYGEN SATURATION: 96 % | SYSTOLIC BLOOD PRESSURE: 149 MMHG | HEART RATE: 86 BPM

## 2022-12-22 DIAGNOSIS — C09.9 MALIGNANT NEOPLASM OF TONSIL (H): Primary | ICD-10-CM

## 2022-12-22 DIAGNOSIS — R09.02 HYPOXEMIA: ICD-10-CM

## 2022-12-22 DIAGNOSIS — Z51.11 ENCOUNTER FOR ANTINEOPLASTIC CHEMOTHERAPY: ICD-10-CM

## 2022-12-22 LAB
RAD ONC ARIA COURSE ID: NORMAL
RAD ONC ARIA COURSE LAST TREATMENT DATE: NORMAL
RAD ONC ARIA COURSE START DATE: NORMAL
RAD ONC ARIA COURSE TREATMENT ELAPSED DAYS: 23
RAD ONC ARIA FIRST TREATMENT DATE: NORMAL
RAD ONC ARIA PLAN FRACTIONS TREATED TO DATE: 15
RAD ONC ARIA PLAN ID: NORMAL
RAD ONC ARIA PLAN PRESCRIBED DOSE PER FRACTION: 2 GY
RAD ONC ARIA PLAN TOTAL FRACTIONS PRESCRIBED: 35
RAD ONC ARIA PLAN TOTAL PRESCRIBED DOSE: 7000 CGY
RAD ONC ARIA REFERENCE POINT DOSAGE GIVEN TO DATE: NORMAL GY
RAD ONC ARIA REFERENCE POINT DOSAGE GIVEN TO DATE: NORMAL GY
RAD ONC ARIA REFERENCE POINT ID: NORMAL
RAD ONC ARIA REFERENCE POINT ID: NORMAL

## 2022-12-22 PROCEDURE — 99284 EMERGENCY DEPT VISIT MOD MDM: CPT | Mod: 25 | Performed by: FAMILY MEDICINE

## 2022-12-22 PROCEDURE — 96523 IRRIG DRUG DELIVERY DEVICE: CPT | Performed by: INTERNAL MEDICINE

## 2022-12-22 PROCEDURE — 77014 HC CT GUIDE FOR PLACEMENT RADIATION THERAPY FIELDS: CPT | Performed by: RADIOLOGY

## 2022-12-22 PROCEDURE — 99283 EMERGENCY DEPT VISIT LOW MDM: CPT | Performed by: FAMILY MEDICINE

## 2022-12-22 PROCEDURE — 77386 HC IMRT TREATMENT DELIVERY, COMPLEX: CPT | Performed by: RADIOLOGY

## 2022-12-22 PROCEDURE — 77336 RADIATION PHYSICS CONSULT: CPT | Performed by: RADIOLOGY

## 2022-12-22 RX ORDER — HEPARIN SODIUM (PORCINE) LOCK FLUSH IV SOLN 100 UNIT/ML 100 UNIT/ML
5 SOLUTION INTRAVENOUS
Status: CANCELLED | OUTPATIENT
Start: 2022-12-22

## 2022-12-22 RX ORDER — HEPARIN SODIUM (PORCINE) LOCK FLUSH IV SOLN 100 UNIT/ML 100 UNIT/ML
5 SOLUTION INTRAVENOUS
Status: DISCONTINUED | OUTPATIENT
Start: 2022-12-22 | End: 2022-12-22 | Stop reason: HOSPADM

## 2022-12-22 RX ADMIN — HEPARIN SODIUM (PORCINE) LOCK FLUSH IV SOLN 100 UNIT/ML 5 ML: 100 SOLUTION at 15:25

## 2022-12-22 RX ADMIN — Medication 1000 ML: at 14:13

## 2022-12-22 NOTE — TELEPHONE ENCOUNTER
Pt spouse calling questioning if Melchor's o2 sat normal as when supine his oximeter reads 88-89 but when fowlers it is 94-95. Questioning whether or not to go in to urgent care advised CC she will do assessment.

## 2022-12-23 ENCOUNTER — APPOINTMENT (OUTPATIENT)
Dept: GENERAL RADIOLOGY | Facility: OTHER | Age: 62
End: 2022-12-23
Attending: FAMILY MEDICINE
Payer: COMMERCIAL

## 2022-12-23 ENCOUNTER — RESULTS ONLY (OUTPATIENT)
Dept: RADIATION ONCOLOGY | Facility: HOSPITAL | Age: 62
End: 2022-12-23

## 2022-12-23 VITALS
HEART RATE: 65 BPM | TEMPERATURE: 98.2 F | RESPIRATION RATE: 15 BRPM | DIASTOLIC BLOOD PRESSURE: 83 MMHG | OXYGEN SATURATION: 94 % | SYSTOLIC BLOOD PRESSURE: 134 MMHG

## 2022-12-23 LAB
ANION GAP SERPL CALCULATED.3IONS-SCNC: 10 MMOL/L (ref 7–15)
BASOPHILS # BLD AUTO: 0 10E3/UL (ref 0–0.2)
BASOPHILS NFR BLD AUTO: 1 %
BUN SERPL-MCNC: 18 MG/DL (ref 8–23)
CALCIUM SERPL-MCNC: 9.7 MG/DL (ref 8.8–10.2)
CHLORIDE SERPL-SCNC: 100 MMOL/L (ref 98–107)
CREAT SERPL-MCNC: 0.78 MG/DL (ref 0.67–1.17)
DEPRECATED HCO3 PLAS-SCNC: 26 MMOL/L (ref 22–29)
EOSINOPHIL # BLD AUTO: 0.1 10E3/UL (ref 0–0.7)
EOSINOPHIL NFR BLD AUTO: 2 %
ERYTHROCYTE [DISTWIDTH] IN BLOOD BY AUTOMATED COUNT: 12.1 % (ref 10–15)
GFR SERPL CREATININE-BSD FRML MDRD: >90 ML/MIN/1.73M2
GLUCOSE SERPL-MCNC: 99 MG/DL (ref 70–99)
HCT VFR BLD AUTO: 40.1 % (ref 40–53)
HGB BLD-MCNC: 14.3 G/DL (ref 13.3–17.7)
HOLD SPECIMEN: NORMAL
IMM GRANULOCYTES # BLD: 0 10E3/UL
IMM GRANULOCYTES NFR BLD: 0 %
LYMPHOCYTES # BLD AUTO: 0.8 10E3/UL (ref 0.8–5.3)
LYMPHOCYTES NFR BLD AUTO: 19 %
MCH RBC QN AUTO: 31.4 PG (ref 26.5–33)
MCHC RBC AUTO-ENTMCNC: 35.7 G/DL (ref 31.5–36.5)
MCV RBC AUTO: 88 FL (ref 78–100)
MONOCYTES # BLD AUTO: 0.5 10E3/UL (ref 0–1.3)
MONOCYTES NFR BLD AUTO: 12 %
NEUTROPHILS # BLD AUTO: 2.6 10E3/UL (ref 1.6–8.3)
NEUTROPHILS NFR BLD AUTO: 66 %
NRBC # BLD AUTO: 0 10E3/UL
NRBC BLD AUTO-RTO: 0 /100
PLATELET # BLD AUTO: 202 10E3/UL (ref 150–450)
POTASSIUM SERPL-SCNC: 3.8 MMOL/L (ref 3.4–5.3)
RAD ONC ARIA COURSE ID: NORMAL
RAD ONC ARIA COURSE LAST TREATMENT DATE: NORMAL
RAD ONC ARIA COURSE START DATE: NORMAL
RAD ONC ARIA COURSE TREATMENT ELAPSED DAYS: 24
RAD ONC ARIA FIRST TREATMENT DATE: NORMAL
RAD ONC ARIA PLAN FRACTIONS TREATED TO DATE: 16
RAD ONC ARIA PLAN ID: NORMAL
RAD ONC ARIA PLAN PRESCRIBED DOSE PER FRACTION: 2 GY
RAD ONC ARIA PLAN TOTAL FRACTIONS PRESCRIBED: 35
RAD ONC ARIA PLAN TOTAL PRESCRIBED DOSE: 7000 CGY
RAD ONC ARIA REFERENCE POINT DOSAGE GIVEN TO DATE: NORMAL GY
RAD ONC ARIA REFERENCE POINT DOSAGE GIVEN TO DATE: NORMAL GY
RAD ONC ARIA REFERENCE POINT ID: NORMAL
RAD ONC ARIA REFERENCE POINT ID: NORMAL
RBC # BLD AUTO: 4.55 10E6/UL (ref 4.4–5.9)
SODIUM SERPL-SCNC: 136 MMOL/L (ref 136–145)
WBC # BLD AUTO: 4 10E3/UL (ref 4–11)

## 2022-12-23 PROCEDURE — 36415 COLL VENOUS BLD VENIPUNCTURE: CPT | Performed by: FAMILY MEDICINE

## 2022-12-23 PROCEDURE — 80048 BASIC METABOLIC PNL TOTAL CA: CPT | Performed by: FAMILY MEDICINE

## 2022-12-23 PROCEDURE — 85025 COMPLETE CBC W/AUTO DIFF WBC: CPT | Performed by: FAMILY MEDICINE

## 2022-12-23 PROCEDURE — 71045 X-RAY EXAM CHEST 1 VIEW: CPT | Mod: TC

## 2022-12-23 ASSESSMENT — ACTIVITIES OF DAILY LIVING (ADL)
ADLS_ACUITY_SCORE: 35
ADLS_ACUITY_SCORE: 35

## 2022-12-23 NOTE — ED TRIAGE NOTES
Pt arrives via private vehicle with c/o trying to sleep tonight, becoming SOB and stating his oxygen dropped down to 76%. States he sat back up, took some deep breaths and oxygen went back up. Pt appears calm, cooperative, and in no distress at this time.     Triage Assessment     Row Name 12/22/22 2329       Triage Assessment (Adult)    Airway WDL WDL       Respiratory WDL    Respiratory WDL WDL       Skin Circulation/Temperature WDL    Skin Circulation/Temperature WDL WDL       Cardiac WDL    Cardiac WDL WDL       Peripheral/Neurovascular WDL    Peripheral Neurovascular WDL WDL

## 2022-12-23 NOTE — ED NOTES
Patient currently on 1.5L NC.  He did desaturate to 85% while sleeping.  O2 increased to 2L.  While awake, O2 is 95%.  Will continue to monitor.
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
## 2022-12-27 ENCOUNTER — RESULTS ONLY (OUTPATIENT)
Dept: RADIATION ONCOLOGY | Facility: HOSPITAL | Age: 62
End: 2022-12-27

## 2022-12-27 ENCOUNTER — INFUSION THERAPY VISIT (OUTPATIENT)
Dept: INFUSION THERAPY | Facility: OTHER | Age: 62
End: 2022-12-27
Attending: NURSE PRACTITIONER
Payer: COMMERCIAL

## 2022-12-27 ENCOUNTER — ONCOLOGY VISIT (OUTPATIENT)
Dept: ONCOLOGY | Facility: OTHER | Age: 62
End: 2022-12-27
Attending: INTERNAL MEDICINE
Payer: COMMERCIAL

## 2022-12-27 ENCOUNTER — APPOINTMENT (OUTPATIENT)
Dept: RADIATION ONCOLOGY | Facility: HOSPITAL | Age: 62
End: 2022-12-27
Payer: COMMERCIAL

## 2022-12-27 VITALS
HEIGHT: 69 IN | HEART RATE: 77 BPM | OXYGEN SATURATION: 94 % | BODY MASS INDEX: 32.82 KG/M2 | RESPIRATION RATE: 20 BRPM | SYSTOLIC BLOOD PRESSURE: 130 MMHG | TEMPERATURE: 98.3 F | DIASTOLIC BLOOD PRESSURE: 80 MMHG | WEIGHT: 221.56 LBS

## 2022-12-27 VITALS — SYSTOLIC BLOOD PRESSURE: 147 MMHG | HEART RATE: 73 BPM | DIASTOLIC BLOOD PRESSURE: 93 MMHG

## 2022-12-27 DIAGNOSIS — C09.9 MALIGNANT NEOPLASM OF TONSIL (H): ICD-10-CM

## 2022-12-27 DIAGNOSIS — Z51.11 ENCOUNTER FOR ANTINEOPLASTIC CHEMOTHERAPY: Primary | ICD-10-CM

## 2022-12-27 DIAGNOSIS — Z51.11 ENCOUNTER FOR ANTINEOPLASTIC CHEMOTHERAPY: ICD-10-CM

## 2022-12-27 DIAGNOSIS — C09.9 MALIGNANT NEOPLASM OF TONSIL (H): Primary | ICD-10-CM

## 2022-12-27 LAB
ALBUMIN SERPL BCG-MCNC: 4 G/DL (ref 3.5–5.2)
ALP SERPL-CCNC: 72 U/L (ref 40–129)
ALT SERPL W P-5'-P-CCNC: 37 U/L (ref 10–50)
ANION GAP SERPL CALCULATED.3IONS-SCNC: 10 MMOL/L (ref 7–15)
AST SERPL W P-5'-P-CCNC: 33 U/L (ref 10–50)
BASOPHILS # BLD AUTO: 0 10E3/UL (ref 0–0.2)
BASOPHILS NFR BLD AUTO: 1 %
BILIRUB SERPL-MCNC: 0.4 MG/DL
BUN SERPL-MCNC: 14.7 MG/DL (ref 8–23)
CALCIUM SERPL-MCNC: 9 MG/DL (ref 8.8–10.2)
CHLORIDE SERPL-SCNC: 101 MMOL/L (ref 98–107)
CREAT SERPL-MCNC: 0.8 MG/DL (ref 0.67–1.17)
DEPRECATED HCO3 PLAS-SCNC: 26 MMOL/L (ref 22–29)
EOSINOPHIL # BLD AUTO: 0.1 10E3/UL (ref 0–0.7)
EOSINOPHIL NFR BLD AUTO: 2 %
ERYTHROCYTE [DISTWIDTH] IN BLOOD BY AUTOMATED COUNT: 12.1 % (ref 10–15)
GFR SERPL CREATININE-BSD FRML MDRD: >90 ML/MIN/1.73M2
GLUCOSE SERPL-MCNC: 98 MG/DL (ref 70–99)
HCT VFR BLD AUTO: 40 % (ref 40–53)
HGB BLD-MCNC: 14.4 G/DL (ref 13.3–17.7)
IMM GRANULOCYTES # BLD: 0 10E3/UL
IMM GRANULOCYTES NFR BLD: 1 %
LYMPHOCYTES # BLD AUTO: 0.8 10E3/UL (ref 0.8–5.3)
LYMPHOCYTES NFR BLD AUTO: 18 %
MAGNESIUM SERPL-MCNC: 2 MG/DL (ref 1.7–2.3)
MCH RBC QN AUTO: 31.6 PG (ref 26.5–33)
MCHC RBC AUTO-ENTMCNC: 36 G/DL (ref 31.5–36.5)
MCV RBC AUTO: 88 FL (ref 78–100)
MONOCYTES # BLD AUTO: 0.4 10E3/UL (ref 0–1.3)
MONOCYTES NFR BLD AUTO: 10 %
NEUTROPHILS # BLD AUTO: 2.9 10E3/UL (ref 1.6–8.3)
NEUTROPHILS NFR BLD AUTO: 68 %
NRBC # BLD AUTO: 0 10E3/UL
NRBC BLD AUTO-RTO: 0 /100
PLATELET # BLD AUTO: 204 10E3/UL (ref 150–450)
POTASSIUM SERPL-SCNC: 4 MMOL/L (ref 3.4–5.3)
PROT SERPL-MCNC: 6.7 G/DL (ref 6.4–8.3)
RAD ONC ARIA COURSE ID: NORMAL
RAD ONC ARIA COURSE LAST TREATMENT DATE: NORMAL
RAD ONC ARIA COURSE START DATE: NORMAL
RAD ONC ARIA COURSE TREATMENT ELAPSED DAYS: 28
RAD ONC ARIA FIRST TREATMENT DATE: NORMAL
RAD ONC ARIA PLAN FRACTIONS TREATED TO DATE: 17
RAD ONC ARIA PLAN ID: NORMAL
RAD ONC ARIA PLAN PRESCRIBED DOSE PER FRACTION: 2 GY
RAD ONC ARIA PLAN TOTAL FRACTIONS PRESCRIBED: 35
RAD ONC ARIA PLAN TOTAL PRESCRIBED DOSE: 7000 CGY
RAD ONC ARIA REFERENCE POINT DOSAGE GIVEN TO DATE: NORMAL GY
RAD ONC ARIA REFERENCE POINT DOSAGE GIVEN TO DATE: NORMAL GY
RAD ONC ARIA REFERENCE POINT ID: NORMAL
RAD ONC ARIA REFERENCE POINT ID: NORMAL
RBC # BLD AUTO: 4.55 10E6/UL (ref 4.4–5.9)
SODIUM SERPL-SCNC: 137 MMOL/L (ref 136–145)
WBC # BLD AUTO: 4.2 10E3/UL (ref 4–11)

## 2022-12-27 PROCEDURE — 77014 PR CT GUIDE FOR PLACEMENT RADIATION THERAPY FIELDS: CPT | Mod: 26 | Performed by: RADIOLOGY

## 2022-12-27 PROCEDURE — 36591 DRAW BLOOD OFF VENOUS DEVICE: CPT | Performed by: INTERNAL MEDICINE

## 2022-12-27 PROCEDURE — 99215 OFFICE O/P EST HI 40 MIN: CPT | Mod: 25 | Performed by: INTERNAL MEDICINE

## 2022-12-27 PROCEDURE — 96413 CHEMO IV INFUSION 1 HR: CPT | Performed by: INTERNAL MEDICINE

## 2022-12-27 PROCEDURE — 77386 HC IMRT TREATMENT DELIVERY, COMPLEX: CPT | Performed by: RADIOLOGY

## 2022-12-27 PROCEDURE — 83735 ASSAY OF MAGNESIUM: CPT | Performed by: INTERNAL MEDICINE

## 2022-12-27 PROCEDURE — 80053 COMPREHEN METABOLIC PANEL: CPT | Performed by: INTERNAL MEDICINE

## 2022-12-27 PROCEDURE — 77014 HC CT GUIDE FOR PLACEMENT RADIATION THERAPY FIELDS: CPT | Performed by: RADIOLOGY

## 2022-12-27 PROCEDURE — 96375 TX/PRO/DX INJ NEW DRUG ADDON: CPT | Performed by: INTERNAL MEDICINE

## 2022-12-27 PROCEDURE — 96367 TX/PROPH/DG ADDL SEQ IV INF: CPT | Performed by: INTERNAL MEDICINE

## 2022-12-27 PROCEDURE — 85025 COMPLETE CBC W/AUTO DIFF WBC: CPT | Performed by: INTERNAL MEDICINE

## 2022-12-27 RX ORDER — MEPERIDINE HYDROCHLORIDE 25 MG/ML
25 INJECTION INTRAMUSCULAR; INTRAVENOUS; SUBCUTANEOUS EVERY 30 MIN PRN
Status: CANCELLED | OUTPATIENT
Start: 2022-12-27

## 2022-12-27 RX ORDER — NYSTATIN 100000/ML
500000 SUSPENSION, ORAL (FINAL DOSE FORM) ORAL 4 TIMES DAILY
Qty: 400 ML | Refills: 0 | Status: SHIPPED | OUTPATIENT
Start: 2022-12-27 | End: 2023-01-09

## 2022-12-27 RX ORDER — HEPARIN SODIUM,PORCINE 10 UNIT/ML
5 VIAL (ML) INTRAVENOUS
Status: CANCELLED | OUTPATIENT
Start: 2022-12-27

## 2022-12-27 RX ORDER — ALBUTEROL SULFATE 90 UG/1
1-2 AEROSOL, METERED RESPIRATORY (INHALATION)
Status: CANCELLED
Start: 2022-12-27

## 2022-12-27 RX ORDER — ALBUTEROL SULFATE 0.83 MG/ML
2.5 SOLUTION RESPIRATORY (INHALATION)
Status: CANCELLED | OUTPATIENT
Start: 2022-12-27

## 2022-12-27 RX ORDER — EPINEPHRINE 1 MG/ML
0.3 INJECTION, SOLUTION, CONCENTRATE INTRAVENOUS EVERY 5 MIN PRN
Status: CANCELLED | OUTPATIENT
Start: 2022-12-27

## 2022-12-27 RX ORDER — HEPARIN SODIUM (PORCINE) LOCK FLUSH IV SOLN 100 UNIT/ML 100 UNIT/ML
5 SOLUTION INTRAVENOUS
Status: CANCELLED | OUTPATIENT
Start: 2022-12-27

## 2022-12-27 RX ORDER — METHYLPREDNISOLONE SODIUM SUCCINATE 125 MG/2ML
125 INJECTION, POWDER, LYOPHILIZED, FOR SOLUTION INTRAMUSCULAR; INTRAVENOUS
Status: CANCELLED
Start: 2022-12-27

## 2022-12-27 RX ORDER — LORAZEPAM 2 MG/ML
0.5 INJECTION INTRAMUSCULAR EVERY 4 HOURS PRN
Status: CANCELLED | OUTPATIENT
Start: 2022-12-27

## 2022-12-27 RX ORDER — HEPARIN SODIUM (PORCINE) LOCK FLUSH IV SOLN 100 UNIT/ML 100 UNIT/ML
5 SOLUTION INTRAVENOUS
Status: DISCONTINUED | OUTPATIENT
Start: 2022-12-27 | End: 2022-12-27 | Stop reason: HOSPADM

## 2022-12-27 RX ORDER — PALONOSETRON 0.05 MG/ML
0.25 INJECTION, SOLUTION INTRAVENOUS ONCE
Status: COMPLETED | OUTPATIENT
Start: 2022-12-27 | End: 2022-12-27

## 2022-12-27 RX ORDER — DIPHENHYDRAMINE HYDROCHLORIDE 50 MG/ML
50 INJECTION INTRAMUSCULAR; INTRAVENOUS
Status: CANCELLED
Start: 2022-12-27

## 2022-12-27 RX ADMIN — Medication 1000 ML: at 10:24

## 2022-12-27 RX ADMIN — PALONOSETRON 0.25 MG: 0.05 INJECTION, SOLUTION INTRAVENOUS at 10:28

## 2022-12-27 RX ADMIN — HEPARIN SODIUM (PORCINE) LOCK FLUSH IV SOLN 100 UNIT/ML 5 ML: 100 SOLUTION at 13:08

## 2022-12-27 ASSESSMENT — PAIN SCALES - GENERAL: PAINLEVEL: MILD PAIN (3)

## 2022-12-27 NOTE — NURSING NOTE
"Oncology Rooming Note    December 27, 2022 8:48 AM   Melchor Sadler is a 62 year old male who presents for:    Chief Complaint   Patient presents with     Oncology Clinic Visit     Follow up Malignant neoplasm of tonsil      Initial Vitals: /80   Pulse 77   Temp 98.3  F (36.8  C) (Tympanic)   Resp 20   Ht 1.753 m (5' 9\")   Wt 100.5 kg (221 lb 9 oz)   SpO2 94%   BMI 32.72 kg/m   Estimated body mass index is 32.72 kg/m  as calculated from the following:    Height as of this encounter: 1.753 m (5' 9\").    Weight as of this encounter: 100.5 kg (221 lb 9 oz). Body surface area is 2.21 meters squared.  Mild Pain (3) Comment: Data Unavailable   No LMP for male patient.  Allergies reviewed: Yes  Medications reviewed: Yes    Medications: Medication refills not needed today.  Pharmacy name entered into [employer]: [facility] - [address] URBAN Hernandez LPN            "

## 2022-12-27 NOTE — PROGRESS NOTES
Patient is a 62 year old here accompanied by s/o today for infusion of Cisplatin under the orders of Dr. Pruitt.     Component      Latest Ref Rng & Units 12/27/2022   WBC      4.0 - 11.0 10e3/uL 4.2   RBC Count      4.40 - 5.90 10e6/uL 4.55   Hemoglobin      13.3 - 17.7 g/dL 14.4   Hematocrit      40.0 - 53.0 % 40.0   MCV      78 - 100 fL 88   MCH      26.5 - 33.0 pg 31.6   MCHC      31.5 - 36.5 g/dL 36.0   RDW      10.0 - 15.0 % 12.1   Platelet Count      150 - 450 10e3/uL 204   % Neutrophils      % 68   % Lymphocytes      % 18   % Monocytes      % 10   % Eosinophils      % 2   % Basophils      % 1   % Immature Granulocytes      % 1   NRBCs per 100 WBC      <1 /100 0   Absolute Neutrophils      1.6 - 8.3 10e3/uL 2.9   Absolute Lymphocytes      0.8 - 5.3 10e3/uL 0.8   Absolute Monocytes      0.0 - 1.3 10e3/uL 0.4   Absolute Eosinophils      0.0 - 0.7 10e3/uL 0.1   Absolute Basophils      0.0 - 0.2 10e3/uL 0.0   Absolute Immature Granulocytes      <=0.4 10e3/uL 0.0   Absolute NRBCs      10e3/uL 0.0   Sodium      136 - 145 mmol/L 137   Potassium      3.4 - 5.3 mmol/L 4.0   Chloride      98 - 107 mmol/L 101   Carbon Dioxide (CO2)      22 - 29 mmol/L 26   Anion Gap      7 - 15 mmol/L 10   Urea Nitrogen      8.0 - 23.0 mg/dL 14.7   Creatinine      0.67 - 1.17 mg/dL 0.80   Calcium      8.8 - 10.2 mg/dL 9.0   Glucose      70 - 99 mg/dL 98   Alkaline Phosphatase      40 - 129 U/L 72   AST      10 - 50 U/L 33   ALT      10 - 50 U/L 37   Protein Total      6.4 - 8.3 g/dL 6.7   Albumin      3.5 - 5.2 g/dL 4.0   Bilirubin Total      <=1.2 mg/dL 0.4   GFR Estimate      >60 mL/min/1.73m2 >90   Magnesium      1.7 - 2.3 mg/dL 2.0       Cisplatin dose(s) verified with Deb J, RN prior to release of drug.    Patient meets parameters for today's infusion.  Denies questions or concerns regarding today's infusion and/or medications being administered.      Patient identified with two identifiers, order verified, and verbal consent for  today's infusion obtained from patient.    1153 IV pump verified with Cisplatin dose, drug, and rate of administration. Infusion administered per protocol. Patient tolerated infusion well, no signs or symptoms of adverse reaction noted. Patient denies pain nor discomfort.     Needle removed, tip intact. Site clean, dry and intact. Covered with a sterile bandage, slight pressure applied for 30 seconds. Pt instructed to leave bandage intact for a minimum of one hour, and to call with questions or concerns. Copy of appointments, discharge instructions, and after visit summary (AVS) provided to patient. Patient states understanding, discharged ambulatory.

## 2022-12-27 NOTE — Clinical Note
Hi Mr. Sadler states that his insurance had cancelled some of his cisplatin infusion. Can we find out why that is happening. He is on the schedule for infusion today but not for the next few weeks.

## 2022-12-27 NOTE — PATIENT INSTRUCTIONS
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
## 2022-12-28 ENCOUNTER — RESULTS ONLY (OUTPATIENT)
Dept: RADIATION ONCOLOGY | Facility: HOSPITAL | Age: 62
End: 2022-12-28

## 2022-12-28 ENCOUNTER — OFFICE VISIT (OUTPATIENT)
Dept: RADIATION ONCOLOGY | Facility: HOSPITAL | Age: 62
End: 2022-12-28
Payer: COMMERCIAL

## 2022-12-28 VITALS
SYSTOLIC BLOOD PRESSURE: 136 MMHG | RESPIRATION RATE: 19 BRPM | OXYGEN SATURATION: 97 % | TEMPERATURE: 98.7 F | BODY MASS INDEX: 33.18 KG/M2 | WEIGHT: 224.7 LBS | HEART RATE: 73 BPM | DIASTOLIC BLOOD PRESSURE: 72 MMHG

## 2022-12-28 DIAGNOSIS — C09.9 MALIGNANT NEOPLASM OF TONSIL (H): Primary | ICD-10-CM

## 2022-12-28 LAB
RAD ONC ARIA COURSE ID: NORMAL
RAD ONC ARIA COURSE LAST TREATMENT DATE: NORMAL
RAD ONC ARIA COURSE START DATE: NORMAL
RAD ONC ARIA COURSE TREATMENT ELAPSED DAYS: 29
RAD ONC ARIA FIRST TREATMENT DATE: NORMAL
RAD ONC ARIA PLAN FRACTIONS TREATED TO DATE: 18
RAD ONC ARIA PLAN ID: NORMAL
RAD ONC ARIA PLAN PRESCRIBED DOSE PER FRACTION: 2 GY
RAD ONC ARIA PLAN TOTAL FRACTIONS PRESCRIBED: 35
RAD ONC ARIA PLAN TOTAL PRESCRIBED DOSE: 7000 CGY
RAD ONC ARIA REFERENCE POINT DOSAGE GIVEN TO DATE: NORMAL GY
RAD ONC ARIA REFERENCE POINT DOSAGE GIVEN TO DATE: NORMAL GY
RAD ONC ARIA REFERENCE POINT ID: NORMAL
RAD ONC ARIA REFERENCE POINT ID: NORMAL

## 2022-12-28 ASSESSMENT — PAIN SCALES - GENERAL: PAINLEVEL: NO PAIN (1)

## 2022-12-28 NOTE — PROGRESS NOTES
Progress Notes  Encounter Date: Dec 28, 2022  Jeramie Figueroa MD     RADIATION ONCOLOGY WEEKLY MANAGEMENT PROGRESS NOTE     Patient Care Team       Relationship Specialty Notifications Start End    No Ref-Primary, Physician PCP - General   11/14/22     Fax: [phone]         Maryam Cannon MD Assigned PCP   9/24/22     Phone: [phone] Fax: [phone]         [address] [place] [address]    Vicki Alaniz MD Assigned Cancer Care Provider   11/26/22     Phone: [phone] Fax: [phone]         [address] [place] [address]    Isabelle Alamo, RN Specialty Care Coordinator Hematology & Oncology Admissions 12/13/22     Ángela Contreras RN Specialty Care Coordinator Hematology & Oncology All results, Admissions 12/13/22     Phone: [phone] Fax: [phone]         [facility] [address] [place] [address]                  DIAGNOSIS:  Cancer Staging   Malignant neoplasm of tonsil (H)  Staging form: Pharynx - Oropharynx, AJCC 8th Edition  - Clinical stage from 11/16/2022: Stage I (cT1, cN1, cM0, p16+) - Signed by Vicki Alaniz MD on 11/16/2022          RADIATION THERAPY:    Melchor Sadler has received 36  Gy to date.  He is at 18 of 35 fractions..       SUBJECTIVE:    Currently He  Is having insomnia from antibiotics but has had a few good nights.  Has sore mouth reviewed by Med Onc. Good appetite.  Eating solid foods and supplements.        OBJECTIVE:    WEIGHT: 224 lbs 11.2 oz.  Examination reveals brisk erythema of neck.  Tonsil flat.  Lymph node about 1-1.5 cm and soft.      IMPRESSION:  Routine tolerance to radiation therapy.       PLAN:  Continue treatment as planned         Medical record and imaging reviewed.      Jeramie Figueroa MD

## 2022-12-29 ENCOUNTER — APPOINTMENT (OUTPATIENT)
Dept: RADIATION ONCOLOGY | Facility: HOSPITAL | Age: 62
End: 2022-12-29
Payer: COMMERCIAL

## 2022-12-29 ENCOUNTER — RESULTS ONLY (OUTPATIENT)
Dept: RADIATION ONCOLOGY | Facility: HOSPITAL | Age: 62
End: 2022-12-29

## 2022-12-29 ENCOUNTER — INFUSION THERAPY VISIT (OUTPATIENT)
Dept: INFUSION THERAPY | Facility: OTHER | Age: 62
End: 2022-12-29
Attending: INTERNAL MEDICINE
Payer: COMMERCIAL

## 2022-12-29 VITALS
DIASTOLIC BLOOD PRESSURE: 72 MMHG | SYSTOLIC BLOOD PRESSURE: 126 MMHG | RESPIRATION RATE: 18 BRPM | TEMPERATURE: 98.1 F | HEART RATE: 76 BPM | OXYGEN SATURATION: 96 %

## 2022-12-29 DIAGNOSIS — Z51.11 ENCOUNTER FOR ANTINEOPLASTIC CHEMOTHERAPY: ICD-10-CM

## 2022-12-29 DIAGNOSIS — C09.9 MALIGNANT NEOPLASM OF TONSIL (H): Primary | ICD-10-CM

## 2022-12-29 LAB
RAD ONC ARIA COURSE ID: NORMAL
RAD ONC ARIA COURSE LAST TREATMENT DATE: NORMAL
RAD ONC ARIA COURSE START DATE: NORMAL
RAD ONC ARIA COURSE TREATMENT ELAPSED DAYS: 30
RAD ONC ARIA FIRST TREATMENT DATE: NORMAL
RAD ONC ARIA PLAN FRACTIONS TREATED TO DATE: 19
RAD ONC ARIA PLAN ID: NORMAL
RAD ONC ARIA PLAN PRESCRIBED DOSE PER FRACTION: 2 GY
RAD ONC ARIA PLAN TOTAL FRACTIONS PRESCRIBED: 35
RAD ONC ARIA PLAN TOTAL PRESCRIBED DOSE: 7000 CGY
RAD ONC ARIA REFERENCE POINT DOSAGE GIVEN TO DATE: NORMAL GY
RAD ONC ARIA REFERENCE POINT DOSAGE GIVEN TO DATE: NORMAL GY
RAD ONC ARIA REFERENCE POINT ID: NORMAL
RAD ONC ARIA REFERENCE POINT ID: NORMAL

## 2022-12-29 PROCEDURE — 77014 PR CT GUIDE FOR PLACEMENT RADIATION THERAPY FIELDS: CPT | Mod: 26 | Performed by: RADIOLOGY

## 2022-12-29 PROCEDURE — 77386 HC IMRT TREATMENT DELIVERY, COMPLEX: CPT | Performed by: RADIOLOGY

## 2022-12-29 PROCEDURE — 96523 IRRIG DRUG DELIVERY DEVICE: CPT | Performed by: INTERNAL MEDICINE

## 2022-12-29 PROCEDURE — 77014 HC CT GUIDE FOR PLACEMENT RADIATION THERAPY FIELDS: CPT | Performed by: RADIOLOGY

## 2022-12-29 RX ORDER — HEPARIN SODIUM (PORCINE) LOCK FLUSH IV SOLN 100 UNIT/ML 100 UNIT/ML
5 SOLUTION INTRAVENOUS
Status: CANCELLED | OUTPATIENT
Start: 2022-12-29

## 2022-12-29 RX ORDER — HEPARIN SODIUM (PORCINE) LOCK FLUSH IV SOLN 100 UNIT/ML 100 UNIT/ML
5 SOLUTION INTRAVENOUS
Status: DISCONTINUED | OUTPATIENT
Start: 2022-12-29 | End: 2022-12-29 | Stop reason: HOSPADM

## 2022-12-29 RX ADMIN — HEPARIN SODIUM (PORCINE) LOCK FLUSH IV SOLN 100 UNIT/ML 5 ML: 100 SOLUTION at 15:08

## 2022-12-29 RX ADMIN — Medication 1000 ML: at 14:02

## 2022-12-29 NOTE — PROGRESS NOTES
Patient is a 62 year old here accompanied by son today for infusion of IVF per order of Dr Pruitt.  Patient identified with two identifiers, order verified, and verbal consent for today's infusion obtained from patient.      Patient meets order parameters for today's treatment.     Patient denies questions or concerns regarding infusion and/or medication(s) being administered.    Patients port accessed using non-coring, 19 gauge, 3/4 inch needle. Port accessed per facility protocol. Port flushed easily, blood return noted.  No signs and symptoms of infection or infiltration.      1402 IV pump verified with dose, drug, and rate of administration.  Infusion administered per protocol.  Patient tolerated infusion well, no signs or symptoms of adverse reaction noted.  Patient denies pain nor discomfort.     IV removed, catheter intact.  Site clean, dry and intact.  No signs or symptoms of infiltration or infection.  Covered with a sterile bandage, slight pressure applied for 30 seconds.  Pt instructed to leave bandage intact for a minimum of one hour, and to call with questions or concerns. Patient states understanding, discharged.

## 2022-12-29 NOTE — PATIENT INSTRUCTIONS

## 2022-12-30 ENCOUNTER — APPOINTMENT (OUTPATIENT)
Dept: RADIATION ONCOLOGY | Facility: HOSPITAL | Age: 62
End: 2022-12-30
Payer: COMMERCIAL

## 2022-12-30 ENCOUNTER — RESULTS ONLY (OUTPATIENT)
Dept: RADIATION ONCOLOGY | Facility: HOSPITAL | Age: 62
End: 2022-12-30

## 2022-12-30 LAB
RAD ONC ARIA COURSE ID: NORMAL
RAD ONC ARIA COURSE LAST TREATMENT DATE: NORMAL
RAD ONC ARIA COURSE START DATE: NORMAL
RAD ONC ARIA COURSE TREATMENT ELAPSED DAYS: 31
RAD ONC ARIA FIRST TREATMENT DATE: NORMAL
RAD ONC ARIA PLAN FRACTIONS TREATED TO DATE: 20
RAD ONC ARIA PLAN ID: NORMAL
RAD ONC ARIA PLAN PRESCRIBED DOSE PER FRACTION: 2 GY
RAD ONC ARIA PLAN TOTAL FRACTIONS PRESCRIBED: 35
RAD ONC ARIA PLAN TOTAL PRESCRIBED DOSE: 7000 CGY
RAD ONC ARIA REFERENCE POINT DOSAGE GIVEN TO DATE: NORMAL GY
RAD ONC ARIA REFERENCE POINT DOSAGE GIVEN TO DATE: NORMAL GY
RAD ONC ARIA REFERENCE POINT ID: NORMAL
RAD ONC ARIA REFERENCE POINT ID: NORMAL

## 2022-12-30 PROCEDURE — 77386 HC IMRT TREATMENT DELIVERY, COMPLEX: CPT | Performed by: RADIOLOGY

## 2022-12-30 PROCEDURE — 77014 HC CT GUIDE FOR PLACEMENT RADIATION THERAPY FIELDS: CPT | Performed by: RADIOLOGY

## 2022-12-30 PROCEDURE — 77336 RADIATION PHYSICS CONSULT: CPT | Performed by: RADIOLOGY

## 2022-12-30 PROCEDURE — 77014 PR CT GUIDE FOR PLACEMENT RADIATION THERAPY FIELDS: CPT | Mod: 26 | Performed by: RADIOLOGY

## 2022-12-30 PROCEDURE — 77427 RADIATION TX MANAGEMENT X5: CPT | Performed by: RADIOLOGY

## 2023-01-03 ENCOUNTER — OFFICE VISIT (OUTPATIENT)
Dept: RADIATION ONCOLOGY | Facility: HOSPITAL | Age: 63
End: 2023-01-03
Attending: RADIOLOGY
Payer: COMMERCIAL

## 2023-01-03 ENCOUNTER — INFUSION THERAPY VISIT (OUTPATIENT)
Dept: INFUSION THERAPY | Facility: OTHER | Age: 63
End: 2023-01-03
Attending: NURSE PRACTITIONER
Payer: COMMERCIAL

## 2023-01-03 ENCOUNTER — RESULTS ONLY (OUTPATIENT)
Dept: RADIATION ONCOLOGY | Facility: HOSPITAL | Age: 63
End: 2023-01-03

## 2023-01-03 ENCOUNTER — ONCOLOGY VISIT (OUTPATIENT)
Dept: ONCOLOGY | Facility: OTHER | Age: 63
End: 2023-01-03
Attending: INTERNAL MEDICINE
Payer: COMMERCIAL

## 2023-01-03 ENCOUNTER — APPOINTMENT (OUTPATIENT)
Dept: INFUSION THERAPY | Facility: OTHER | Age: 63
End: 2023-01-03
Attending: INTERNAL MEDICINE
Payer: COMMERCIAL

## 2023-01-03 ENCOUNTER — APPOINTMENT (OUTPATIENT)
Dept: RADIATION ONCOLOGY | Facility: HOSPITAL | Age: 63
End: 2023-01-03
Payer: COMMERCIAL

## 2023-01-03 VITALS
WEIGHT: 224.21 LBS | RESPIRATION RATE: 18 BRPM | HEART RATE: 77 BPM | DIASTOLIC BLOOD PRESSURE: 78 MMHG | HEIGHT: 69 IN | OXYGEN SATURATION: 95 % | TEMPERATURE: 98.6 F | SYSTOLIC BLOOD PRESSURE: 134 MMHG | BODY MASS INDEX: 33.21 KG/M2

## 2023-01-03 VITALS
BODY MASS INDEX: 32.98 KG/M2 | HEART RATE: 78 BPM | RESPIRATION RATE: 19 BRPM | TEMPERATURE: 98.3 F | WEIGHT: 223.3 LBS | OXYGEN SATURATION: 93 % | DIASTOLIC BLOOD PRESSURE: 78 MMHG | SYSTOLIC BLOOD PRESSURE: 138 MMHG

## 2023-01-03 VITALS — DIASTOLIC BLOOD PRESSURE: 65 MMHG | SYSTOLIC BLOOD PRESSURE: 118 MMHG | HEART RATE: 70 BPM

## 2023-01-03 DIAGNOSIS — Z51.11 ENCOUNTER FOR ANTINEOPLASTIC CHEMOTHERAPY: ICD-10-CM

## 2023-01-03 DIAGNOSIS — Z51.11 ENCOUNTER FOR ANTINEOPLASTIC CHEMOTHERAPY: Primary | ICD-10-CM

## 2023-01-03 DIAGNOSIS — C09.9 MALIGNANT NEOPLASM OF TONSIL (H): Primary | ICD-10-CM

## 2023-01-03 DIAGNOSIS — C09.9 MALIGNANT NEOPLASM OF TONSIL (H): ICD-10-CM

## 2023-01-03 LAB
ALBUMIN SERPL BCG-MCNC: 3.9 G/DL (ref 3.5–5.2)
ALP SERPL-CCNC: 73 U/L (ref 40–129)
ALT SERPL W P-5'-P-CCNC: 29 U/L (ref 10–50)
ANION GAP SERPL CALCULATED.3IONS-SCNC: 11 MMOL/L (ref 7–15)
AST SERPL W P-5'-P-CCNC: 29 U/L (ref 10–50)
BASOPHILS # BLD AUTO: 0 10E3/UL (ref 0–0.2)
BASOPHILS NFR BLD AUTO: 0 %
BILIRUB SERPL-MCNC: 0.4 MG/DL
BUN SERPL-MCNC: 17.5 MG/DL (ref 8–23)
CALCIUM SERPL-MCNC: 9.2 MG/DL (ref 8.8–10.2)
CHLORIDE SERPL-SCNC: 101 MMOL/L (ref 98–107)
CREAT SERPL-MCNC: 0.75 MG/DL (ref 0.67–1.17)
DEPRECATED HCO3 PLAS-SCNC: 26 MMOL/L (ref 22–29)
EOSINOPHIL # BLD AUTO: 0.1 10E3/UL (ref 0–0.7)
EOSINOPHIL NFR BLD AUTO: 1 %
ERYTHROCYTE [DISTWIDTH] IN BLOOD BY AUTOMATED COUNT: 13.1 % (ref 10–15)
GFR SERPL CREATININE-BSD FRML MDRD: >90 ML/MIN/1.73M2
GLUCOSE SERPL-MCNC: 99 MG/DL (ref 70–99)
HCT VFR BLD AUTO: 37.6 % (ref 40–53)
HGB BLD-MCNC: 13.1 G/DL (ref 13.3–17.7)
IMM GRANULOCYTES # BLD: 0 10E3/UL
IMM GRANULOCYTES NFR BLD: 0 %
LYMPHOCYTES # BLD AUTO: 0.8 10E3/UL (ref 0.8–5.3)
LYMPHOCYTES NFR BLD AUTO: 15 %
MAGNESIUM SERPL-MCNC: 1.9 MG/DL (ref 1.7–2.3)
MCH RBC QN AUTO: 31.3 PG (ref 26.5–33)
MCHC RBC AUTO-ENTMCNC: 34.8 G/DL (ref 31.5–36.5)
MCV RBC AUTO: 90 FL (ref 78–100)
MONOCYTES # BLD AUTO: 0.6 10E3/UL (ref 0–1.3)
MONOCYTES NFR BLD AUTO: 11 %
NEUTROPHILS # BLD AUTO: 3.8 10E3/UL (ref 1.6–8.3)
NEUTROPHILS NFR BLD AUTO: 73 %
NRBC # BLD AUTO: 0 10E3/UL
NRBC BLD AUTO-RTO: 0 /100
PLATELET # BLD AUTO: 148 10E3/UL (ref 150–450)
POTASSIUM SERPL-SCNC: 4 MMOL/L (ref 3.4–5.3)
PROT SERPL-MCNC: 6.3 G/DL (ref 6.4–8.3)
RAD ONC ARIA COURSE ID: NORMAL
RAD ONC ARIA COURSE LAST TREATMENT DATE: NORMAL
RAD ONC ARIA COURSE START DATE: NORMAL
RAD ONC ARIA COURSE TREATMENT ELAPSED DAYS: 35
RAD ONC ARIA FIRST TREATMENT DATE: NORMAL
RAD ONC ARIA PLAN FRACTIONS TREATED TO DATE: 21
RAD ONC ARIA PLAN ID: NORMAL
RAD ONC ARIA PLAN PRESCRIBED DOSE PER FRACTION: 2 GY
RAD ONC ARIA PLAN TOTAL FRACTIONS PRESCRIBED: 35
RAD ONC ARIA PLAN TOTAL PRESCRIBED DOSE: 7000 CGY
RAD ONC ARIA REFERENCE POINT DOSAGE GIVEN TO DATE: NORMAL GY
RAD ONC ARIA REFERENCE POINT DOSAGE GIVEN TO DATE: NORMAL GY
RAD ONC ARIA REFERENCE POINT ID: NORMAL
RAD ONC ARIA REFERENCE POINT ID: NORMAL
RBC # BLD AUTO: 4.19 10E6/UL (ref 4.4–5.9)
SODIUM SERPL-SCNC: 138 MMOL/L (ref 136–145)
WBC # BLD AUTO: 5.3 10E3/UL (ref 4–11)

## 2023-01-03 PROCEDURE — 96367 TX/PROPH/DG ADDL SEQ IV INF: CPT | Performed by: INTERNAL MEDICINE

## 2023-01-03 PROCEDURE — 85025 COMPLETE CBC W/AUTO DIFF WBC: CPT | Performed by: INTERNAL MEDICINE

## 2023-01-03 PROCEDURE — 96413 CHEMO IV INFUSION 1 HR: CPT | Performed by: INTERNAL MEDICINE

## 2023-01-03 PROCEDURE — 36591 DRAW BLOOD OFF VENOUS DEVICE: CPT | Performed by: INTERNAL MEDICINE

## 2023-01-03 PROCEDURE — 83735 ASSAY OF MAGNESIUM: CPT | Performed by: INTERNAL MEDICINE

## 2023-01-03 PROCEDURE — 99214 OFFICE O/P EST MOD 30 MIN: CPT | Mod: 25 | Performed by: INTERNAL MEDICINE

## 2023-01-03 PROCEDURE — 80053 COMPREHEN METABOLIC PANEL: CPT | Performed by: INTERNAL MEDICINE

## 2023-01-03 PROCEDURE — 96375 TX/PRO/DX INJ NEW DRUG ADDON: CPT | Performed by: INTERNAL MEDICINE

## 2023-01-03 RX ORDER — EPINEPHRINE 1 MG/ML
0.3 INJECTION, SOLUTION, CONCENTRATE INTRAVENOUS EVERY 5 MIN PRN
Status: CANCELLED | OUTPATIENT
Start: 2023-01-03

## 2023-01-03 RX ORDER — MEPERIDINE HYDROCHLORIDE 25 MG/ML
25 INJECTION INTRAMUSCULAR; INTRAVENOUS; SUBCUTANEOUS EVERY 30 MIN PRN
Status: CANCELLED | OUTPATIENT
Start: 2023-01-03

## 2023-01-03 RX ORDER — DIPHENHYDRAMINE HYDROCHLORIDE 50 MG/ML
50 INJECTION INTRAMUSCULAR; INTRAVENOUS
Status: CANCELLED
Start: 2023-01-03

## 2023-01-03 RX ORDER — HEPARIN SODIUM (PORCINE) LOCK FLUSH IV SOLN 100 UNIT/ML 100 UNIT/ML
5 SOLUTION INTRAVENOUS
Status: CANCELLED | OUTPATIENT
Start: 2023-01-03

## 2023-01-03 RX ORDER — HEPARIN SODIUM,PORCINE 10 UNIT/ML
5 VIAL (ML) INTRAVENOUS
Status: CANCELLED | OUTPATIENT
Start: 2023-01-03

## 2023-01-03 RX ORDER — ALBUTEROL SULFATE 0.83 MG/ML
2.5 SOLUTION RESPIRATORY (INHALATION)
Status: CANCELLED | OUTPATIENT
Start: 2023-01-03

## 2023-01-03 RX ORDER — ALBUTEROL SULFATE 90 UG/1
1-2 AEROSOL, METERED RESPIRATORY (INHALATION)
Status: CANCELLED
Start: 2023-01-03

## 2023-01-03 RX ORDER — METHYLPREDNISOLONE SODIUM SUCCINATE 125 MG/2ML
125 INJECTION, POWDER, LYOPHILIZED, FOR SOLUTION INTRAMUSCULAR; INTRAVENOUS
Status: CANCELLED
Start: 2023-01-03

## 2023-01-03 RX ORDER — PALONOSETRON 0.05 MG/ML
0.25 INJECTION, SOLUTION INTRAVENOUS ONCE
Status: COMPLETED | OUTPATIENT
Start: 2023-01-03 | End: 2023-01-03

## 2023-01-03 RX ORDER — HEPARIN SODIUM (PORCINE) LOCK FLUSH IV SOLN 100 UNIT/ML 100 UNIT/ML
5 SOLUTION INTRAVENOUS
Status: DISCONTINUED | OUTPATIENT
Start: 2023-01-03 | End: 2023-01-03 | Stop reason: HOSPADM

## 2023-01-03 RX ORDER — LORAZEPAM 2 MG/ML
0.5 INJECTION INTRAMUSCULAR EVERY 4 HOURS PRN
Status: CANCELLED | OUTPATIENT
Start: 2023-01-03

## 2023-01-03 RX ADMIN — HEPARIN SODIUM (PORCINE) LOCK FLUSH IV SOLN 100 UNIT/ML 5 ML: 100 SOLUTION at 14:29

## 2023-01-03 RX ADMIN — Medication 1000 ML: at 12:12

## 2023-01-03 RX ADMIN — PALONOSETRON 0.25 MG: 0.05 INJECTION, SOLUTION INTRAVENOUS at 12:17

## 2023-01-03 ASSESSMENT — PAIN SCALES - GENERAL
PAINLEVEL: MILD PAIN (3)
PAINLEVEL: NO PAIN (0)
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
## 2023-01-03 NOTE — NURSING NOTE
"Oncology Rooming Note    January 3, 2023 9:17 AM   Melchor Sadler is a 62 year old male who presents for:    Chief Complaint   Patient presents with     Oncology Clinic Visit     Follow up. Malignant neoplasm of tonsil      Initial Vitals: /78   Pulse 77   Temp 98.6  F (37  C) (Tympanic)   Resp 18   Ht 1.753 m (5' 9\")   Wt 101.7 kg (224 lb 3.3 oz)   SpO2 95%   BMI 33.11 kg/m   Estimated body mass index is 33.11 kg/m  as calculated from the following:    Height as of this encounter: 1.753 m (5' 9\").    Weight as of this encounter: 101.7 kg (224 lb 3.3 oz). Body surface area is 2.23 meters squared.  No Pain (0) Comment: Data Unavailable   No LMP for male patient.  Allergies reviewed: Yes  Medications reviewed: Yes    Medications: Medication refills not needed today.  Pharmacy name entered into [employer]: [facility] - [address]    Clinical concerns: none       Ángela Clark LPN              "

## 2023-01-03 NOTE — PROGRESS NOTES
"Progress Notes  Encounter Date: Delta 3, 2023  Jeramie Figueroa MD     RADIATION ONCOLOGY WEEKLY MANAGEMENT PROGRESS NOTE     Patient Care Team       Relationship Specialty Notifications Start End    No Ref-Primary, Physician PCP - General   11/14/22     Fax: [phone]         Maryam Cannon MD Assigned PCP   9/24/22     Phone: [phone] Fax: [phone]         [address] [place] [address]    Vicki Alaniz MD Assigned Cancer Care Provider   11/26/22     Phone: [phone] Fax: [phone]         [address] [place] [address]    Isabelle Alamo, RN Specialty Care Coordinator Hematology & Oncology Admissions 12/13/22     Ángela Contreras RN Specialty Care Coordinator Hematology & Oncology All results, Admissions 12/13/22     Phone: [phone] Fax: [phone]         [facility] [address] [place] [address]            DIAGNOSIS:  Cancer Staging   Malignant neoplasm of tonsil (H)  Staging form: Pharynx - Oropharynx, AJCC 8th Edition  - Clinical stage from 11/16/2022: Stage I (cT1, cN1, cM0, p16+) - Signed by Vicki Alaniz MD on 11/16/2022    RADIATION THERAPY:    Melchor Sadler has received 4200 c Gy to date.   Treatment 21 of 35 fractions  Total planned dose 7000 cGy      SUBJECTIVE:    Using baking soda/salt rinse, magic mouth wash, nystatin, and after sun Aloe Vera.  Wife notes that his cough is worse this week. Reports his mouth was previously dry but now has thick mucous and he believes this is what is causing him to cough.  Has not been using oxygen over the \"last few weeks\". Reports he is still not sleeping well and is waiting to hear from sleep lab. Stable fatigue. Has been eating more soft foods such as eggs, mashed potatoes, ensure shakes, and soups due to his mouth sores.      OBJECTIVE:    WEIGHT: 223 lbs 4.8 oz.  Examination reveals moderate erythema of neck, dry desquamation present to left side of neck, no open areas.  Tonsil flat.  Small oral " lesions/ sores present. Lymph node about 1-1.5 cm and soft.      IMPRESSION:  Routine tolerance to radiation therapy.       PLAN:  Continue treatment as planned. Can try mucinex to help thin secretions/ help with cough caused by thick secretions. Push fluids. Increase skin care, apply skin cream to QID, start Aquaphor.        Medical record and imaging reviewed.    Bety Figueroa MD

## 2023-01-03 NOTE — PROGRESS NOTES
Patient is a 62 year old here accompanied by s/o today for infusion of Cisplatin under the orders of Dr. Pruitt. Right sided power port accessed with 19 gauge 3/4 inch 90 degree bent non coring needle.  Line flushed with 10 cc's normal saline.  Needle secured with sterile transparent dressing.  10 cc's blood discarded, and blood taken for 2 tubes of ordered labs.  Patient tolerated well. Denies pain and discomfort at this time. Port flushes easily without resistance.    Hand hygiene performed: yes   Mask donned by caregiver: yes Site prepped with CHG: yes Labs drawn: yes Dressing applied using aseptic technique: yes   Component      Latest Ref Rng & Units 1/3/2023   WBC      4.0 - 11.0 10e3/uL 5.3   RBC Count      4.40 - 5.90 10e6/uL 4.19 (L)   Hemoglobin      13.3 - 17.7 g/dL 13.1 (L)   Hematocrit      40.0 - 53.0 % 37.6 (L)   MCV      78 - 100 fL 90   MCH      26.5 - 33.0 pg 31.3   MCHC      31.5 - 36.5 g/dL 34.8   RDW      10.0 - 15.0 % 13.1   Platelet Count      150 - 450 10e3/uL 148 (L)   % Neutrophils      % 73   % Lymphocytes      % 15   % Monocytes      % 11   % Eosinophils      % 1   % Basophils      % 0   % Immature Granulocytes      % 0   NRBCs per 100 WBC      <1 /100 0   Absolute Neutrophils      1.6 - 8.3 10e3/uL 3.8   Absolute Lymphocytes      0.8 - 5.3 10e3/uL 0.8   Absolute Monocytes      0.0 - 1.3 10e3/uL 0.6   Absolute Eosinophils      0.0 - 0.7 10e3/uL 0.1   Absolute Basophils      0.0 - 0.2 10e3/uL 0.0   Absolute Immature Granulocytes      <=0.4 10e3/uL 0.0   Absolute NRBCs      10e3/uL 0.0   Sodium      136 - 145 mmol/L 138   Potassium      3.4 - 5.3 mmol/L 4.0   Chloride      98 - 107 mmol/L 101   Carbon Dioxide (CO2)      22 - 29 mmol/L 26   Anion Gap      7 - 15 mmol/L 11   Urea Nitrogen      8.0 - 23.0 mg/dL 17.5   Creatinine      0.67 - 1.17 mg/dL 0.75   Calcium      8.8 - 10.2 mg/dL 9.2   Glucose      70 - 99 mg/dL 99   Alkaline Phosphatase      40 - 129 U/L 73   AST      10 - 50 U/L 29    ALT      10 - 50 U/L 29   Protein Total      6.4 - 8.3 g/dL 6.3 (L)   Albumin      3.5 - 5.2 g/dL 3.9   Bilirubin Total      <=1.2 mg/dL 0.4   GFR Estimate      >60 mL/min/1.73m2 >90   Magnesium      1.7 - 2.3 mg/dL 1.9       Cisplatin dose(s) verified with Deb AYALA RN prior to release of drug.    Patient meets parameters for today's infusion.  Denies questions or concerns regarding today's infusion and/or medications being administered.      Patient identified with two identifiers, order verified, and verbal consent for today's infusion obtained from patient.    1322 IV pump verified with Cisplatin dose, drug, and rate of administration. Infusion administered per protocol. Patient tolerated infusion well, no signs or symptoms of adverse reaction noted. Patient denies pain nor discomfort.     Needle removed, tip intact. Site clean, dry and intact. Covered with a sterile bandage, slight pressure applied for 30 seconds. Pt instructed to leave bandage intact for a minimum of one hour, and to call with questions or concerns. Copy of appointments, discharge instructions, and after visit summary (AVS) provided to patient. Patient states understanding, discharged ambulatory.

## 2023-01-03 NOTE — PATIENT INSTRUCTIONS

## 2023-01-04 ENCOUNTER — RESULTS ONLY (OUTPATIENT)
Dept: RADIATION ONCOLOGY | Facility: HOSPITAL | Age: 63
End: 2023-01-04

## 2023-01-04 LAB
RAD ONC ARIA COURSE ID: NORMAL
RAD ONC ARIA COURSE LAST TREATMENT DATE: NORMAL
RAD ONC ARIA COURSE START DATE: NORMAL
RAD ONC ARIA COURSE TREATMENT ELAPSED DAYS: 36
RAD ONC ARIA FIRST TREATMENT DATE: NORMAL
RAD ONC ARIA PLAN FRACTIONS TREATED TO DATE: 22
RAD ONC ARIA PLAN ID: NORMAL
RAD ONC ARIA PLAN PRESCRIBED DOSE PER FRACTION: 2 GY
RAD ONC ARIA PLAN TOTAL FRACTIONS PRESCRIBED: 35
RAD ONC ARIA PLAN TOTAL PRESCRIBED DOSE: 7000 CGY
RAD ONC ARIA REFERENCE POINT DOSAGE GIVEN TO DATE: NORMAL GY
RAD ONC ARIA REFERENCE POINT DOSAGE GIVEN TO DATE: NORMAL GY
RAD ONC ARIA REFERENCE POINT ID: NORMAL
RAD ONC ARIA REFERENCE POINT ID: NORMAL

## 2023-01-04 NOTE — PROGRESS NOTES
"Oncology Treatment Followup    Reason for Visit:  Melchor is a 62 year old gentleman with a diagnosis of tonsillar cancer who presents today for consideration of ongoing chemotherapy. He is due for D43 Cisplatin, given concurrently with XRT.     Nursing Note and documentation reviewed: Yes    HPI:    Patient is doing alright. Nearing the end of his concurrent chemorads. Today, he denies any signs of infection. No fever, chills, chest pain, or SOB. Intermittent cough. Does have some skin burns to the left side of his neck, but no sloughing. Using ointment as advised by radiation staff liberally. Does have some oral mouth sores. Using salt/soda and well as magic mouthwash. Notes that he drinks so much that MM does do much as he feels he washes it all away. No current evidence of thrush. No bleeding concerns. Some intermittent nausea. Compazine has helped. Appetite varies, some good days, others not so good. Has lost weight over course of treatment (about 22#) but feels he is back to more his pre-treatment baseline. Swallowing is difficult at times, especially with acidic foods/drinks. Prune juice has been difficult but that has helped some with constipation. Continues have BM daily but notes his pre-treatment \"normal\" was 2-3 x daily. Hiccups have improved. Mylanta PRN for indigestion. Energy is reasonable. He is totally worn by the end of the day but doesn't really nap much during day. Overall, doing well for this point of therapy.     Oncologic History:   05/2021 First noticed left neck mass  08/16/2022 Seen by PCP, with complaints of left growing neck mass. CT ordered.   08/23/2022 CT soft tissue neck mass: 3.8 cm probable everardo mass in the left jugulodigastric chain. Adjacent also enlarged 2.3 cm long axis node. Differential considerations favor  metastatic disease. A mucosal mass is questioned at the roof of the left tonsillar pillar.  09/13/2022 Evaluated by Dr. Houston at [facility] who recommended pan endoscopy " with biopsy of the left tonsil  09/22/2022 Underwent Panendoscopy with path showing poorly differentiated squamous carcinoma, p16 positive.   10/04/2022 Seen again by Dr. Houston who recommended PET scan and referral to radiation oncology and medical oncology for discussion regarding concurrent chemoRT.   10/26/2022 Met in consultation with Dr. Restrepo of [facility], who at the time, states his radiotherapy plan would be 70 Gy delivered in 35 fractions of 2 Gy each. Ipsilateral vs bilateral neck coverage is TBD by results of PET CT.   11/2/2022 PET scan IMPRESSION: Large left anterior cervical chain lymph node metastasis measuring 4.0 x 3.5 cm in dimension. There are a few mildly enlarged adjacent left anterior cervical chain lymph nodes with uptake not significantly greater than that of background uptake. No evidence of distant metastatic disease otherwise.  11/03/2022 Met in consultation with Dr. Pruitt of [facility] who agreed with the the plan of concurrent chemorads, utilizing Cisplatin 40 mg weekly.   11/11/2022 Planned port placement  11/16/2022 Planned Rad Onc simulation  11/28/2022 Commenced XRT treatments  11/29/2022 C1 Cisplatin    Treatment Goal: Curative    Planned TX: Weekly Cisplatin 40 mg/m2 given concurrently with radiation therapy  Current Cycle: C1D43      Previous treatment:  N/A    Past Medical History:   Diagnosis Date     Malignant neoplasm of tonsil (H) 10/04/2022    Per [facility] Chart     Oropharyngeal cancer (H) 09/13/2022    Per [facility] Chart     GLENDA (obstructive sleep apnea) 09/22/2022    Per [facility] chart       Past Surgical History:   Procedure Laterality Date     INSERT PORT VASCULAR ACCESS N/A 11/11/2022    Procedure: Port-a-cath placement;  Surgeon: Teodoro Avila MD;  Location: [facility] OR     Panendoscopy, biopsy left palate  09/22/2022    [facility]-Dr. Jamey Houston       Family History   Problem Relation Age of Onset     Lung Cancer Mother      Cancer Mother      Diabetes Father       Colon Cancer Maternal Grandmother      Hyperlipidemia No family hx of      Coronary Artery Disease No family hx of      Hypertension No family hx of      Breast Cancer No family hx of      Prostate Cancer No family hx of      Depression No family hx of      Anxiety Disorder No family hx of      Mental Illness No family hx of      Anesthesia Reaction No family hx of      Asthma No family hx of        Social History     Socioeconomic History     Marital status:      Spouse name: Sandra     Number of children: 2     Years of education: 16     Highest education level: Not on file   Occupational History     Employer: [employer]     Occupation: ASV   Tobacco Use     Smoking status: Never     Smokeless tobacco: Former     Types: Chew     Quit date: 1995   Vaping Use     Vaping Use: Never used   Substance and Sexual Activity     Alcohol use: Not Currently     Drug use: No     Comment: Drug use: No     Sexual activity: Yes     Partners: Female   Other Topics Concern     Parent/sibling w/ CABG, MI or angioplasty before 65F 55M? Not Asked   Social History Narrative     Not on file     Social Determinants of Health     Financial Resource Strain: Not on file   Food Insecurity: Not on file   Transportation Needs: Not on file   Physical Activity: Not on file   Stress: Not on file   Social Connections: Not on file   Intimate Partner Violence: Not on file   Housing Stability: Not on file       Current Outpatient Medications   Medication     magic mouthwash (ENTER INGREDIENTS IN COMMENTS) suspension     magnesium oxide (MAG-OX) 400 MG tablet     NONFORMULARY     NONFORMULARY     nystatin (MYCOSTATIN) 974554 UNIT/ML suspension     prochlorperazine (COMPAZINE) 10 MG tablet     UNKNOWN TO PATIENT     zinc gluconate 50 MG tablet     ondansetron (ZOFRAN) 8 MG tablet     No current facility-administered medications for this visit.        Allergies   Allergen Reactions     Azithromycin Other (See Comments)     Panic attacks      "Codeine Other (See Comments)     Anxiety     Penicillins Anxiety     Pt states his sister and father are allergic, but he doesn't believe he is. 11/11/2022       Review Of Systems:  A complete review of systems is negative except for the above mentioned items in the interval history.     Physical Exam:  /72   Pulse 71   Temp 98.1  F (36.7  C) (Tympanic)   Resp 17   Ht 1.753 m (5' 9\")   Wt 100.2 kg (220 lb 14.4 oz)   SpO2 96%   BMI 32.62 kg/m    GENERAL APPEARANCE: Healthy, alert and in no acute distress.  HEENT: Eyes appear normal without scleral icterus. Extraocular movements intact. Numerous mouth sore notes, in particular along outer tongue of the upper aspect of posterior mouth.   NECK:   Supple with normal range of motion. Errythema or radiation field. Left neck mass present.    RESP: Lungs clear to auscultation bilaterally, respirations regular and easy.  CARDIOVASCULAR: Regular rate and rhythm. Normal S1, S2; no murmur, gallop, or rub.  ABDOMEN: Soft, non-tender, non-distended. Bowel sounds auscultated all 4 quadrants. No palpable organomegaly or masses.  MUSCULOSKELETAL: Extremities without gross deformities noted. No edema of bilateral lower extremities.  NEURO: Alert and oriented x 3.  Gait steady.  PSYCHIATRIC: Mentation and affect appear normal.  Mood appropriate.    Laboratory:  Results for orders placed or performed in visit on 01/09/23   Rad Onc Aria Session Summary     Status: None   Result Value Ref Range    Course ID C1     Course Start Date 11/7/2022     Course First Treatment Date 11/29/2022     Course Last Treatment Date 1/9/2023     Course Elapsed Days 41     Reference Point ID Iso Tonsil     Reference Point Dosage Given to Date (Gy) [id number]  [id number]   Gy    Reference Point ID RX PTV Tonsil lt     Reference Point Dosage Given to Date (Gy) 50  2   Gy    Plan ID Lt Tonsil     Plan Fractions Treated to Date 25     Plan Total Fractions Prescribed 35     Plan Prescribed Dose Per " Fraction (Gy) 2 Gy    Plan Total Prescribed Dose (cGy) 7,000 cGy   Results for orders placed or performed in visit on 01/09/23   Comprehensive metabolic panel     Status: Normal   Result Value Ref Range    Sodium 138 136 - 145 mmol/L    Potassium 4.4 3.4 - 5.3 mmol/L    Chloride 102 98 - 107 mmol/L    Carbon Dioxide (CO2) 28 22 - 29 mmol/L    Anion Gap 8 7 - 15 mmol/L    Urea Nitrogen 21.8 8.0 - 23.0 mg/dL    Creatinine 0.74 0.67 - 1.17 mg/dL    Calcium 9.2 8.8 - 10.2 mg/dL    Glucose 99 70 - 99 mg/dL    Alkaline Phosphatase 78 40 - 129 U/L    AST 28 10 - 50 U/L    ALT 29 10 - 50 U/L    Protein Total 6.5 6.4 - 8.3 g/dL    Albumin 4.0 3.5 - 5.2 g/dL    Bilirubin Total 0.4 <=1.2 mg/dL    GFR Estimate >90 >60 mL/min/1.73m2   Magnesium     Status: Normal   Result Value Ref Range    Magnesium 2.0 1.7 - 2.3 mg/dL   CBC with platelets and differential     Status: Abnormal   Result Value Ref Range    WBC Count 4.8 4.0 - 11.0 10e3/uL    RBC Count 4.09 (L) 4.40 - 5.90 10e6/uL    Hemoglobin 13.1 (L) 13.3 - 17.7 g/dL    Hematocrit 36.8 (L) 40.0 - 53.0 %    MCV 90 78 - 100 fL    MCH 32.0 26.5 - 33.0 pg    MCHC 35.6 31.5 - 36.5 g/dL    RDW 13.7 10.0 - 15.0 %    Platelet Count 141 (L) 150 - 450 10e3/uL    % Neutrophils 76 %    % Lymphocytes 14 %    % Monocytes 10 %    % Eosinophils 0 %    % Basophils 0 %    % Immature Granulocytes 0 %    NRBCs per 100 WBC 0 <1 /100    Absolute Neutrophils 3.6 1.6 - 8.3 10e3/uL    Absolute Lymphocytes 0.7 (L) 0.8 - 5.3 10e3/uL    Absolute Monocytes 0.5 0.0 - 1.3 10e3/uL    Absolute Eosinophils 0.0 0.0 - 0.7 10e3/uL    Absolute Basophils 0.0 0.0 - 0.2 10e3/uL    Absolute Immature Granulocytes 0.0 <=0.4 10e3/uL    Absolute NRBCs 0.0 10e3/uL   CBC with platelets differential     Status: Abnormal    Narrative    The following orders were created for panel order CBC with platelets differential.  Procedure                               Abnormality         Status                     ---------                                -----------         ------                     CBC with platelets and d...[[id number]]  Abnormal            Final result                 Please view results for these tests on the individual orders.       Imaging Studies:    None this visit    ASSESSMENT/PLAN:    1. 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 Undergoing concurrent chemorads with utilization of Cisplatin. Due for Day 43 treatment tomorrow. I see no contraindication to proceeding with therapy this week. IVF scheduled this Thursday. I will see him back in one week for his final infusion. At that time, we will refer back to ENT and get his PET scheduled. He will follow-up sooner with concerns.     2. Stomatitis Continue salt/soda rinses and well as PRN magic mouthwash.     3. Chemo induced constipation Prune juice working for now. Having BM daily. Educated that I don't want him going more than 2-3 days without BM and to call for further instructions if that happens.     4. Chemo induced nausea Compazine has been helpful. Denies needing refills. Continue using PRN.     I encouraged patient to call with any questions or concerns.    40 minutes spent in the patient's encounter today with time spent in review of the chart along with in chart preparation.  Time was spent in review of the treamtent plan.  Time was also spent obtaining a review of systems and performing a physical exam.  Time was spent in review of all planned medications for treatment with the patient and questions answered.     [facility]  Medical Oncology

## 2023-01-05 ENCOUNTER — RESULTS ONLY (OUTPATIENT)
Dept: RADIATION ONCOLOGY | Facility: HOSPITAL | Age: 63
End: 2023-01-05

## 2023-01-05 ENCOUNTER — APPOINTMENT (OUTPATIENT)
Dept: RADIATION ONCOLOGY | Facility: HOSPITAL | Age: 63
End: 2023-01-05
Payer: COMMERCIAL

## 2023-01-05 ENCOUNTER — INFUSION THERAPY VISIT (OUTPATIENT)
Dept: INFUSION THERAPY | Facility: OTHER | Age: 63
End: 2023-01-05
Attending: INTERNAL MEDICINE
Payer: COMMERCIAL

## 2023-01-05 VITALS
OXYGEN SATURATION: 94 % | RESPIRATION RATE: 18 BRPM | TEMPERATURE: 98.1 F | SYSTOLIC BLOOD PRESSURE: 107 MMHG | DIASTOLIC BLOOD PRESSURE: 65 MMHG | HEART RATE: 68 BPM

## 2023-01-05 DIAGNOSIS — C09.9 MALIGNANT NEOPLASM OF TONSIL (H): Primary | ICD-10-CM

## 2023-01-05 DIAGNOSIS — Z51.11 ENCOUNTER FOR ANTINEOPLASTIC CHEMOTHERAPY: ICD-10-CM

## 2023-01-05 LAB
RAD ONC ARIA COURSE ID: NORMAL
RAD ONC ARIA COURSE LAST TREATMENT DATE: NORMAL
RAD ONC ARIA COURSE START DATE: NORMAL
RAD ONC ARIA COURSE TREATMENT ELAPSED DAYS: 37
RAD ONC ARIA FIRST TREATMENT DATE: NORMAL
RAD ONC ARIA PLAN FRACTIONS TREATED TO DATE: 23
RAD ONC ARIA PLAN ID: NORMAL
RAD ONC ARIA PLAN PRESCRIBED DOSE PER FRACTION: 2 GY
RAD ONC ARIA PLAN TOTAL FRACTIONS PRESCRIBED: 35
RAD ONC ARIA PLAN TOTAL PRESCRIBED DOSE: 7000 CGY
RAD ONC ARIA REFERENCE POINT DOSAGE GIVEN TO DATE: NORMAL GY
RAD ONC ARIA REFERENCE POINT DOSAGE GIVEN TO DATE: NORMAL GY
RAD ONC ARIA REFERENCE POINT ID: NORMAL
RAD ONC ARIA REFERENCE POINT ID: NORMAL

## 2023-01-05 PROCEDURE — 77387 GUIDANCE FOR RADJ TX DLVR: CPT | Performed by: RADIOLOGY

## 2023-01-05 PROCEDURE — 77386 HC IMRT TREATMENT DELIVERY, COMPLEX: CPT | Performed by: RADIOLOGY

## 2023-01-05 PROCEDURE — 96360 HYDRATION IV INFUSION INIT: CPT | Performed by: INTERNAL MEDICINE

## 2023-01-05 RX ORDER — HEPARIN SODIUM (PORCINE) LOCK FLUSH IV SOLN 100 UNIT/ML 100 UNIT/ML
5 SOLUTION INTRAVENOUS
Status: DISCONTINUED | OUTPATIENT
Start: 2023-01-05 | End: 2023-01-05 | Stop reason: HOSPADM

## 2023-01-05 RX ORDER — HEPARIN SODIUM (PORCINE) LOCK FLUSH IV SOLN 100 UNIT/ML 100 UNIT/ML
5 SOLUTION INTRAVENOUS
Status: CANCELLED | OUTPATIENT
Start: 2023-01-05

## 2023-01-05 RX ADMIN — HEPARIN SODIUM (PORCINE) LOCK FLUSH IV SOLN 100 UNIT/ML 5 ML: 100 SOLUTION at 15:10

## 2023-01-05 RX ADMIN — Medication 1000 ML: at 14:05

## 2023-01-05 NOTE — PROGRESS NOTES
Patient is a 62 year old here accompanied by family today for infusion of IVF per order of Dr Pruitt.  Patient identified with two identifiers, order verified, and verbal consent for today's infusion obtained from patient.      Patient meets order parameters for today's treatment.     Patient denies questions or concerns regarding infusion and/or medication(s) being administered.    Patients port accessed using non-coring, 19 gauge, 3/4 inch needle. Port accessed per facility protocol. Port flushed easily, blood return noted.  No signs and symptoms of infection or infiltration.      1405 IV pump verified with IVF dose, drug, and rate of administration.  Infusion administered per protocol.  Patient tolerated infusion well, no signs or symptoms of adverse reaction noted.  Patient denies pain nor discomfort.     IV removed, catheter intact.  Site clean, dry and intact.  No signs or symptoms of infiltration or infection.  Covered with a sterile bandage, slight pressure applied for 30 seconds.  Pt instructed to leave bandage intact for a minimum of one hour, and to call with questions or concerns.  Copy of appointments, discharge instructions, and after visit summary (AVS) provided to patient.  Patient states understanding, discharged.

## 2023-01-05 NOTE — PATIENT INSTRUCTIONS

## 2023-01-06 ENCOUNTER — RESULTS ONLY (OUTPATIENT)
Dept: RADIATION ONCOLOGY | Facility: HOSPITAL | Age: 63
End: 2023-01-06

## 2023-01-06 LAB
RAD ONC ARIA COURSE ID: NORMAL
RAD ONC ARIA COURSE LAST TREATMENT DATE: NORMAL
RAD ONC ARIA COURSE START DATE: NORMAL
RAD ONC ARIA COURSE TREATMENT ELAPSED DAYS: 38
RAD ONC ARIA FIRST TREATMENT DATE: NORMAL
RAD ONC ARIA PLAN FRACTIONS TREATED TO DATE: 24
RAD ONC ARIA PLAN ID: NORMAL
RAD ONC ARIA PLAN PRESCRIBED DOSE PER FRACTION: 2 GY
RAD ONC ARIA PLAN TOTAL FRACTIONS PRESCRIBED: 35
RAD ONC ARIA PLAN TOTAL PRESCRIBED DOSE: 7000 CGY
RAD ONC ARIA REFERENCE POINT DOSAGE GIVEN TO DATE: NORMAL GY
RAD ONC ARIA REFERENCE POINT DOSAGE GIVEN TO DATE: NORMAL GY
RAD ONC ARIA REFERENCE POINT ID: NORMAL
RAD ONC ARIA REFERENCE POINT ID: NORMAL

## 2023-01-09 ENCOUNTER — LAB (OUTPATIENT)
Dept: ONCOLOGY | Facility: OTHER | Age: 63
End: 2023-01-09
Attending: NURSE PRACTITIONER
Payer: COMMERCIAL

## 2023-01-09 ENCOUNTER — APPOINTMENT (OUTPATIENT)
Dept: RADIATION ONCOLOGY | Facility: HOSPITAL | Age: 63
End: 2023-01-09
Attending: NURSE PRACTITIONER
Payer: COMMERCIAL

## 2023-01-09 ENCOUNTER — RESULTS ONLY (OUTPATIENT)
Dept: RADIATION ONCOLOGY | Facility: HOSPITAL | Age: 63
End: 2023-01-09

## 2023-01-09 VITALS
SYSTOLIC BLOOD PRESSURE: 130 MMHG | RESPIRATION RATE: 17 BRPM | HEIGHT: 69 IN | HEART RATE: 71 BPM | BODY MASS INDEX: 32.72 KG/M2 | WEIGHT: 220.9 LBS | DIASTOLIC BLOOD PRESSURE: 72 MMHG | OXYGEN SATURATION: 96 % | TEMPERATURE: 98.1 F

## 2023-01-09 DIAGNOSIS — K59.03 DRUG INDUCED CONSTIPATION: ICD-10-CM

## 2023-01-09 DIAGNOSIS — K12.1 STOMATITIS AND MUCOSITIS: ICD-10-CM

## 2023-01-09 DIAGNOSIS — T45.1X5A CHEMOTHERAPY-INDUCED NAUSEA: ICD-10-CM

## 2023-01-09 DIAGNOSIS — R11.0 CHEMOTHERAPY-INDUCED NAUSEA: ICD-10-CM

## 2023-01-09 DIAGNOSIS — K12.30 STOMATITIS AND MUCOSITIS: ICD-10-CM

## 2023-01-09 DIAGNOSIS — C09.9 MALIGNANT NEOPLASM OF TONSIL (H): Primary | ICD-10-CM

## 2023-01-09 DIAGNOSIS — Z51.11 ENCOUNTER FOR ANTINEOPLASTIC CHEMOTHERAPY: ICD-10-CM

## 2023-01-09 DIAGNOSIS — C09.9 MALIGNANT NEOPLASM OF TONSIL (H): ICD-10-CM

## 2023-01-09 DIAGNOSIS — Z51.11 ENCOUNTER FOR ANTINEOPLASTIC CHEMOTHERAPY: Primary | ICD-10-CM

## 2023-01-09 LAB
ALBUMIN SERPL BCG-MCNC: 4 G/DL (ref 3.5–5.2)
ALP SERPL-CCNC: 78 U/L (ref 40–129)
ALT SERPL W P-5'-P-CCNC: 29 U/L (ref 10–50)
ANION GAP SERPL CALCULATED.3IONS-SCNC: 8 MMOL/L (ref 7–15)
AST SERPL W P-5'-P-CCNC: 28 U/L (ref 10–50)
BASOPHILS # BLD AUTO: 0 10E3/UL (ref 0–0.2)
BASOPHILS NFR BLD AUTO: 0 %
BILIRUB SERPL-MCNC: 0.4 MG/DL
BUN SERPL-MCNC: 21.8 MG/DL (ref 8–23)
CALCIUM SERPL-MCNC: 9.2 MG/DL (ref 8.8–10.2)
CHLORIDE SERPL-SCNC: 102 MMOL/L (ref 98–107)
CREAT SERPL-MCNC: 0.74 MG/DL (ref 0.67–1.17)
DEPRECATED HCO3 PLAS-SCNC: 28 MMOL/L (ref 22–29)
EOSINOPHIL # BLD AUTO: 0 10E3/UL (ref 0–0.7)
EOSINOPHIL NFR BLD AUTO: 0 %
ERYTHROCYTE [DISTWIDTH] IN BLOOD BY AUTOMATED COUNT: 13.7 % (ref 10–15)
GFR SERPL CREATININE-BSD FRML MDRD: >90 ML/MIN/1.73M2
GLUCOSE SERPL-MCNC: 99 MG/DL (ref 70–99)
HCT VFR BLD AUTO: 36.8 % (ref 40–53)
HGB BLD-MCNC: 13.1 G/DL (ref 13.3–17.7)
IMM GRANULOCYTES # BLD: 0 10E3/UL
IMM GRANULOCYTES NFR BLD: 0 %
LYMPHOCYTES # BLD AUTO: 0.7 10E3/UL (ref 0.8–5.3)
LYMPHOCYTES NFR BLD AUTO: 14 %
MAGNESIUM SERPL-MCNC: 2 MG/DL (ref 1.7–2.3)
MCH RBC QN AUTO: 32 PG (ref 26.5–33)
MCHC RBC AUTO-ENTMCNC: 35.6 G/DL (ref 31.5–36.5)
MCV RBC AUTO: 90 FL (ref 78–100)
MONOCYTES # BLD AUTO: 0.5 10E3/UL (ref 0–1.3)
MONOCYTES NFR BLD AUTO: 10 %
NEUTROPHILS # BLD AUTO: 3.6 10E3/UL (ref 1.6–8.3)
NEUTROPHILS NFR BLD AUTO: 76 %
NRBC # BLD AUTO: 0 10E3/UL
NRBC BLD AUTO-RTO: 0 /100
PLATELET # BLD AUTO: 141 10E3/UL (ref 150–450)
POTASSIUM SERPL-SCNC: 4.4 MMOL/L (ref 3.4–5.3)
PROT SERPL-MCNC: 6.5 G/DL (ref 6.4–8.3)
RAD ONC ARIA COURSE ID: NORMAL
RAD ONC ARIA COURSE LAST TREATMENT DATE: NORMAL
RAD ONC ARIA COURSE START DATE: NORMAL
RAD ONC ARIA COURSE TREATMENT ELAPSED DAYS: 41
RAD ONC ARIA FIRST TREATMENT DATE: NORMAL
RAD ONC ARIA PLAN FRACTIONS TREATED TO DATE: 25
RAD ONC ARIA PLAN ID: NORMAL
RAD ONC ARIA PLAN PRESCRIBED DOSE PER FRACTION: 2 GY
RAD ONC ARIA PLAN TOTAL FRACTIONS PRESCRIBED: 35
RAD ONC ARIA PLAN TOTAL PRESCRIBED DOSE: 7000 CGY
RAD ONC ARIA REFERENCE POINT DOSAGE GIVEN TO DATE: NORMAL GY
RAD ONC ARIA REFERENCE POINT DOSAGE GIVEN TO DATE: NORMAL GY
RAD ONC ARIA REFERENCE POINT ID: NORMAL
RAD ONC ARIA REFERENCE POINT ID: NORMAL
RBC # BLD AUTO: 4.09 10E6/UL (ref 4.4–5.9)
SODIUM SERPL-SCNC: 138 MMOL/L (ref 136–145)
WBC # BLD AUTO: 4.8 10E3/UL (ref 4–11)

## 2023-01-09 PROCEDURE — 99215 OFFICE O/P EST HI 40 MIN: CPT | Performed by: NURSE PRACTITIONER

## 2023-01-09 PROCEDURE — 36591 DRAW BLOOD OFF VENOUS DEVICE: CPT | Performed by: NURSE PRACTITIONER

## 2023-01-09 PROCEDURE — 77336 RADIATION PHYSICS CONSULT: CPT | Performed by: RADIOLOGY

## 2023-01-09 PROCEDURE — 77014 PR CT GUIDE FOR PLACEMENT RADIATION THERAPY FIELDS: CPT | Mod: 26 | Performed by: RADIOLOGY

## 2023-01-09 PROCEDURE — 96523 IRRIG DRUG DELIVERY DEVICE: CPT | Performed by: INTERNAL MEDICINE

## 2023-01-09 PROCEDURE — 83735 ASSAY OF MAGNESIUM: CPT | Performed by: NURSE PRACTITIONER

## 2023-01-09 PROCEDURE — 77014 HC CT GUIDE FOR PLACEMENT RADIATION THERAPY FIELDS: CPT | Performed by: RADIOLOGY

## 2023-01-09 PROCEDURE — 77386 HC IMRT TREATMENT DELIVERY, COMPLEX: CPT | Performed by: RADIOLOGY

## 2023-01-09 PROCEDURE — 85025 COMPLETE CBC W/AUTO DIFF WBC: CPT | Performed by: NURSE PRACTITIONER

## 2023-01-09 PROCEDURE — 77427 RADIATION TX MANAGEMENT X5: CPT | Performed by: RADIOLOGY

## 2023-01-09 PROCEDURE — 80053 COMPREHEN METABOLIC PANEL: CPT | Performed by: NURSE PRACTITIONER

## 2023-01-09 RX ORDER — LORAZEPAM 2 MG/ML
0.5 INJECTION INTRAMUSCULAR EVERY 4 HOURS PRN
Status: CANCELLED | OUTPATIENT
Start: 2023-01-10

## 2023-01-09 RX ORDER — EPINEPHRINE 1 MG/ML
0.3 INJECTION, SOLUTION, CONCENTRATE INTRAVENOUS EVERY 5 MIN PRN
Status: CANCELLED | OUTPATIENT
Start: 2023-01-10

## 2023-01-09 RX ORDER — HEPARIN SODIUM (PORCINE) LOCK FLUSH IV SOLN 100 UNIT/ML 100 UNIT/ML
5 SOLUTION INTRAVENOUS
Status: DISCONTINUED | OUTPATIENT
Start: 2023-01-09 | End: 2023-01-09 | Stop reason: HOSPADM

## 2023-01-09 RX ORDER — PALONOSETRON 0.05 MG/ML
0.25 INJECTION, SOLUTION INTRAVENOUS ONCE
Status: CANCELLED | OUTPATIENT
Start: 2023-01-10

## 2023-01-09 RX ORDER — HEPARIN SODIUM (PORCINE) LOCK FLUSH IV SOLN 100 UNIT/ML 100 UNIT/ML
5 SOLUTION INTRAVENOUS
Status: CANCELLED | OUTPATIENT
Start: 2023-01-09

## 2023-01-09 RX ORDER — NYSTATIN 100000/ML
500000 SUSPENSION, ORAL (FINAL DOSE FORM) ORAL 4 TIMES DAILY
Qty: 400 ML | Refills: 0 | Status: SHIPPED | OUTPATIENT
Start: 2023-01-09 | End: 2023-01-30

## 2023-01-09 RX ORDER — METHYLPREDNISOLONE SODIUM SUCCINATE 125 MG/2ML
125 INJECTION, POWDER, LYOPHILIZED, FOR SOLUTION INTRAMUSCULAR; INTRAVENOUS
Status: CANCELLED
Start: 2023-01-10

## 2023-01-09 RX ORDER — HEPARIN SODIUM,PORCINE 10 UNIT/ML
5 VIAL (ML) INTRAVENOUS
Status: CANCELLED | OUTPATIENT
Start: 2023-01-10

## 2023-01-09 RX ORDER — MEPERIDINE HYDROCHLORIDE 25 MG/ML
25 INJECTION INTRAMUSCULAR; INTRAVENOUS; SUBCUTANEOUS EVERY 30 MIN PRN
Status: CANCELLED | OUTPATIENT
Start: 2023-01-10

## 2023-01-09 RX ORDER — HEPARIN SODIUM (PORCINE) LOCK FLUSH IV SOLN 100 UNIT/ML 100 UNIT/ML
5 SOLUTION INTRAVENOUS
Status: CANCELLED | OUTPATIENT
Start: 2023-01-10

## 2023-01-09 RX ORDER — ALBUTEROL SULFATE 90 UG/1
1-2 AEROSOL, METERED RESPIRATORY (INHALATION)
Status: CANCELLED
Start: 2023-01-10

## 2023-01-09 RX ORDER — ALBUTEROL SULFATE 0.83 MG/ML
2.5 SOLUTION RESPIRATORY (INHALATION)
Status: CANCELLED | OUTPATIENT
Start: 2023-01-10

## 2023-01-09 RX ORDER — DIPHENHYDRAMINE HYDROCHLORIDE 50 MG/ML
50 INJECTION INTRAMUSCULAR; INTRAVENOUS
Status: CANCELLED
Start: 2023-01-10

## 2023-01-09 RX ADMIN — HEPARIN SODIUM (PORCINE) LOCK FLUSH IV SOLN 100 UNIT/ML 5 ML: 100 SOLUTION at 09:04

## 2023-01-09 ASSESSMENT — ENCOUNTER SYMPTOMS
FEVER: 0
PALPITATIONS: 0
CHEST TIGHTNESS: 0
CHILLS: 0
SHORTNESS OF BREATH: 1

## 2023-01-09 ASSESSMENT — PAIN SCALES - GENERAL: PAINLEVEL: NO PAIN (0)

## 2023-01-09 NOTE — PROGRESS NOTES
Patients Right port accessed using non-coring, 19 gauge, 3/4 inch needle. Port accessed per facility protocol.  Hand hygiene performed: yes   Mask donned by caregiver: yes Site prepped with CHG: yes Labs drawn: yes Dressing applied using aseptic technique: yes Port flushed easily, without resistance. Flushed with 10 cc's normal saline.   Immediate blood return noted. 10 cc blood discarded.  2 vials blood draw and sent to lab for results.  Port flushed with 20 cc 0.9% normal saline and 5 cc heparinized saline.  Needle removed intact, sterile dressing applied.  Slight pressure applied for 30 seconds.   Patient tolerated port flush well, denies pain nor discomfort at this time. Patient instructed to leave dressing intact for a minimum of one hour, and to call with questions or concerns.  Patient states understanding and is in agreement with this plan. Patient discharged ambulatory.

## 2023-01-09 NOTE — NURSING NOTE
"Oncology Rooming Note    January 9, 2023 9:58 AM   Melchor Sadler is a 62 year old male who presents for:    Chief Complaint   Patient presents with     Oncology Clinic Visit     Follow up. Malignant neoplasm of tonsil      Initial Vitals: /72   Pulse 71   Temp 98.1  F (36.7  C) (Tympanic)   Resp 17   Ht 1.753 m (5' 9\")   Wt 100.2 kg (220 lb 14.4 oz)   SpO2 96%   BMI 32.62 kg/m   Estimated body mass index is 32.62 kg/m  as calculated from the following:    Height as of this encounter: 1.753 m (5' 9\").    Weight as of this encounter: 100.2 kg (220 lb 14.4 oz). Body surface area is 2.21 meters squared.  No Pain (0) Comment: Data Unavailable   No LMP for male patient.  Allergies reviewed: Yes  Medications reviewed: Yes    Medications: Medication refills not needed today.  Pharmacy name entered into [employer]: [facility] - [address]    Clinical concerns: none       Ángela Clark LPN              "

## 2023-01-09 NOTE — PATIENT INSTRUCTIONS

## 2023-01-09 NOTE — PROGRESS NOTES
"Progress Notes  Encounter Date: Delta 10, 2023  Jeramie Figueroa MD     RADIATION ONCOLOGY WEEKLY MANAGEMENT PROGRESS NOTE     Patient Care Team       Relationship Specialty Notifications Start End    No Ref-Primary, Physician PCP - General   11/14/22     Fax: [phone]         Maryam Cannon MD Assigned PCP   9/24/22     Phone: [phone] Fax: [phone]         [address] [place] [address]    Vicki Alaniz MD Assigned Cancer Care Provider   11/26/22     Phone: [phone] Fax: [phone]         [address] [place] [address]    Isabelle Alamo, RN Specialty Care Coordinator Hematology & Oncology Admissions 12/13/22     Ángela Contreras RN Specialty Care Coordinator Hematology & Oncology All results, Admissions 12/13/22     Phone: [phone] Fax: [phone]         [facility] [address] [place] [address]            DIAGNOSIS:  Cancer Staging   Malignant neoplasm of tonsil (H)  Staging form: Pharynx - Oropharynx, AJCC 8th Edition  - Clinical stage from 11/16/2022: Stage I (cT1, cN1, cM0, p16+) - Signed by Vicki Alaniz MD on 11/16/2022    RADIATION THERAPY:    Melchor Sadler has received 5200 c Gy to date.   Treatment 26 of 35 fractions  Total planned dose 7000 cGy      SUBJECTIVE:    Using baking soda/salt rinse, magic mouth wash, and after sun Aloe Vera.  Attempted to use Aquaphor but was \"uncomfortable\" and \"felt like insulation on my neck\". Stopped using Nystatin. States cough is the same as last week.Continues to have thick mucous and he believes this is what is causing him to cough. Stable fatigue. Has been doing more liquids than solid foods due to difficulty with swallowing and sores in mouth.    OBJECTIVE:  Oral cavity reddened but no breakdown. Moderate erythema left neck.  Weight down to his usual weight.  WEIGHT: 218.80lbs.  Examination reveals moderate erythema of neck, dry desquamation present to left side of neck, no open areas.  Tonsil flat.  " Small oral lesions/ sores present. Lymph node about 1-1.5 cm and soft.      IMPRESSION:  Routine tolerance to radiation therapy.       PLAN:  Continue treatment as planned. Can try mucinex to help thin secretions/ help with cough caused by thick secretions. Push fluids. Increase skin care, apply skin cream to QID, start Aquaphor.        Medical record and imaging reviewed.    Bety Figueroa MD

## 2023-01-09 NOTE — PATIENT INSTRUCTIONS
Thank you for allowing me to be a part of your care today.    I would like to see you back in 1 week. I will plan on seeing you Monday, with labs prior, prior to your radiation visit.     If you have any questions please call [phone]    Other instructions:      None

## 2023-01-10 ENCOUNTER — DOCUMENTATION ONLY (OUTPATIENT)
Dept: PULMONOLOGY | Facility: OTHER | Age: 63
End: 2023-01-10

## 2023-01-10 ENCOUNTER — OFFICE VISIT (OUTPATIENT)
Dept: RADIATION ONCOLOGY | Facility: HOSPITAL | Age: 63
End: 2023-01-10
Payer: COMMERCIAL

## 2023-01-10 ENCOUNTER — INFUSION THERAPY VISIT (OUTPATIENT)
Dept: INFUSION THERAPY | Facility: OTHER | Age: 63
End: 2023-01-10
Attending: NURSE PRACTITIONER
Payer: COMMERCIAL

## 2023-01-10 ENCOUNTER — RESULTS ONLY (OUTPATIENT)
Dept: RADIATION ONCOLOGY | Facility: HOSPITAL | Age: 63
End: 2023-01-10

## 2023-01-10 VITALS
TEMPERATURE: 98.9 F | RESPIRATION RATE: 24 BRPM | OXYGEN SATURATION: 95 % | WEIGHT: 218.8 LBS | SYSTOLIC BLOOD PRESSURE: 114 MMHG | HEART RATE: 84 BPM | DIASTOLIC BLOOD PRESSURE: 74 MMHG | BODY MASS INDEX: 32.31 KG/M2

## 2023-01-10 VITALS — HEART RATE: 71 BPM | SYSTOLIC BLOOD PRESSURE: 119 MMHG | DIASTOLIC BLOOD PRESSURE: 68 MMHG

## 2023-01-10 DIAGNOSIS — C09.9 MALIGNANT NEOPLASM OF TONSIL (H): Primary | ICD-10-CM

## 2023-01-10 DIAGNOSIS — C09.9 MALIGNANT NEOPLASM OF TONSIL (H): ICD-10-CM

## 2023-01-10 DIAGNOSIS — Z51.11 ENCOUNTER FOR ANTINEOPLASTIC CHEMOTHERAPY: Primary | ICD-10-CM

## 2023-01-10 LAB
RAD ONC ARIA COURSE ID: NORMAL
RAD ONC ARIA COURSE LAST TREATMENT DATE: NORMAL
RAD ONC ARIA COURSE START DATE: NORMAL
RAD ONC ARIA COURSE TREATMENT ELAPSED DAYS: 42
RAD ONC ARIA FIRST TREATMENT DATE: NORMAL
RAD ONC ARIA PLAN FRACTIONS TREATED TO DATE: 26
RAD ONC ARIA PLAN ID: NORMAL
RAD ONC ARIA PLAN PRESCRIBED DOSE PER FRACTION: 2 GY
RAD ONC ARIA PLAN TOTAL FRACTIONS PRESCRIBED: 35
RAD ONC ARIA PLAN TOTAL PRESCRIBED DOSE: 7000 CGY
RAD ONC ARIA REFERENCE POINT DOSAGE GIVEN TO DATE: NORMAL GY
RAD ONC ARIA REFERENCE POINT DOSAGE GIVEN TO DATE: NORMAL GY
RAD ONC ARIA REFERENCE POINT ID: NORMAL
RAD ONC ARIA REFERENCE POINT ID: NORMAL

## 2023-01-10 PROCEDURE — 96413 CHEMO IV INFUSION 1 HR: CPT | Performed by: INTERNAL MEDICINE

## 2023-01-10 PROCEDURE — 96375 TX/PRO/DX INJ NEW DRUG ADDON: CPT | Performed by: INTERNAL MEDICINE

## 2023-01-10 PROCEDURE — 96367 TX/PROPH/DG ADDL SEQ IV INF: CPT | Performed by: INTERNAL MEDICINE

## 2023-01-10 RX ORDER — HEPARIN SODIUM (PORCINE) LOCK FLUSH IV SOLN 100 UNIT/ML 100 UNIT/ML
5 SOLUTION INTRAVENOUS
Status: DISCONTINUED | OUTPATIENT
Start: 2023-01-10 | End: 2023-01-10 | Stop reason: HOSPADM

## 2023-01-10 RX ORDER — PALONOSETRON 0.05 MG/ML
0.25 INJECTION, SOLUTION INTRAVENOUS ONCE
Status: COMPLETED | OUTPATIENT
Start: 2023-01-10 | End: 2023-01-10

## 2023-01-10 RX ADMIN — PALONOSETRON 0.25 MG: 0.05 INJECTION, SOLUTION INTRAVENOUS at 08:58

## 2023-01-10 RX ADMIN — Medication 1000 ML: at 08:58

## 2023-01-10 RX ADMIN — HEPARIN SODIUM (PORCINE) LOCK FLUSH IV SOLN 100 UNIT/ML 5 ML: 100 SOLUTION at 11:10

## 2023-01-10 ASSESSMENT — PAIN SCALES - GENERAL: PAINLEVEL: MILD PAIN (2)
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
## 2023-01-10 NOTE — PROGRESS NOTES
"Chart review prior to sleep testing.    Patient Summary:  62 year old yo male who is referred for re-establishing care with need for updated PAP supplies and equipment.    Patient Active Problem List    Diagnosis Date Noted     Encounter for antineoplastic chemotherapy 12/07/2022     Priority: Medium     Malignant neoplasm of tonsil (H) 10/26/2022     Priority: Medium     Family history of diverticulitis of colon 12/14/2016     Priority: Medium     Insomnia 03/05/2015     Priority: Medium       Current Outpatient Medications   Medication     magic mouthwash (ENTER INGREDIENTS IN COMMENTS) suspension     magnesium oxide (MAG-OX) 400 MG tablet     NONFORMULARY     NONFORMULARY     nystatin (MYCOSTATIN) 750594 UNIT/ML suspension     ondansetron (ZOFRAN) 8 MG tablet     prochlorperazine (COMPAZINE) 10 MG tablet     UNKNOWN TO PATIENT     zinc gluconate 50 MG tablet     No current facility-administered medications for this visit.     Facility-Administered Medications Ordered in Other Visits   Medication     heparin 100 UNIT/ML injection 5 mL     sodium chloride (PF) 0.9% PF flush 3-20 mL       Pertinent PMHx of GLENDA, tonsillar cancer.    Report of sleep testing ~4/2009, though I did not have results for review.    I did not have current PAP download for review, current PAP settings are unknown.    STOP-BANG score of 6, with unknown neck circumference.  Pepin score of 11.  BMI of Estimated body mass index is 32.31 kg/m  as calculated from the following:    Height as of 1/9/23: 1.753 m (5' 9\").    Weight as of an earlier encounter on 1/10/23: 99.2 kg (218 lb 12.8 oz).     Per questionnaire: \"I have sleep apnea and in need of a  new machine\"    Caffeine use:  No for 3+ per day.  No for within 6 hours of bed.    Tobacco use: No    Sleep pattern:  Workdays.  10pm - 5am, total sleep time 7 hours.  Weekends.  10pm - 7:50am, total sleep time 7 hours.  Time to fall asleep: ~15 minutes.  Awakenings: 3-4 times per night, 5 minutes " to return to sleep, awake for total of 1 hours per night.  Napping.  1 days per week, 2 hours per nap.    Yes for RLS screen.  No for sleep walking.  No for dream enactment behavior.  No for bruxism.    Yes for morning headaches.  Yes for snoring.  Yes for observed apnea.  Yes for FHx of GLENDA.    SHx:  , lives with wife and son.    A/P:    1.)  History of GLENDA (unknown severity, testing ~4/2009)  2.)  Using PAP therapy, unknown settings  3.)  Recent diagnosis of tonsillar cancer (Dx'd ~9/2022, start of XRT 11/28/2022 and chemo 11/29/2022)  - If we can obtain prior sleep testing and review CPAP download, then I would be comfortable with new equipment.  - Potential for changes in GLENDA severity given radiation to oropharynx for tonsillar cancer.  - If are required to repeat testing for new equipment, would appear to be candidate for either home sleep testing or in-lab PSG.    ---  This note was written with the assistance of the Dragon voice-dictation technology software. The final document, although reviewed, may contain errors. For corrections, please contact the office.    Rich Paez MD    Sleep Medicine    [facility] [place], [place]  o Main Office: [phone]    [facility] [place], [place]  o [address] [facility], [address]  o Schedule visits: [phone]  o Main Office: [phone]  o Fax: [phone]

## 2023-01-10 NOTE — PROGRESS NOTES
Patient has paperwork for sleep lab, notes they wanted a call when he arrived and they would come down to retrieve it. Call to sleep lab and alerted.

## 2023-01-10 NOTE — Clinical Note
Questionnaire ynes for review This khadar needs a new machine  his test was at [facility] in April 09  Can not get a down load from his machine

## 2023-01-10 NOTE — PROGRESS NOTES
Patient is 62 years old, here today for infusion of Cisplatin.      Power port accessed with 19 gauge 3/4 inch non-coring needle.  Line flushed with 10 cc's normal saline.  Needle secured with sterile transparent dressing.  10 cc's blood discarded, and blood taken for ordered labs.  Patient tolerated well.  Denies pain and discomfort at this time.  Port flushes easily without resistance.    Hand hygiene performed: yes   Mask donned by caregiver: yes Site prepped with CHG: yes Labs drawn: no Dressing applied using aseptic technique: yes     Component      Latest Ref Rng & Units 1/9/2023   WBC      4.0 - 11.0 10e3/uL 4.8   RBC Count      4.40 - 5.90 10e6/uL 4.09 (L)   Hemoglobin      13.3 - 17.7 g/dL 13.1 (L)   Hematocrit      40.0 - 53.0 % 36.8 (L)   MCV      78 - 100 fL 90   MCH      26.5 - 33.0 pg 32.0   MCHC      31.5 - 36.5 g/dL 35.6   RDW      10.0 - 15.0 % 13.7   Platelet Count      150 - 450 10e3/uL 141 (L)   % Neutrophils      % 76   % Lymphocytes      % 14   % Monocytes      % 10   % Eosinophils      % 0   % Basophils      % 0   % Immature Granulocytes      % 0   NRBCs per 100 WBC      <1 /100 0   Absolute Neutrophils      1.6 - 8.3 10e3/uL 3.6   Absolute Lymphocytes      0.8 - 5.3 10e3/uL 0.7 (L)   Absolute Monocytes      0.0 - 1.3 10e3/uL 0.5   Absolute Eosinophils      0.0 - 0.7 10e3/uL 0.0   Absolute Basophils      0.0 - 0.2 10e3/uL 0.0   Absolute Immature Granulocytes      <=0.4 10e3/uL 0.0   Absolute NRBCs      10e3/uL 0.0   Sodium      136 - 145 mmol/L 138   Potassium      3.4 - 5.3 mmol/L 4.4   Chloride      98 - 107 mmol/L 102   Carbon Dioxide (CO2)      22 - 29 mmol/L 28   Anion Gap      7 - 15 mmol/L 8   Urea Nitrogen      8.0 - 23.0 mg/dL 21.8   Creatinine      0.67 - 1.17 mg/dL 0.74   Calcium      8.8 - 10.2 mg/dL 9.2   Glucose      70 - 99 mg/dL 99   Alkaline Phosphatase      40 - 129 U/L 78   AST      10 - 50 U/L 28   ALT      10 - 50 U/L 29   Protein Total      6.4 - 8.3 g/dL 6.5   Albumin       3.5 - 5.2 g/dL 4.0   Bilirubin Total      <=1.2 mg/dL 0.4   GFR Estimate      >60 mL/min/1.73m2 >90   Magnesium      1.7 - 2.3 mg/dL 2.0       Patient seen in ONC yesterday by provider Renetta Mendez NP. Denies any changes or concerns since that appt. Meds and allergies reviewed in RAD ONC this am, reviewed by this nurse before treating. Vitals done in RAD ONC this am, reviewed by this nurse before treating.     Patient meets parameters for today's infusion.  Denies questions or concerns regarding today's infusion and/or medications being administered.        Patient identified with two identifiers, order verified, and verbal consent for today's infusion obtained from patient.     0957 IV pump verified with Cisplatin dose, drug, and rate of administration. Infusion administered per protocol.     Discharged by Deb CORONADO, see her charting/notes for more.

## 2023-01-10 NOTE — PROGRESS NOTES
STOP BANG       Name: Melchor Sadler MRN# [id number]   Age: 62 year old YOB: 1960     Stop Bang questionnaire completed with a score of >3 to allow for HST     Have you been told you snore loudly (louder than talking or loud enough to be heard through doors)? YES    Do you often feel tired, fatigued, or sleepy during the daytime? YES    Has anyone observed you stop breathing during your sleep? YES    Do you have or are you being treated for high blood pressure? NO    Is your BMI greater than 35? NO    Is your neck size circumference 16 inches or greater? YES 17.5    Are you over 50 years old? YES    Stop Bang Score (# of yes): 6
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
## 2023-01-11 ENCOUNTER — APPOINTMENT (OUTPATIENT)
Dept: RADIATION ONCOLOGY | Facility: HOSPITAL | Age: 63
End: 2023-01-11
Payer: COMMERCIAL

## 2023-01-11 ENCOUNTER — RESULTS ONLY (OUTPATIENT)
Dept: RADIATION ONCOLOGY | Facility: HOSPITAL | Age: 63
End: 2023-01-11

## 2023-01-11 LAB
RAD ONC ARIA COURSE ID: NORMAL
RAD ONC ARIA COURSE LAST TREATMENT DATE: NORMAL
RAD ONC ARIA COURSE START DATE: NORMAL
RAD ONC ARIA COURSE TREATMENT ELAPSED DAYS: 43
RAD ONC ARIA FIRST TREATMENT DATE: NORMAL
RAD ONC ARIA PLAN FRACTIONS TREATED TO DATE: 27
RAD ONC ARIA PLAN ID: NORMAL
RAD ONC ARIA PLAN PRESCRIBED DOSE PER FRACTION: 2 GY
RAD ONC ARIA PLAN TOTAL FRACTIONS PRESCRIBED: 35
RAD ONC ARIA PLAN TOTAL PRESCRIBED DOSE: 7000 CGY
RAD ONC ARIA REFERENCE POINT DOSAGE GIVEN TO DATE: NORMAL GY
RAD ONC ARIA REFERENCE POINT DOSAGE GIVEN TO DATE: NORMAL GY
RAD ONC ARIA REFERENCE POINT ID: NORMAL
RAD ONC ARIA REFERENCE POINT ID: NORMAL

## 2023-01-11 PROCEDURE — 77386 HC IMRT TREATMENT DELIVERY, COMPLEX: CPT | Performed by: RADIOLOGY

## 2023-01-11 PROCEDURE — 77014 HC CT GUIDE FOR PLACEMENT RADIATION THERAPY FIELDS: CPT | Performed by: RADIOLOGY

## 2023-01-11 PROCEDURE — 77014 PR CT GUIDE FOR PLACEMENT RADIATION THERAPY FIELDS: CPT | Mod: 26 | Performed by: RADIOLOGY

## 2023-01-11 NOTE — PROGRESS NOTES
Oncology Treatment Followup    Reason for Visit:  Melchor is a 62 year old gentleman with a diagnosis of tonsillar cancer who presents today for consideration of ongoing chemotherapy. He is due for D50 Cisplatin.     Nursing Note and documentation reviewed: Yes    HPI:    Melchor is doing okay. He continues to deal with ongoing mouth sores. Using salt and soda and magic mouthwash. Also using orajel. Mouth is dry. He has an appetite and is hungry, but pain prevents him from eating a lot. He hasn't really tried anything for pain. Has lost another 6 #. Last chemo tomorrow and XRT through next Monday.     He denies signs of infection. No fever, chills, chest pain, cough, or SOB. Occasional bleeding of mouth sores, but no other bleeding concerns. Intermittent nausea but not really using nausea medications. Has been dealing with constipation. Using Senna-S two tablets daily. Notes that his energy levels have been alright. Gets tired and naps but this has allowed him to remain active. Does have some ongoing skin breakdown to left neck that weeps at times but no evidence of infection.     Oncologic History:   05/2021 First noticed left neck mass  08/16/2022 Seen by PCP, with complaints of left growing neck mass. CT ordered.   08/23/2022 CT soft tissue neck mass: 3.8 cm probable everadro mass in the left jugulodigastric chain. Adjacent also enlarged 2.3 cm long axis node. Differential considerations favor  metastatic disease. A mucosal mass is questioned at the roof of the left tonsillar pillar.  09/13/2022 Evaluated by Dr. Houston at [facility] who recommended perry endoscopy with biopsy of the left tonsil  09/22/2022 Underwent Panendoscopy with path showing poorly differentiated squamous carcinoma, p16 positive.   10/04/2022 Seen again by Dr. Houston who recommended PET scan and referral to radiation oncology and medical oncology for discussion regarding concurrent chemoRT.   10/26/2022 Met in consultation with Dr. Restrepo of [facility],  who at the time, states his radiotherapy plan would be 70 Gy delivered in 35 fractions of 2 Gy each. Ipsilateral vs bilateral neck coverage is TBD by results of PET CT.   11/2/2022 PET scan IMPRESSION: Large left anterior cervical chain lymph node metastasis measuring 4.0 x 3.5 cm in dimension. There are a few mildly enlarged adjacent left anterior cervical chain lymph nodes with uptake not significantly greater than that of background uptake. No evidence of distant metastatic disease otherwise.  11/03/2022 Met in consultation with Dr. Pruitt of [facility] who agreed with the the plan of concurrent chemorads, utilizing Cisplatin 40 mg weekly.   11/11/2022 Planned port placement  11/16/2022 Planned Rad Onc simulation  11/28/2022 Commenced XRT treatments  11/29/2022 C1 Cisplatin    Treatment Goal: Curative    Planned TX: Weekly Cisplatin 40 mg/m2 given concurrently with radiation therapy  Current Cycle: C1D50      Previous treatment:  N/A    Past Medical History:   Diagnosis Date     Malignant neoplasm of tonsil (H) 10/04/2022    Per [facility] Chart     Oropharyngeal cancer (H) 09/13/2022    Per [facility] Chart     GLENDA (obstructive sleep apnea) 09/22/2022    Per [facility] chart       Past Surgical History:   Procedure Laterality Date     INSERT PORT VASCULAR ACCESS N/A 11/11/2022    Procedure: Port-a-cath placement;  Surgeon: Teodoro Avila MD;  Location: [facility] OR     Panendoscopy, biopsy left palate  09/22/2022    [facility]-Dr. Jamey Houston       Family History   Problem Relation Age of Onset     Lung Cancer Mother      Cancer Mother      Diabetes Father      Colon Cancer Maternal Grandmother      Hyperlipidemia No family hx of      Coronary Artery Disease No family hx of      Hypertension No family hx of      Breast Cancer No family hx of      Prostate Cancer No family hx of      Depression No family hx of      Anxiety Disorder No family hx of      Mental Illness No family hx of      Anesthesia Reaction No family hx of       Asthma No family hx of        Social History     Socioeconomic History     Marital status:      Spouse name: Sandra     Number of children: 2     Years of education: 16     Highest education level: Not on file   Occupational History     Employer: [employer]     Occupation: ASV   Tobacco Use     Smoking status: Never     Smokeless tobacco: Former     Types: Chew     Quit date: 1995   Vaping Use     Vaping Use: Never used   Substance and Sexual Activity     Alcohol use: Not Currently     Drug use: No     Comment: Drug use: No     Sexual activity: Yes     Partners: Female   Other Topics Concern     Parent/sibling w/ CABG, MI or angioplasty before 65F 55M? Not Asked   Social History Narrative     Not on file     Social Determinants of Health     Financial Resource Strain: Not on file   Food Insecurity: Not on file   Transportation Needs: Not on file   Physical Activity: Not on file   Stress: Not on file   Social Connections: Not on file   Intimate Partner Violence: Not on file   Housing Stability: Not on file       Current Outpatient Medications   Medication     magic mouthwash (ENTER INGREDIENTS IN COMMENTS) suspension     magnesium oxide (MAG-OX) 400 MG tablet     NONFORMULARY     NONFORMULARY     traMADol (ULTRAM) 50 MG tablet     UNKNOWN TO PATIENT     zinc gluconate 50 MG tablet     nystatin (MYCOSTATIN) 341508 UNIT/ML suspension     ondansetron (ZOFRAN) 8 MG tablet     prochlorperazine (COMPAZINE) 10 MG tablet     No current facility-administered medications for this visit.     Facility-Administered Medications Ordered in Other Visits   Medication     heparin 100 UNIT/ML injection 5 mL     sodium chloride (PF) 0.9% PF flush 10-20 mL        Allergies   Allergen Reactions     Azithromycin Other (See Comments)     Panic attacks     Codeine Other (See Comments)     Anxiety     Penicillins Anxiety     Pt states his sister and father are allergic, but he doesn't believe he is. 11/11/2022       Review Of  "Systems:  A complete review of systems is negative except for the above mentioned items in the interval history.     Physical Exam:  /60   Pulse 79   Temp 98.1  F (36.7  C) (Tympanic)   Resp 20   Ht 1.753 m (5' 9\")   Wt 96.2 kg (212 lb 1.3 oz)   SpO2 97%   BMI 31.32 kg/m    GENERAL APPEARANCE: Healthy, alert and in no acute distress.  HEENT: Eyes appear normal without scleral icterus. Extraocular movements intact.  NECK:   Supple with normal range of motion. Errythema to radiation field with some sloughing of skin.  RESP: Lungs clear to auscultation bilaterally, respirations regular and easy.  CARDIOVASCULAR: Regular rate and rhythm. Normal S1, S2; no murmur, gallop, or rub.  ABDOMEN: Soft, non-tender, non-distended. No palpable organomegaly or masses.  MUSCULOSKELETAL: Extremities without gross deformities noted.  NEURO: Alert and oriented x 3.  Gait steady.  PSYCHIATRIC: Mentation and affect appear normal.  Mood appropriate.    Laboratory:  Results for orders placed or performed in visit on 01/16/23   Comprehensive metabolic panel     Status: Abnormal   Result Value Ref Range    Sodium 135 (L) 136 - 145 mmol/L    Potassium 3.7 3.4 - 5.3 mmol/L    Chloride 99 98 - 107 mmol/L    Carbon Dioxide (CO2) 27 22 - 29 mmol/L    Anion Gap 9 7 - 15 mmol/L    Urea Nitrogen 21.1 8.0 - 23.0 mg/dL    Creatinine 0.74 0.67 - 1.17 mg/dL    Calcium 9.3 8.8 - 10.2 mg/dL    Glucose 122 (H) 70 - 99 mg/dL    Alkaline Phosphatase 89 40 - 129 U/L    AST 30 10 - 50 U/L    ALT 34 10 - 50 U/L    Protein Total 6.4 6.4 - 8.3 g/dL    Albumin 4.0 3.5 - 5.2 g/dL    Bilirubin Total 0.8 <=1.2 mg/dL    GFR Estimate >90 >60 mL/min/1.73m2   Magnesium     Status: Normal   Result Value Ref Range    Magnesium 1.8 1.7 - 2.3 mg/dL   CBC with platelets and differential     Status: Abnormal   Result Value Ref Range    WBC Count 3.9 (L) 4.0 - 11.0 10e3/uL    RBC Count 3.79 (L) 4.40 - 5.90 10e6/uL    Hemoglobin 12.2 (L) 13.3 - 17.7 g/dL    " Hematocrit 33.9 (L) 40.0 - 53.0 %    MCV 89 78 - 100 fL    MCH 32.2 26.5 - 33.0 pg    MCHC 36.0 31.5 - 36.5 g/dL    RDW 14.6 10.0 - 15.0 %    Platelet Count 119 (L) 150 - 450 10e3/uL    % Neutrophils 77 %    % Lymphocytes 14 %    % Monocytes 9 %    % Eosinophils 0 %    % Basophils 0 %    % Immature Granulocytes 0 %    NRBCs per 100 WBC 0 <1 /100    Absolute Neutrophils 3.0 1.6 - 8.3 10e3/uL    Absolute Lymphocytes 0.5 (L) 0.8 - 5.3 10e3/uL    Absolute Monocytes 0.3 0.0 - 1.3 10e3/uL    Absolute Eosinophils 0.0 0.0 - 0.7 10e3/uL    Absolute Basophils 0.0 0.0 - 0.2 10e3/uL    Absolute Immature Granulocytes 0.0 <=0.4 10e3/uL    Absolute NRBCs 0.0 10e3/uL   CBC with platelets differential     Status: Abnormal    Narrative    The following orders were created for panel order CBC with platelets differential.  Procedure                               Abnormality         Status                     ---------                               -----------         ------                     CBC with platelets and d...[[id number]]  Abnormal            Final result                 Please view results for these tests on the individual orders.       Imaging Studies:    None this visit    ASSESSMENT/PLAN:    1. 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 Undergoing concurrent chemorads with utilization of Cisplatin. Due for Day 50 treatment tomorrow. Patient experiencing expected side effects this stage of treatment, but overall tolerating reasonably well. Will finish chemo tomorrow and XRT one week from today. I will see him back in one month to assess labs and review SCP. Plan for PET in 3 months with labs, and then follow-up with Dr. Pruitt. I have placed referral to ENT for their role in surveillance.     2. Mucositis Continue salt/soda rinses and well as PRN magic mouthwash. I will send script for Tramadol to help with pain relief to help him eat. I have asked that he notify us if this doesn't  help with pain. Have also encouraged frequent water sips, sugar free gum and candy to help with dryness, avoid alcohol based swishes.     3. Chemo induced constipation Senna-S two tablets twice daily. If no BM by tomorrow after chemo, take Miralax along with Senna-S. Call if no BM by Wednesday. Increase fluids.     4. Chemo induced nausea Continue using PRN.     5. Cancer related pain See #2. Tramadol sent.     I encouraged patient to call with any questions or concerns.    55 minutes spent in the patient's encounter today with time spent in review of the chart along with in chart preparation.  Time was spent in review of the treamtent plan.  Time was also spent obtaining a review of systems and performing a physical exam.  Time was spent in review of all planned medications for treatment with the patient and questions answered.     СЕРГЕЙ Garber [facility]  Medical Oncology

## 2023-01-12 ENCOUNTER — RESULTS ONLY (OUTPATIENT)
Dept: RADIATION ONCOLOGY | Facility: HOSPITAL | Age: 63
End: 2023-01-12

## 2023-01-12 ENCOUNTER — APPOINTMENT (OUTPATIENT)
Dept: RADIATION ONCOLOGY | Facility: HOSPITAL | Age: 63
End: 2023-01-12
Payer: COMMERCIAL

## 2023-01-12 ENCOUNTER — INFUSION THERAPY VISIT (OUTPATIENT)
Dept: INFUSION THERAPY | Facility: OTHER | Age: 63
End: 2023-01-12
Attending: INTERNAL MEDICINE
Payer: COMMERCIAL

## 2023-01-12 VITALS
DIASTOLIC BLOOD PRESSURE: 67 MMHG | RESPIRATION RATE: 20 BRPM | TEMPERATURE: 98 F | SYSTOLIC BLOOD PRESSURE: 108 MMHG | OXYGEN SATURATION: 98 % | HEART RATE: 65 BPM

## 2023-01-12 DIAGNOSIS — Z51.11 ENCOUNTER FOR ANTINEOPLASTIC CHEMOTHERAPY: ICD-10-CM

## 2023-01-12 DIAGNOSIS — C09.9 MALIGNANT NEOPLASM OF TONSIL (H): Primary | ICD-10-CM

## 2023-01-12 LAB
RAD ONC ARIA COURSE ID: NORMAL
RAD ONC ARIA COURSE LAST TREATMENT DATE: NORMAL
RAD ONC ARIA COURSE START DATE: NORMAL
RAD ONC ARIA COURSE TREATMENT ELAPSED DAYS: 44
RAD ONC ARIA FIRST TREATMENT DATE: NORMAL
RAD ONC ARIA PLAN FRACTIONS TREATED TO DATE: 28
RAD ONC ARIA PLAN ID: NORMAL
RAD ONC ARIA PLAN PRESCRIBED DOSE PER FRACTION: 2 GY
RAD ONC ARIA PLAN TOTAL FRACTIONS PRESCRIBED: 35
RAD ONC ARIA PLAN TOTAL PRESCRIBED DOSE: 7000 CGY
RAD ONC ARIA REFERENCE POINT DOSAGE GIVEN TO DATE: NORMAL GY
RAD ONC ARIA REFERENCE POINT DOSAGE GIVEN TO DATE: NORMAL GY
RAD ONC ARIA REFERENCE POINT ID: NORMAL
RAD ONC ARIA REFERENCE POINT ID: NORMAL

## 2023-01-12 PROCEDURE — 77014 HC CT GUIDE FOR PLACEMENT RADIATION THERAPY FIELDS: CPT | Performed by: RADIOLOGY

## 2023-01-12 PROCEDURE — 96360 HYDRATION IV INFUSION INIT: CPT | Performed by: INTERNAL MEDICINE

## 2023-01-12 PROCEDURE — 77014 PR CT GUIDE FOR PLACEMENT RADIATION THERAPY FIELDS: CPT | Mod: 26 | Performed by: RADIOLOGY

## 2023-01-12 PROCEDURE — 77386 HC IMRT TREATMENT DELIVERY, COMPLEX: CPT | Performed by: RADIOLOGY

## 2023-01-12 RX ORDER — HEPARIN SODIUM (PORCINE) LOCK FLUSH IV SOLN 100 UNIT/ML 100 UNIT/ML
5 SOLUTION INTRAVENOUS
Status: CANCELLED | OUTPATIENT
Start: 2023-01-12

## 2023-01-12 RX ORDER — HEPARIN SODIUM (PORCINE) LOCK FLUSH IV SOLN 100 UNIT/ML 100 UNIT/ML
5 SOLUTION INTRAVENOUS
Status: DISCONTINUED | OUTPATIENT
Start: 2023-01-12 | End: 2023-01-12 | Stop reason: HOSPADM

## 2023-01-12 RX ADMIN — Medication 1000 ML: at 14:16

## 2023-01-12 RX ADMIN — HEPARIN SODIUM (PORCINE) LOCK FLUSH IV SOLN 100 UNIT/ML 5 ML: 100 SOLUTION at 15:25

## 2023-01-12 NOTE — PROGRESS NOTES
Patient is 62 years old, here today for infusion of normal saline per order of Dr Pruitt.      Patient identified with two identifiers, order verified, and verbal consent for today's infusion obtained from patient.      Lab values:  n/a      Patient meets order parameters for today's treatment.         Patients port accessed using non-coring, 19 gauge, 3/4 needle. Port accessed per facility protocol. Port flushed easily, blood return noted.  No signs and symptoms of infection or infiltration.      IV pump verified with dose, drug, and rate of administration.  Infusion administered per protocol.

## 2023-01-13 ENCOUNTER — RESULTS ONLY (OUTPATIENT)
Dept: RADIATION ONCOLOGY | Facility: HOSPITAL | Age: 63
End: 2023-01-13

## 2023-01-13 ENCOUNTER — APPOINTMENT (OUTPATIENT)
Dept: RADIATION ONCOLOGY | Facility: HOSPITAL | Age: 63
End: 2023-01-13
Payer: COMMERCIAL

## 2023-01-13 LAB
RAD ONC ARIA COURSE ID: NORMAL
RAD ONC ARIA COURSE LAST TREATMENT DATE: NORMAL
RAD ONC ARIA COURSE START DATE: NORMAL
RAD ONC ARIA COURSE TREATMENT ELAPSED DAYS: 45
RAD ONC ARIA FIRST TREATMENT DATE: NORMAL
RAD ONC ARIA PLAN FRACTIONS TREATED TO DATE: 29
RAD ONC ARIA PLAN ID: NORMAL
RAD ONC ARIA PLAN PRESCRIBED DOSE PER FRACTION: 2 GY
RAD ONC ARIA PLAN TOTAL FRACTIONS PRESCRIBED: 35
RAD ONC ARIA PLAN TOTAL PRESCRIBED DOSE: 7000 CGY
RAD ONC ARIA REFERENCE POINT DOSAGE GIVEN TO DATE: NORMAL GY
RAD ONC ARIA REFERENCE POINT DOSAGE GIVEN TO DATE: NORMAL GY
RAD ONC ARIA REFERENCE POINT ID: NORMAL
RAD ONC ARIA REFERENCE POINT ID: NORMAL

## 2023-01-13 PROCEDURE — 77386 HC IMRT TREATMENT DELIVERY, COMPLEX: CPT | Performed by: RADIOLOGY

## 2023-01-13 PROCEDURE — 77014 PR CT GUIDE FOR PLACEMENT RADIATION THERAPY FIELDS: CPT | Mod: 26 | Performed by: RADIOLOGY

## 2023-01-13 PROCEDURE — 77014 HC CT GUIDE FOR PLACEMENT RADIATION THERAPY FIELDS: CPT | Performed by: RADIOLOGY

## 2023-01-16 ENCOUNTER — RESULTS ONLY (OUTPATIENT)
Dept: RADIATION ONCOLOGY | Facility: HOSPITAL | Age: 63
End: 2023-01-16

## 2023-01-16 ENCOUNTER — LAB (OUTPATIENT)
Dept: ONCOLOGY | Facility: OTHER | Age: 63
End: 2023-01-16
Attending: NURSE PRACTITIONER
Payer: COMMERCIAL

## 2023-01-16 ENCOUNTER — APPOINTMENT (OUTPATIENT)
Dept: RADIATION ONCOLOGY | Facility: HOSPITAL | Age: 63
End: 2023-01-16
Payer: COMMERCIAL

## 2023-01-16 VITALS
BODY MASS INDEX: 31.41 KG/M2 | HEIGHT: 69 IN | TEMPERATURE: 98.1 F | OXYGEN SATURATION: 97 % | HEART RATE: 79 BPM | WEIGHT: 212.08 LBS | SYSTOLIC BLOOD PRESSURE: 104 MMHG | RESPIRATION RATE: 20 BRPM | DIASTOLIC BLOOD PRESSURE: 60 MMHG

## 2023-01-16 DIAGNOSIS — R53.83 OTHER FATIGUE: ICD-10-CM

## 2023-01-16 DIAGNOSIS — K59.03 DRUG-INDUCED CONSTIPATION: ICD-10-CM

## 2023-01-16 DIAGNOSIS — T45.1X5A CHEMOTHERAPY-INDUCED NAUSEA: ICD-10-CM

## 2023-01-16 DIAGNOSIS — K12.30 MUCOSITIS: ICD-10-CM

## 2023-01-16 DIAGNOSIS — Z51.11 ENCOUNTER FOR ANTINEOPLASTIC CHEMOTHERAPY: ICD-10-CM

## 2023-01-16 DIAGNOSIS — C09.9 MALIGNANT NEOPLASM OF TONSIL (H): Primary | ICD-10-CM

## 2023-01-16 DIAGNOSIS — K59.03 DRUG INDUCED CONSTIPATION: ICD-10-CM

## 2023-01-16 DIAGNOSIS — C09.9 MALIGNANT NEOPLASM OF TONSIL (H): ICD-10-CM

## 2023-01-16 DIAGNOSIS — R11.0 CHEMOTHERAPY-INDUCED NAUSEA: ICD-10-CM

## 2023-01-16 DIAGNOSIS — Z51.11 ENCOUNTER FOR ANTINEOPLASTIC CHEMOTHERAPY: Primary | ICD-10-CM

## 2023-01-16 LAB
ALBUMIN SERPL BCG-MCNC: 4 G/DL (ref 3.5–5.2)
ALP SERPL-CCNC: 89 U/L (ref 40–129)
ALT SERPL W P-5'-P-CCNC: 34 U/L (ref 10–50)
ANION GAP SERPL CALCULATED.3IONS-SCNC: 9 MMOL/L (ref 7–15)
AST SERPL W P-5'-P-CCNC: 30 U/L (ref 10–50)
BASOPHILS # BLD AUTO: 0 10E3/UL (ref 0–0.2)
BASOPHILS NFR BLD AUTO: 0 %
BILIRUB SERPL-MCNC: 0.8 MG/DL
BUN SERPL-MCNC: 21.1 MG/DL (ref 8–23)
CALCIUM SERPL-MCNC: 9.3 MG/DL (ref 8.8–10.2)
CHLORIDE SERPL-SCNC: 99 MMOL/L (ref 98–107)
CREAT SERPL-MCNC: 0.74 MG/DL (ref 0.67–1.17)
DEPRECATED HCO3 PLAS-SCNC: 27 MMOL/L (ref 22–29)
EOSINOPHIL # BLD AUTO: 0 10E3/UL (ref 0–0.7)
EOSINOPHIL NFR BLD AUTO: 0 %
ERYTHROCYTE [DISTWIDTH] IN BLOOD BY AUTOMATED COUNT: 14.6 % (ref 10–15)
GFR SERPL CREATININE-BSD FRML MDRD: >90 ML/MIN/1.73M2
GLUCOSE SERPL-MCNC: 122 MG/DL (ref 70–99)
HCT VFR BLD AUTO: 33.9 % (ref 40–53)
HGB BLD-MCNC: 12.2 G/DL (ref 13.3–17.7)
IMM GRANULOCYTES # BLD: 0 10E3/UL
IMM GRANULOCYTES NFR BLD: 0 %
LYMPHOCYTES # BLD AUTO: 0.5 10E3/UL (ref 0.8–5.3)
LYMPHOCYTES NFR BLD AUTO: 14 %
MAGNESIUM SERPL-MCNC: 1.8 MG/DL (ref 1.7–2.3)
MCH RBC QN AUTO: 32.2 PG (ref 26.5–33)
MCHC RBC AUTO-ENTMCNC: 36 G/DL (ref 31.5–36.5)
MCV RBC AUTO: 89 FL (ref 78–100)
MONOCYTES # BLD AUTO: 0.3 10E3/UL (ref 0–1.3)
MONOCYTES NFR BLD AUTO: 9 %
NEUTROPHILS # BLD AUTO: 3 10E3/UL (ref 1.6–8.3)
NEUTROPHILS NFR BLD AUTO: 77 %
NRBC # BLD AUTO: 0 10E3/UL
NRBC BLD AUTO-RTO: 0 /100
PLATELET # BLD AUTO: 119 10E3/UL (ref 150–450)
POTASSIUM SERPL-SCNC: 3.7 MMOL/L (ref 3.4–5.3)
PROT SERPL-MCNC: 6.4 G/DL (ref 6.4–8.3)
RAD ONC ARIA COURSE ID: NORMAL
RAD ONC ARIA COURSE LAST TREATMENT DATE: NORMAL
RAD ONC ARIA COURSE START DATE: NORMAL
RAD ONC ARIA COURSE TREATMENT ELAPSED DAYS: 48
RAD ONC ARIA FIRST TREATMENT DATE: NORMAL
RAD ONC ARIA PLAN FRACTIONS TREATED TO DATE: 30
RAD ONC ARIA PLAN ID: NORMAL
RAD ONC ARIA PLAN PRESCRIBED DOSE PER FRACTION: 2 GY
RAD ONC ARIA PLAN TOTAL FRACTIONS PRESCRIBED: 35
RAD ONC ARIA PLAN TOTAL PRESCRIBED DOSE: 7000 CGY
RAD ONC ARIA REFERENCE POINT DOSAGE GIVEN TO DATE: NORMAL GY
RAD ONC ARIA REFERENCE POINT DOSAGE GIVEN TO DATE: NORMAL GY
RAD ONC ARIA REFERENCE POINT ID: NORMAL
RAD ONC ARIA REFERENCE POINT ID: NORMAL
RBC # BLD AUTO: 3.79 10E6/UL (ref 4.4–5.9)
SODIUM SERPL-SCNC: 135 MMOL/L (ref 136–145)
WBC # BLD AUTO: 3.9 10E3/UL (ref 4–11)

## 2023-01-16 PROCEDURE — 77014 PR CT GUIDE FOR PLACEMENT RADIATION THERAPY FIELDS: CPT | Mod: 26 | Performed by: RADIOLOGY

## 2023-01-16 PROCEDURE — 99215 OFFICE O/P EST HI 40 MIN: CPT | Performed by: NURSE PRACTITIONER

## 2023-01-16 PROCEDURE — 77427 RADIATION TX MANAGEMENT X5: CPT | Performed by: RADIOLOGY

## 2023-01-16 PROCEDURE — 96523 IRRIG DRUG DELIVERY DEVICE: CPT | Performed by: INTERNAL MEDICINE

## 2023-01-16 PROCEDURE — 80053 COMPREHEN METABOLIC PANEL: CPT | Performed by: NURSE PRACTITIONER

## 2023-01-16 PROCEDURE — 85025 COMPLETE CBC W/AUTO DIFF WBC: CPT | Performed by: NURSE PRACTITIONER

## 2023-01-16 PROCEDURE — 77386 HC IMRT TREATMENT DELIVERY, COMPLEX: CPT | Performed by: RADIOLOGY

## 2023-01-16 PROCEDURE — 77336 RADIATION PHYSICS CONSULT: CPT | Performed by: RADIOLOGY

## 2023-01-16 PROCEDURE — 36591 DRAW BLOOD OFF VENOUS DEVICE: CPT | Performed by: NURSE PRACTITIONER

## 2023-01-16 PROCEDURE — 77014 HC CT GUIDE FOR PLACEMENT RADIATION THERAPY FIELDS: CPT | Performed by: RADIOLOGY

## 2023-01-16 PROCEDURE — 83735 ASSAY OF MAGNESIUM: CPT | Performed by: NURSE PRACTITIONER

## 2023-01-16 RX ORDER — HEPARIN SODIUM (PORCINE) LOCK FLUSH IV SOLN 100 UNIT/ML 100 UNIT/ML
5 SOLUTION INTRAVENOUS
Status: CANCELLED | OUTPATIENT
Start: 2023-01-17

## 2023-01-16 RX ORDER — MEPERIDINE HYDROCHLORIDE 25 MG/ML
25 INJECTION INTRAMUSCULAR; INTRAVENOUS; SUBCUTANEOUS EVERY 30 MIN PRN
Status: CANCELLED | OUTPATIENT
Start: 2023-01-17

## 2023-01-16 RX ORDER — EPINEPHRINE 1 MG/ML
0.3 INJECTION, SOLUTION, CONCENTRATE INTRAVENOUS EVERY 5 MIN PRN
Status: CANCELLED | OUTPATIENT
Start: 2023-01-17

## 2023-01-16 RX ORDER — DIPHENHYDRAMINE HYDROCHLORIDE 50 MG/ML
50 INJECTION INTRAMUSCULAR; INTRAVENOUS
Status: CANCELLED
Start: 2023-01-17

## 2023-01-16 RX ORDER — HEPARIN SODIUM (PORCINE) LOCK FLUSH IV SOLN 100 UNIT/ML 100 UNIT/ML
5 SOLUTION INTRAVENOUS
Status: DISCONTINUED | OUTPATIENT
Start: 2023-01-16 | End: 2023-01-16 | Stop reason: HOSPADM

## 2023-01-16 RX ORDER — LORAZEPAM 2 MG/ML
0.5 INJECTION INTRAMUSCULAR EVERY 4 HOURS PRN
Status: CANCELLED | OUTPATIENT
Start: 2023-01-17

## 2023-01-16 RX ORDER — TRAMADOL HYDROCHLORIDE 50 MG/1
50 TABLET ORAL EVERY 6 HOURS PRN
Qty: 30 TABLET | Refills: 0 | Status: SHIPPED | OUTPATIENT
Start: 2023-01-16 | End: 2023-01-30

## 2023-01-16 RX ORDER — ALBUTEROL SULFATE 90 UG/1
1-2 AEROSOL, METERED RESPIRATORY (INHALATION)
Status: CANCELLED
Start: 2023-01-17

## 2023-01-16 RX ORDER — ALBUTEROL SULFATE 0.83 MG/ML
2.5 SOLUTION RESPIRATORY (INHALATION)
Status: CANCELLED | OUTPATIENT
Start: 2023-01-17

## 2023-01-16 RX ORDER — HEPARIN SODIUM (PORCINE) LOCK FLUSH IV SOLN 100 UNIT/ML 100 UNIT/ML
5 SOLUTION INTRAVENOUS
Status: CANCELLED | OUTPATIENT
Start: 2023-01-16

## 2023-01-16 RX ORDER — METHYLPREDNISOLONE SODIUM SUCCINATE 125 MG/2ML
125 INJECTION, POWDER, LYOPHILIZED, FOR SOLUTION INTRAMUSCULAR; INTRAVENOUS
Status: CANCELLED
Start: 2023-01-17

## 2023-01-16 RX ORDER — HEPARIN SODIUM,PORCINE 10 UNIT/ML
5 VIAL (ML) INTRAVENOUS
Status: CANCELLED | OUTPATIENT
Start: 2023-01-17

## 2023-01-16 RX ADMIN — HEPARIN SODIUM (PORCINE) LOCK FLUSH IV SOLN 100 UNIT/ML 5 ML: 100 SOLUTION at 14:15

## 2023-01-16 ASSESSMENT — PAIN SCALES - GENERAL: PAINLEVEL: MILD PAIN (2)

## 2023-01-16 NOTE — PATIENT INSTRUCTIONS
Thank you for allowing me to be a part of your care today.    I would like to see you back in about 4 week. I will check labs prior. At that visit, we will review your survivorship care plan. Today, I am also going to place an order for your 3 month PET scan that wont be completed sooner than 04/23/2023. Following that, you will have labs and see Dr. Pruitt.     Lastly, I will place referral to our ENT department for their role in surveillance.     Your prescription for Tramadol has been sent to: [facility] in [place].      If you have any questions please call [phone]    Other instructions:      Senna-S two tabs, twice daily, Miralax tomorrow if no BM.

## 2023-01-16 NOTE — NURSING NOTE
"Oncology Rooming Note    January 16, 2023 2:01 PM   Melchor Sadler is a 62 year old male who presents for:    Chief Complaint   Patient presents with     Oncology Clinic Visit     Follow up malignant neoplasm of left tonsil     Initial Vitals: /60   Pulse 79   Temp 98.1  F (36.7  C) (Tympanic)   Resp 20   Ht 1.753 m (5' 9\")   Wt 96.2 kg (212 lb 1.3 oz)   SpO2 97%   BMI 31.32 kg/m   Estimated body mass index is 31.32 kg/m  as calculated from the following:    Height as of this encounter: 1.753 m (5' 9\").    Weight as of this encounter: 96.2 kg (212 lb 1.3 oz). Body surface area is 2.16 meters squared.  Mild Pain (2) Comment: Data Unavailable   No LMP for male patient.  Allergies reviewed: Yes  Medications reviewed: Yes    Medications: Medication refills not needed today.  Pharmacy name entered into [employer]: [facility] - [address] URBAN Hernandez LPN            "

## 2023-01-17 ENCOUNTER — APPOINTMENT (OUTPATIENT)
Dept: RADIATION ONCOLOGY | Facility: HOSPITAL | Age: 63
End: 2023-01-17
Payer: COMMERCIAL

## 2023-01-17 ENCOUNTER — INFUSION THERAPY VISIT (OUTPATIENT)
Dept: INFUSION THERAPY | Facility: OTHER | Age: 63
End: 2023-01-17
Attending: NURSE PRACTITIONER
Payer: COMMERCIAL

## 2023-01-17 ENCOUNTER — RESULTS ONLY (OUTPATIENT)
Dept: RADIATION ONCOLOGY | Facility: HOSPITAL | Age: 63
End: 2023-01-17

## 2023-01-17 VITALS
TEMPERATURE: 98.3 F | DIASTOLIC BLOOD PRESSURE: 65 MMHG | SYSTOLIC BLOOD PRESSURE: 108 MMHG | HEART RATE: 71 BPM | OXYGEN SATURATION: 97 % | RESPIRATION RATE: 18 BRPM

## 2023-01-17 DIAGNOSIS — Z51.11 ENCOUNTER FOR ANTINEOPLASTIC CHEMOTHERAPY: ICD-10-CM

## 2023-01-17 DIAGNOSIS — C09.9 MALIGNANT NEOPLASM OF TONSIL (H): Primary | ICD-10-CM

## 2023-01-17 LAB
RAD ONC ARIA COURSE ID: NORMAL
RAD ONC ARIA COURSE LAST TREATMENT DATE: NORMAL
RAD ONC ARIA COURSE START DATE: NORMAL
RAD ONC ARIA COURSE TREATMENT ELAPSED DAYS: 49
RAD ONC ARIA FIRST TREATMENT DATE: NORMAL
RAD ONC ARIA PLAN FRACTIONS TREATED TO DATE: 31
RAD ONC ARIA PLAN ID: NORMAL
RAD ONC ARIA PLAN PRESCRIBED DOSE PER FRACTION: 2 GY
RAD ONC ARIA PLAN TOTAL FRACTIONS PRESCRIBED: 35
RAD ONC ARIA PLAN TOTAL PRESCRIBED DOSE: 7000 CGY
RAD ONC ARIA REFERENCE POINT DOSAGE GIVEN TO DATE: NORMAL GY
RAD ONC ARIA REFERENCE POINT DOSAGE GIVEN TO DATE: NORMAL GY
RAD ONC ARIA REFERENCE POINT ID: NORMAL
RAD ONC ARIA REFERENCE POINT ID: NORMAL

## 2023-01-17 PROCEDURE — 96375 TX/PRO/DX INJ NEW DRUG ADDON: CPT | Performed by: INTERNAL MEDICINE

## 2023-01-17 PROCEDURE — 77386 HC IMRT TREATMENT DELIVERY, COMPLEX: CPT | Performed by: RADIOLOGY

## 2023-01-17 PROCEDURE — 96413 CHEMO IV INFUSION 1 HR: CPT | Performed by: INTERNAL MEDICINE

## 2023-01-17 PROCEDURE — 77014 PR CT GUIDE FOR PLACEMENT RADIATION THERAPY FIELDS: CPT | Mod: 26 | Performed by: RADIOLOGY

## 2023-01-17 PROCEDURE — 96367 TX/PROPH/DG ADDL SEQ IV INF: CPT | Performed by: INTERNAL MEDICINE

## 2023-01-17 PROCEDURE — 77014 HC CT GUIDE FOR PLACEMENT RADIATION THERAPY FIELDS: CPT | Performed by: RADIOLOGY

## 2023-01-17 RX ORDER — PALONOSETRON 0.05 MG/ML
0.25 INJECTION, SOLUTION INTRAVENOUS ONCE
Status: COMPLETED | OUTPATIENT
Start: 2023-01-17 | End: 2023-01-17

## 2023-01-17 RX ORDER — HEPARIN SODIUM (PORCINE) LOCK FLUSH IV SOLN 100 UNIT/ML 100 UNIT/ML
5 SOLUTION INTRAVENOUS
Status: DISCONTINUED | OUTPATIENT
Start: 2023-01-17 | End: 2023-01-17 | Stop reason: HOSPADM

## 2023-01-17 RX ADMIN — HEPARIN SODIUM (PORCINE) LOCK FLUSH IV SOLN 100 UNIT/ML 5 ML: 100 SOLUTION at 10:59

## 2023-01-17 RX ADMIN — Medication 1000 ML: at 08:47

## 2023-01-17 RX ADMIN — PALONOSETRON 0.25 MG: 0.05 INJECTION, SOLUTION INTRAVENOUS at 08:48

## 2023-01-17 NOTE — PROGRESS NOTES
Patient is a 62 year old here accompanied by s/o today for infusion of cisplatin under the orders of Renetta Mendez. Patient denies changes to health hx, home medications, or allergies since provider visit 1/16/2023. Right sided power port accessed with 19 gauge 3/4 inch 90 degree bent non coring needle.  Line flushed with 10 cc's normal saline.  Needle secured with sterile transparent dressing. Patient tolerated well.  Denies pain and discomfort at this time.  Port flushes easily without resistance.    Hand hygiene performed: yes   Mask donned by caregiver: yes Site prepped with CHG: yes Labs drawn: yes Dressing applied using aseptic technique: yes   Component      Latest Ref Rng & Units 1/16/2023   WBC      4.0 - 11.0 10e3/uL 3.9 (L)   RBC Count      4.40 - 5.90 10e6/uL 3.79 (L)   Hemoglobin      13.3 - 17.7 g/dL 12.2 (L)   Hematocrit      40.0 - 53.0 % 33.9 (L)   MCV      78 - 100 fL 89   MCH      26.5 - 33.0 pg 32.2   MCHC      31.5 - 36.5 g/dL 36.0   RDW      10.0 - 15.0 % 14.6   Platelet Count      150 - 450 10e3/uL 119 (L)   % Neutrophils      % 77   % Lymphocytes      % 14   % Monocytes      % 9   % Eosinophils      % 0   % Basophils      % 0   % Immature Granulocytes      % 0   NRBCs per 100 WBC      <1 /100 0   Absolute Neutrophils      1.6 - 8.3 10e3/uL 3.0   Absolute Lymphocytes      0.8 - 5.3 10e3/uL 0.5 (L)   Absolute Monocytes      0.0 - 1.3 10e3/uL 0.3   Absolute Eosinophils      0.0 - 0.7 10e3/uL 0.0   Absolute Basophils      0.0 - 0.2 10e3/uL 0.0   Absolute Immature Granulocytes      <=0.4 10e3/uL 0.0   Absolute NRBCs      10e3/uL 0.0   Sodium      136 - 145 mmol/L 135 (L)   Potassium      3.4 - 5.3 mmol/L 3.7   Chloride      98 - 107 mmol/L 99   Carbon Dioxide (CO2)      22 - 29 mmol/L 27   Anion Gap      7 - 15 mmol/L 9   Urea Nitrogen      8.0 - 23.0 mg/dL 21.1   Creatinine      0.67 - 1.17 mg/dL 0.74   Calcium      8.8 - 10.2 mg/dL 9.3   Glucose      70 - 99 mg/dL 122 (H)   Alkaline  Phosphatase      40 - 129 U/L 89   AST      10 - 50 U/L 30   ALT      10 - 50 U/L 34   Protein Total      6.4 - 8.3 g/dL 6.4   Albumin      3.5 - 5.2 g/dL 4.0   Bilirubin Total      <=1.2 mg/dL 0.8   GFR Estimate      >60 mL/min/1.73m2 >90   Magnesium      1.7 - 2.3 mg/dL 1.8       Cisplatin dose(s) verified with Cecy JACKSON RN prior to release of drug.    Patient meets parameters for today's infusion.  Denies questions or concerns regarding today's infusion and/or medications being administered.      Patient identified with two identifiers, order verified, and verbal consent for today's infusion obtained from patient.    0948 IV pump verified with Cisplatin dose, drug, and rate of administration. Infusion administered per protocol. Patient tolerated infusion well, no signs or symptoms of adverse reaction noted. Patient denies pain nor discomfort.     Needle removed, tip intact. Site clean, dry and intact. Covered with a sterile bandage, slight pressure applied for 30 seconds. Pt instructed to leave bandage intact for a minimum of one hour, and to call with questions or concerns. Copy of appointments, discharge instructions, and after visit summary (AVS) provided to patient. Patient states understanding, discharged ambulatory.

## 2023-01-17 NOTE — PATIENT INSTRUCTIONS

## 2023-01-18 ENCOUNTER — OFFICE VISIT (OUTPATIENT)
Dept: RADIATION ONCOLOGY | Facility: HOSPITAL | Age: 63
End: 2023-01-18
Payer: COMMERCIAL

## 2023-01-18 ENCOUNTER — RESULTS ONLY (OUTPATIENT)
Dept: RADIATION ONCOLOGY | Facility: HOSPITAL | Age: 63
End: 2023-01-18

## 2023-01-18 VITALS
DIASTOLIC BLOOD PRESSURE: 56 MMHG | HEART RATE: 65 BPM | TEMPERATURE: 98 F | SYSTOLIC BLOOD PRESSURE: 122 MMHG | WEIGHT: 213.3 LBS | BODY MASS INDEX: 31.5 KG/M2 | OXYGEN SATURATION: 97 % | RESPIRATION RATE: 19 BRPM

## 2023-01-18 DIAGNOSIS — C09.9 MALIGNANT NEOPLASM OF TONSIL (H): Primary | ICD-10-CM

## 2023-01-18 LAB
RAD ONC ARIA COURSE ID: NORMAL
RAD ONC ARIA COURSE LAST TREATMENT DATE: NORMAL
RAD ONC ARIA COURSE START DATE: NORMAL
RAD ONC ARIA COURSE TREATMENT ELAPSED DAYS: 50
RAD ONC ARIA FIRST TREATMENT DATE: NORMAL
RAD ONC ARIA PLAN FRACTIONS TREATED TO DATE: 32
RAD ONC ARIA PLAN ID: NORMAL
RAD ONC ARIA PLAN PRESCRIBED DOSE PER FRACTION: 2 GY
RAD ONC ARIA PLAN TOTAL FRACTIONS PRESCRIBED: 35
RAD ONC ARIA PLAN TOTAL PRESCRIBED DOSE: 7000 CGY
RAD ONC ARIA REFERENCE POINT DOSAGE GIVEN TO DATE: NORMAL GY
RAD ONC ARIA REFERENCE POINT DOSAGE GIVEN TO DATE: NORMAL GY
RAD ONC ARIA REFERENCE POINT ID: NORMAL
RAD ONC ARIA REFERENCE POINT ID: NORMAL

## 2023-01-18 ASSESSMENT — PAIN SCALES - GENERAL: PAINLEVEL: MILD PAIN (2)

## 2023-01-18 NOTE — PROGRESS NOTES
Progress Notes  Encounter Date: Jan 18, 2023  Jeramie Figueroa MD     RADIATION ONCOLOGY WEEKLY MANAGEMENT PROGRESS NOTE     Patient Care Team       Relationship Specialty Notifications Start End    No Ref-Primary, Physician PCP - General   11/14/22     Fax: [phone]         Maryam Cannon MD Assigned PCP   9/24/22     Phone: [phone] Fax: [phone]         [address] [place] [address]    Vicki Alaniz MD Assigned Cancer Care Provider   11/26/22     Phone: [phone] Fax: [phone]         [address] [place] [address]    Isabelle Alamo, RN Specialty Care Coordinator Hematology & Oncology Admissions 12/13/22     Ángela Contreras RN Specialty Care Coordinator Hematology & Oncology All results, Admissions 12/13/22     Phone: [phone] Fax: [phone]         [facility] [address] [place] [address]            DIAGNOSIS:  Cancer Staging   Malignant neoplasm of tonsil (H)  Staging form: Pharynx - Oropharynx, AJCC 8th Edition  - Clinical stage from 11/16/2022: Stage I (cT1, cN1, cM0, p16+) - Signed by Vicki Alaniz MD on 11/16/2022    RADIATION THERAPY:    Melchor Sadler has received 6400 c Gy to date.   Treatment 32 of 35 fractions  Total planned dose 7000 cGy      SUBJECTIVE:  Using baking soda/salt rinse, magic mouth wash, and Aquaphor. States cough is the same as last week. Continues to have thick mucous and he believes this is what is causing him to cough. Reports fatigue is worse this week. Has been doing more liquids than solid foods due to difficulty with swallowing and sores in mouth.  Continues to drink protein shakes.      OBJECTIVE:  Oral cavity reddened but no breakdown. Moderate erythema left neck.  Weight down to his usual weight.  WEIGHT: 218.80lbs.  Examination reveals moderate erythema of neck, dry desquamation present to left side of neck.  Tonsil flat.  Small oral lesions/ sores present, oral mucositis present. Lymph node about 1-1.5 cm  and soft.      IMPRESSION:   Patient complains of severe pain when swallowing and he is no longer able to eat.  He is using the recommended skin and oral cavity care.      PLAN:   Reduce treatment by 1 fraction, total dose 66 Gy. Push fluids. Increase skin care, apply skin cream to QID, continue Aquaphor.        Medical record and imaging reviewed.    Bety Orosco MD

## 2023-01-19 ENCOUNTER — APPOINTMENT (OUTPATIENT)
Dept: RADIATION ONCOLOGY | Facility: HOSPITAL | Age: 63
End: 2023-01-19
Payer: COMMERCIAL

## 2023-01-19 ENCOUNTER — INFUSION THERAPY VISIT (OUTPATIENT)
Dept: INFUSION THERAPY | Facility: OTHER | Age: 63
End: 2023-01-19
Attending: INTERNAL MEDICINE
Payer: COMMERCIAL

## 2023-01-19 ENCOUNTER — RESULTS ONLY (OUTPATIENT)
Dept: RADIATION ONCOLOGY | Facility: HOSPITAL | Age: 63
End: 2023-01-19

## 2023-01-19 VITALS
SYSTOLIC BLOOD PRESSURE: 104 MMHG | HEART RATE: 65 BPM | OXYGEN SATURATION: 97 % | TEMPERATURE: 97.1 F | DIASTOLIC BLOOD PRESSURE: 60 MMHG

## 2023-01-19 DIAGNOSIS — C09.9 MALIGNANT NEOPLASM OF TONSIL (H): Primary | ICD-10-CM

## 2023-01-19 DIAGNOSIS — Z51.11 ENCOUNTER FOR ANTINEOPLASTIC CHEMOTHERAPY: ICD-10-CM

## 2023-01-19 LAB
RAD ONC ARIA COURSE ID: NORMAL
RAD ONC ARIA COURSE LAST TREATMENT DATE: NORMAL
RAD ONC ARIA COURSE START DATE: NORMAL
RAD ONC ARIA COURSE TREATMENT ELAPSED DAYS: 51
RAD ONC ARIA FIRST TREATMENT DATE: NORMAL
RAD ONC ARIA PLAN FRACTIONS TREATED TO DATE: 33
RAD ONC ARIA PLAN ID: NORMAL
RAD ONC ARIA PLAN PRESCRIBED DOSE PER FRACTION: 2 GY
RAD ONC ARIA PLAN TOTAL FRACTIONS PRESCRIBED: 35
RAD ONC ARIA PLAN TOTAL PRESCRIBED DOSE: 7000 CGY
RAD ONC ARIA REFERENCE POINT DOSAGE GIVEN TO DATE: NORMAL GY
RAD ONC ARIA REFERENCE POINT DOSAGE GIVEN TO DATE: NORMAL GY
RAD ONC ARIA REFERENCE POINT ID: NORMAL
RAD ONC ARIA REFERENCE POINT ID: NORMAL

## 2023-01-19 PROCEDURE — 77014 PR CT GUIDE FOR PLACEMENT RADIATION THERAPY FIELDS: CPT | Mod: 26 | Performed by: RADIOLOGY

## 2023-01-19 PROCEDURE — 77014 HC CT GUIDE FOR PLACEMENT RADIATION THERAPY FIELDS: CPT | Performed by: RADIOLOGY

## 2023-01-19 PROCEDURE — 96360 HYDRATION IV INFUSION INIT: CPT | Performed by: INTERNAL MEDICINE

## 2023-01-19 PROCEDURE — 77386 HC IMRT TREATMENT DELIVERY, COMPLEX: CPT | Performed by: RADIOLOGY

## 2023-01-19 RX ORDER — HEPARIN SODIUM (PORCINE) LOCK FLUSH IV SOLN 100 UNIT/ML 100 UNIT/ML
5 SOLUTION INTRAVENOUS
Status: DISCONTINUED | OUTPATIENT
Start: 2023-01-19 | End: 2023-01-19 | Stop reason: HOSPADM

## 2023-01-19 RX ORDER — HEPARIN SODIUM (PORCINE) LOCK FLUSH IV SOLN 100 UNIT/ML 100 UNIT/ML
5 SOLUTION INTRAVENOUS
Status: CANCELLED | OUTPATIENT
Start: 2023-01-19

## 2023-01-19 RX ADMIN — Medication 1000 ML: at 14:04

## 2023-01-19 RX ADMIN — HEPARIN SODIUM (PORCINE) LOCK FLUSH IV SOLN 100 UNIT/ML 5 ML: 100 SOLUTION at 15:03

## 2023-01-19 NOTE — PROGRESS NOTES
Patient is a 62 year old here today for infusion of IVF per order of Dr Pruitt .  Patient identified with two identifiers, order verified, and verbal consent for today's infusion obtained from patient.       Patient meets order parameters for today's treatment.     Patient denies questions or concerns regarding infusion and/or medication(s) being administered.    Patients port accessed using non-coring, 20 gauge, 1 needle. Port accessed per facility protocol. Port flushed easily, blood return noted.  No signs and symptoms of infection or infiltration.      IV pump verified with IVF dose, drug, and rate of administration.  Infusion administered per protocol.

## 2023-01-20 ENCOUNTER — APPOINTMENT (OUTPATIENT)
Dept: RADIATION ONCOLOGY | Facility: HOSPITAL | Age: 63
End: 2023-01-20
Payer: COMMERCIAL

## 2023-01-20 ENCOUNTER — RESULTS ONLY (OUTPATIENT)
Dept: RADIATION ONCOLOGY | Facility: HOSPITAL | Age: 63
End: 2023-01-20

## 2023-01-20 ENCOUNTER — DOCUMENTATION ONLY (OUTPATIENT)
Dept: RADIATION ONCOLOGY | Facility: HOSPITAL | Age: 63
End: 2023-01-20

## 2023-01-20 DIAGNOSIS — C09.9 MALIGNANT NEOPLASM OF TONSIL (H): Primary | ICD-10-CM

## 2023-01-20 LAB
RAD ONC ARIA COURSE ID: NORMAL
RAD ONC ARIA COURSE LAST TREATMENT DATE: NORMAL
RAD ONC ARIA COURSE START DATE: NORMAL
RAD ONC ARIA COURSE TREATMENT ELAPSED DAYS: 52
RAD ONC ARIA FIRST TREATMENT DATE: NORMAL
RAD ONC ARIA PLAN FRACTIONS TREATED TO DATE: 34
RAD ONC ARIA PLAN ID: NORMAL
RAD ONC ARIA PLAN PRESCRIBED DOSE PER FRACTION: 2 GY
RAD ONC ARIA PLAN TOTAL FRACTIONS PRESCRIBED: 35
RAD ONC ARIA PLAN TOTAL PRESCRIBED DOSE: 7000 CGY
RAD ONC ARIA REFERENCE POINT DOSAGE GIVEN TO DATE: NORMAL GY
RAD ONC ARIA REFERENCE POINT DOSAGE GIVEN TO DATE: NORMAL GY
RAD ONC ARIA REFERENCE POINT ID: NORMAL
RAD ONC ARIA REFERENCE POINT ID: NORMAL

## 2023-01-20 PROCEDURE — 77427 RADIATION TX MANAGEMENT X5: CPT | Performed by: RADIOLOGY

## 2023-01-20 PROCEDURE — 77014 PR CT GUIDE FOR PLACEMENT RADIATION THERAPY FIELDS: CPT | Mod: 26 | Performed by: RADIOLOGY

## 2023-01-20 PROCEDURE — 77014 HC CT GUIDE FOR PLACEMENT RADIATION THERAPY FIELDS: CPT | Performed by: RADIOLOGY

## 2023-01-20 PROCEDURE — 77336 RADIATION PHYSICS CONSULT: CPT | Performed by: RADIOLOGY

## 2023-01-20 PROCEDURE — 77386 HC IMRT TREATMENT DELIVERY, COMPLEX: CPT | Performed by: RADIOLOGY

## 2023-01-20 NOTE — PROGRESS NOTES
Melchor Sadler  Gender: male  : 1960  Medical Record: [id number]  Primary Care Provider: Gerda Mckeon    REFERRING PHYSICIANS: Dr. Pruitt    DIAGNOSIS: Malignant neoplasm of tonsil (H)    TREATMENT INTENT: Curative  AREA TREATED: Left Tonsil  PRIMARY TREATMENT TECHNIQUE: VMAT  ENERGY: 6 x  TUMOR DOSE: 6800 cGy  NUMBER OF TREATMENTS: 34    BOOST AREA TREATED: N/A    TOTAL NUMBER OF TREATMENTS: 34  TOTAL DOSE: 6800 cGy  ELAPSED CALENDAR DAYS: 51  COMPLETION DATE:      I elected to complete the patients treatment one fraction short of the original plan due to the acute side effects in the pharynx and P16 positivity of the tumor. The final dose is in the recommended range by NCCN.       Bety Zaldivar DNP, APRN, FNP-C   Department of Radiation Oncology

## 2023-01-25 ENCOUNTER — TRANSFERRED RECORDS (OUTPATIENT)
Dept: HEALTH INFORMATION MANAGEMENT | Facility: OTHER | Age: 63
End: 2023-01-25

## 2023-01-26 PROBLEM — G47.9 SLEEP DISORDER: Status: ACTIVE | Noted: 2022-12-12

## 2023-01-27 NOTE — PROGRESS NOTES
Oncology Follow-up Visit:  January 27, 2023    Diagnosis:  Malignant neoplasm of tonsil (H)    History Of Present Illness:  Mr. 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  He completed radiation therapy on 1/20/2023.  He received 6800 cGy, over 34 fractions.  He developed brisk erythema to the bilateral neck, dry desquamation, difficulty eating/esophageal pain, and lost over 30 pounds during treatment.  Today he presents for routine follow-up, skin check, and overall wellbeing check.  Today he reports feeling slightly better, feels that he is gaining weight, states he is starting to eat more. Does explain some oral soreness, and continues to use Aquaphor on the neck region. Denies fevers or chills.      Review Of Systems:  /74 (BP Location: Left arm, Patient Position: Chair, Cuff Size: Adult Regular)   Pulse 65   Temp 97.6  F (36.4  C) (Tympanic)   Resp 19   Wt 92 kg (202 lb 14.4 oz)   SpO2 98%   BMI 29.96 kg/m      Past medical, social, surgical, and family histories reviewed.    Allergies:  Allergies as of 02/01/2023 - Reviewed 02/01/2023   Allergen Reaction Noted     Azithromycin Other (See Comments) 12/20/2022     Codeine Other (See Comments) 11/24/2014     Penicillins Anxiety 03/05/2015       Current Medications:  Current Outpatient Medications   Medication Sig Dispense Refill     magnesium oxide (MAG-OX) 400 MG tablet Take 400 mg by mouth daily       NONFORMULARY Vitamin C Po daily       NONFORMULARY Bone broth       UNKNOWN TO PATIENT Vitamin D, unsure of dose       zinc gluconate 50 MG tablet Take 50 mg by mouth daily       magic mouthwash (ENTER INGREDIENTS IN COMMENTS) suspension Swish, gargle, and spit one to two teaspoonfuls every six hours as needed. May be swallowed if esophageal involvement. 240 mL 3         Physical Exam:  /74 (BP Location: Left arm, Patient Position: Chair, Cuff Size: Adult Regular)   Pulse 65   Temp 97.6  F (36.4  C) (Tympanic)   Resp 19   Wt 92 kg (202 lb 14.4 oz)   SpO2 98%   BMI 29.96 kg/m      GENERAL APPEARANCE: healthy, alert and no distress     HENT: mouth without ulcers or lesions and oral mucous membranes moist, oral cavity slightly erythematous, no lesions.      RESP: lungs clear to auscultation - no rales, rhonchi or wheezes     CARDIOVASCULAR: regular rates and rhythm, normal S1 S2, no S3 or S4 and no murmur.     ABDOMEN:  soft, nontender, and bowel sounds normal     MUSCULOSKELETAL: extremities normal. No edema b/l LE.     SKIN: no suspicious lesions or rashes     PSYCHIATRIC: mentation appears normal and affect normal    Laboratory/Imaging Studies  Results Only on 01/20/2023   Component Date Value Ref Range Status     Course ID 01/20/2023 C1   Final     Course Start Date 01/20/2023 11/7/2022   Final     Course First Treatment Date 01/20/2023 11/29/2022   Final     Course Last Treatment Date 01/20/2023 1/20/2023   Final     Course Elapsed Days 01/20/2023 52   Final     Reference Point ID 01/20/2023 Iso Tonsil   Final     Reference Point Dosage Given to Da* 01/20/2023   Gy Final                    Value:[phone]  [id number]       Reference Point ID 01/20/2023 RX PTV Tonsil lt   Final     Reference Point Dosage Given to Da* 01/20/2023   Gy Final                    Value:68  2       Plan ID 01/20/2023 Lt Tonsil   Final     Plan Fractions Treated to Date 01/20/2023 34   Final     Plan Total Fractions Prescribed 01/20/2023 35   Final     Plan Prescribed Dose Per Fraction * 01/20/2023 2  Gy Final     Plan Total Prescribed Dose (cGy) 01/20/2023 7,000  cGy Final   Results Only on 01/19/2023   Component Date Value Ref Range Status     Course ID 01/19/2023 C1   Final     Course Start Date 01/19/2023 11/7/2022   Final     Course First Treatment Date 01/19/2023 11/29/2022    Final     Course Last Treatment Date 01/19/2023 1/19/2023   Final     Course Elapsed Days 01/19/2023 51   Final     Reference Point ID 01/19/2023 Iso Tonsil   Final     Reference Point Dosage Given to Da* 01/19/2023   Gy Final                    Value:[id number]  [id number]       Reference Point ID 01/19/2023 RX PTV Tonsil lt   Final     Reference Point Dosage Given to Da* 01/19/2023   Gy Final                    Value:66  2       Plan ID 01/19/2023 Lt Tonsil   Final     Plan Fractions Treated to Date 01/19/2023 33   Final     Plan Total Fractions Prescribed 01/19/2023 35   Final     Plan Prescribed Dose Per Fraction * 01/19/2023 2  Gy Final     Plan Total Prescribed Dose (cGy) 01/19/2023 7,000  cGy Final        ASSESSMENT/PLAN:    Patient here for routine 2 week follow up/ skin check post XBRT. Bilateral neck region healing well without s/s of infection, slight dry desquamation nearly resolved- continue applying Aquaphor to assist with skin healing.  Weight trending down, encouraged patient to meet with nutrition and speech therapy, patient declined. Discussed eating softer foods, pushing protein drinks.  Persistent fatigue, again encouraged to continue to push foods/ fluids, also is a side effect from chemo/rads and should slowly resolve as patient continues to heal post RT. Patient will continue to follow with medical oncology and ENT, no further follow up with radiation oncology. Patient reports understanding, denies further questions or concerns at this time.     Bety Zaldivar DNP, APRN, FNP-C   Department of Radiation Oncology

## 2023-01-29 NOTE — PROGRESS NOTES
Otolaryngology Consultation    Patient: Melchor Sadler  : 1960    Patient presents with:  Consult: Malignant Neoplasm of Tonsil; Referred by Renetta Mendez CNP      HPI:  Melchor Sadler is a 62 year old male seen today for Evaluation of a [id number]  HPV positive left tonsil squamous cell carcinoma.      Completed concurrent chemoradiation on 23      He saw Dr. Houston on 10/4/2022 and elected to proceed with chemoradiotherapy.  2021 noted a neck mass  2022.  His primary and a CT was ordered  2022 CT soft tissue mass showed a left tonsillar pillar mass and a 3.8 cm left jugulodigastric node  2022 left tonsil biopsy showed poorly differentiated squamous cell carcinoma p16 positive.  Pathology reviewed under media, HPV+  Panendoscopy by Dr. Houston was negative  22  PET showed a 4 x 3.5 cm FDG avid left anterior node.  No evidence of distant metastatic disease  2022 started chemotherapy/Cisplatin with concurrent radiation therapy.  Completed radiation on 23    Is followed locally in oncology with Dr. Pruitt    He most recently saw Renetta Mendez, [facility], oncology on 23  He has had expected side effects of of mucositis and weight loss    In the interim he completed chemotherapy and radiation therapy, completed 23    3-month PET pending    He has oral mucositis, constipation, dysphagia  Tolerating a soft and liquid diet  196 #    He denies pharyngitis or otalgia      CT dated 2022 personally reviewed there is a large left jugulodigastric node measuring 2.9 x 3.8 cm  There is a left tonsillar mass that appears to extend to the anterior pillar and soft palate measuring 1.7 cm   the tongue base appears grossly symmetric  the vallecula piriforms are symmetric and clear      Chewed tobacco x 14 years, quit     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      Current Outpatient Rx   Medication Sig  Dispense Refill     magic mouthwash (ENTER INGREDIENTS IN COMMENTS) suspension Swish, gargle, and spit one to two teaspoonfuls every six hours as needed. May be swallowed if esophageal involvement. 240 mL 3     magnesium oxide (MAG-OX) 400 MG tablet Take 400 mg by mouth daily       NONFORMULARY Vitamin C Po daily       NONFORMULARY Bone broth       nystatin (MYCOSTATIN) 375518 UNIT/ML suspension Take 5 mLs (500,000 Units) by mouth 4 times daily (Patient not taking: Reported on 1/10/2023) 400 mL 0     ondansetron (ZOFRAN) 8 MG tablet Take 1 tablet (8 mg) by mouth every 8 hours as needed for nausea (vomiting) (Patient not taking: Reported on 12/27/2022) 30 tablet 2     prochlorperazine (COMPAZINE) 10 MG tablet Take 1 tablet (10 mg) by mouth every 6 hours as needed for nausea or vomiting (Patient not taking: Reported on 1/16/2023) 30 tablet 2     traMADol (ULTRAM) 50 MG tablet Take 1 tablet (50 mg) by mouth every 6 hours as needed for severe pain (7-10) 30 tablet 0     UNKNOWN TO PATIENT Vitamin D, unsure of dose       zinc gluconate 50 MG tablet Take 50 mg by mouth daily         Allergies: Azithromycin, Codeine, and Penicillins     Past Medical History:   Diagnosis Date     Malignant neoplasm of tonsil (H) 10/04/2022    Per [facility] Chart     Oropharyngeal cancer (H) 09/13/2022    Tonsil Cancer     GLENDA (obstructive sleep apnea) 09/22/2022    Per [facility] chart       Past Surgical History:   Procedure Laterality Date     INSERT PORT VASCULAR ACCESS N/A 11/11/2022    Procedure: Port-a-cath placement;  Surgeon: Teodoro Avila MD;  Location: [facility] OR     [facility] left [facility]       ENT family history reviewed    Social History     Tobacco Use     Smoking status: Never     Smokeless tobacco: Former     Types: Chew     Quit date: 1995   Vaping Use     Vaping Use: Never used   Substance Use Topics     Alcohol use: Not Currently     Drug use: No     Comment: Drug use: No  "      Review of Systems  ROS: 10 point ROS neg other than the symptoms noted above in the HPI and constipation tinnitus cold intolerance    Physical Exam  /58 (BP Location: Left arm, Cuff Size: Adult Regular)   Pulse 79   Temp 97.6  F (36.4  C) (Tympanic)   Ht 1.753 m (5' 9\")   Wt 88.9 kg (196 lb)   SpO2 97%   BMI 28.94 kg/m    General - The patient is well nourished and well developed, and appears to have good nutritional status.  Alert and oriented to person and place, answers questions and cooperates with examination appropriately.   Head and Face - Normocephalic and atraumatic, with no gross asymmetry noted.  The facial nerve is intact, with strong symmetric movements.  Voice and Breathing - The patient was breathing comfortably without the use of accessory muscles. There was no wheezing, stridor, or stertor.  The patients voice was clear and strong, and had appropriate pitch and quality.  No mic peripheral digital clubbing or cyanosis   Ears -The external auditory canals are patent, the tympanic membranes are intact without effusion, retraction or mass.  Bony landmarks are intact.  Eyes - Extraocular movements intact, and the pupils were reactive to light.  Sclera were not icteric or injected, conjunctiva were pink and moist.  Mouth - Examination of the oral cavity showed pink, healthy oral mucosa. No lesions or ulcerations noted.  The tongue was mobile and midline, and the dentition were in good condition.    Throat - left oropharynx and anterior pillar with radiation changes and grade 2 tonsil tissue.  Left tonsil is irregular but not exophytic or ulcerative.   Right grade 2 tonsil uvula midline.  No soft palate mass no posterior oropharyngeal wall mass.  Tongue base is symmetric to palpation  Neck - firm 2 cm left level II node. no contralateral or lower neck palpable enlarged fixed cervical lymph nodes.  No neck cysts or unusual tenderness to palpation.   No palpable fixed thyroid nodules or " concerning goiter.  The trachea is grossly midline.   Nose - External contour is symmetric, no gross deflection or scars.  Nasal mucosa is pink and moist with no abnormal mucus.  The septum and turbinates were evaluated.  No polyps, masses, or purulence noted on examination.    Attempts at mirror laryngoscopy were not possible due to gag reflex.  Therefore I proceeded with a fiberoptic examination after informed consent.  First I applied topical nasal lidocaine and neosynephrine.  I then passed the scope through the nasal cavity.     The nasopharynx was mucosally covered and symmetric.  The eustachian tube openings were unobstructed.  Going further down I had a clear view of the base of tongue which had normal appearing grade 2 lingual tonsillar tissue with radiation changes.  The base of tongue was free of lesions, and the vallecula was open.  The epiglottis was smooth and mucosally covered.  The supraglottic larynx was then clearly visualized.  The vocal cords moved smoothly and symmetrically and were without mass or nodules.  Vocal cord mobility was normal bilaterally with phonation and respiration..  The pyriform sinuses were open and without mic mass or pooling of secretions upon valsalva, and the limited view of the postcricoid region did not show any lesions.  The patient tolerated the procedure well.      Impression and Plan- Melchor Sadler is a 62 year old male with:    ICD-10-CM    1. History of radiation therapy  Z92.3       2. Malignant neoplasm of tonsil (H)  C09.9 Adult ENT  Referral      3. Status post chemotherapy  Z92.21       4. Personal history of chewing tobacco use, 14 year hx, quit 1995  Z87.891             Favorable early clinical response to chemoradiation.  I told Melchor and Sandra that generally I would not evaluate him until 12 weeks post completed radiation    stage I (T1N1M0) HPV + SCC  I have contacted Renetta and Dr. Pruitt regarding staging.  With p16+, should be stage 1  disease    Completed concurrent chemoradiation on 1/20/23      Follow up with ENT 12 weeks after last radiation treatment.  He is welcome to follow with Dr. Houston or Dylan.  This was discussed with Chiki    He would continue to follow-up with ENT over a 5-year period    Use OTC Biotene for dry/sore mouth    Continue the soda/salt rinses      Soda/Salt Oral Rinse    1/4 tsp. Baking Soda  1/8 tsp. Salt  1 cup Warm Water    Mix well until salt dissolves. Rinse your mouth gently, being careful not to swallow. After, rinse with plain water.       Yumiko Valles D.O.  Otolaryngology/Head and Neck Surgery  Allergy

## 2023-01-30 ENCOUNTER — OFFICE VISIT (OUTPATIENT)
Dept: OTOLARYNGOLOGY | Facility: OTHER | Age: 63
End: 2023-01-30
Attending: NURSE PRACTITIONER
Payer: COMMERCIAL

## 2023-01-30 VITALS
HEART RATE: 79 BPM | BODY MASS INDEX: 29.03 KG/M2 | DIASTOLIC BLOOD PRESSURE: 58 MMHG | HEIGHT: 69 IN | TEMPERATURE: 97.6 F | OXYGEN SATURATION: 97 % | SYSTOLIC BLOOD PRESSURE: 130 MMHG | WEIGHT: 196 LBS

## 2023-01-30 DIAGNOSIS — Z92.21 STATUS POST CHEMOTHERAPY: ICD-10-CM

## 2023-01-30 DIAGNOSIS — Z92.3 HISTORY OF RADIATION THERAPY: Primary | ICD-10-CM

## 2023-01-30 DIAGNOSIS — Z87.891 PERSONAL HISTORY OF TOBACCO USE, PRESENTING HAZARDS TO HEALTH: ICD-10-CM

## 2023-01-30 DIAGNOSIS — C09.9 MALIGNANT NEOPLASM OF TONSIL (H): ICD-10-CM

## 2023-01-30 PROCEDURE — 99204 OFFICE O/P NEW MOD 45 MIN: CPT | Mod: 25 | Performed by: OTOLARYNGOLOGY

## 2023-01-30 PROCEDURE — 31575 DIAGNOSTIC LARYNGOSCOPY: CPT | Performed by: OTOLARYNGOLOGY

## 2023-01-30 ASSESSMENT — PAIN SCALES - GENERAL: PAINLEVEL: MILD PAIN (2)

## 2023-01-30 NOTE — LETTER
2023         RE: Melchor Sadler  [address]  [place] [address]        Dear Colleague,    Thank you for referring your patient, Melchor Sadler, to the [facility]. Please see a copy of my visit note below.      Otolaryngology Consultation    Patient: Melchor Sadler  : 1960    Patient presents with:  Consult: Malignant Neoplasm of Tonsil; Referred by Renetta Mendez CNP      HPI:  Melchor Sadler is a 62 year old male seen today for Evaluation of a [id number]  HPV positive left tonsil squamous cell carcinoma.      Completed concurrent chemoradiation on 23      He saw Dr. Houston on 10/4/2022 and elected to proceed with chemoradiotherapy.  2021 noted a neck mass  2022.  His primary and a CT was ordered  2022 CT soft tissue mass showed a left tonsillar pillar mass and a 3.8 cm left jugulodigastric node  2022 left tonsil biopsy showed poorly differentiated squamous cell carcinoma p16 positive.  Pathology reviewed under media, HPV+  Panendoscopy by Dr. Houston was negative  22  PET showed a 4 x 3.5 cm FDG avid left anterior node.  No evidence of distant metastatic disease  2022 started chemotherapy/Cisplatin with concurrent radiation therapy.  Completed radiation on 23    Is followed locally in oncology with Dr. Pruitt    He most recently saw Renetta Mendez NP, oncology on 23  He has had expected side effects of of mucositis and weight loss    In the interim he completed chemotherapy and radiation therapy, completed 23    3-month PET pending    He has oral mucositis, constipation, dysphagia  Tolerating a soft and liquid diet  196 #    He denies pharyngitis or otalgia      CT dated 2022 personally reviewed there is a large left jugulodigastric node measuring 2.9 x 3.8 cm  There is a left tonsillar mass that appears to extend to the anterior pillar and soft palate measuring 1.7 cm   the tongue base appears grossly  symmetric  the vallecula piriforms are symmetric and clear      Chewed tobacco x 14 years, quit 1995    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      Current Outpatient Rx   Medication Sig Dispense Refill     magic mouthwash (ENTER INGREDIENTS IN COMMENTS) suspension Swish, gargle, and spit one to two teaspoonfuls every six hours as needed. May be swallowed if esophageal involvement. 240 mL 3     magnesium oxide (MAG-OX) 400 MG tablet Take 400 mg by mouth daily       NONFORMULARY Vitamin C Po daily       NONFORMULARY Bone broth       nystatin (MYCOSTATIN) 278981 UNIT/ML suspension Take 5 mLs (500,000 Units) by mouth 4 times daily (Patient not taking: Reported on 1/10/2023) 400 mL 0     ondansetron (ZOFRAN) 8 MG tablet Take 1 tablet (8 mg) by mouth every 8 hours as needed for nausea (vomiting) (Patient not taking: Reported on 12/27/2022) 30 tablet 2     prochlorperazine (COMPAZINE) 10 MG tablet Take 1 tablet (10 mg) by mouth every 6 hours as needed for nausea or vomiting (Patient not taking: Reported on 1/16/2023) 30 tablet 2     traMADol (ULTRAM) 50 MG tablet Take 1 tablet (50 mg) by mouth every 6 hours as needed for severe pain (7-10) 30 tablet 0     UNKNOWN TO PATIENT Vitamin D, unsure of dose       zinc gluconate 50 MG tablet Take 50 mg by mouth daily         Allergies: Azithromycin, Codeine, and Penicillins     Past Medical History:   Diagnosis Date     Malignant [facility] of [facility] (H[facility]04[facility]2022    [facility]     Oropharyngeal cancer (H) 09/13/2022    Tonsil Cancer     GLENDA (obstructive sleep apnea) 09/22/2022    Per [facility] chart       Past Surgical History:   Procedure Laterality Date     INSERT PORT VASCULAR ACCESS N/A 11/11/2022    Procedure: Port-a-cath placement;  Surgeon: Teodoro Avila MD;  Location: [facility] OR     Panendoscopy, biopsy left palate  09/22/2022    [facility]-Dr. Jamey Houston       ENT family history reviewed    Social  "History     Tobacco Use     Smoking status: Never     Smokeless tobacco: Former     Types: Chew     Quit date: 1995   Vaping Use     Vaping Use: Never used   Substance Use Topics     Alcohol use: Not Currently     Drug use: No     Comment: Drug use: No       Review of Systems  ROS: 10 point ROS neg other than the symptoms noted above in the HPI and constipation tinnitus cold intolerance    Physical Exam  /58 (BP Location: Left arm, Cuff Size: Adult Regular)   Pulse 79   Temp 97.6  F (36.4  C) (Tympanic)   Ht 1.753 m (5' 9\")   Wt 88.9 kg (196 lb)   SpO2 97%   BMI 28.94 kg/m    General - The patient is well nourished and well developed, and appears to have good nutritional status.  Alert and oriented to person and place, answers questions and cooperates with examination appropriately.   Head and Face - Normocephalic and atraumatic, with no gross asymmetry noted.  The facial nerve is intact, with strong symmetric movements.  Voice and Breathing - The patient was breathing comfortably without the use of accessory muscles. There was no wheezing, stridor, or stertor.  The patients voice was clear and strong, and had appropriate pitch and quality.  No mic peripheral digital clubbing or cyanosis   Ears -The external auditory canals are patent, the tympanic membranes are intact without effusion, retraction or mass.  Bony landmarks are intact.  Eyes - Extraocular movements intact, and the pupils were reactive to light.  Sclera were not icteric or injected, conjunctiva were pink and moist.  Mouth - Examination of the oral cavity showed pink, healthy oral mucosa. No lesions or ulcerations noted.  The tongue was mobile and midline, and the dentition were in good condition.    Throat - left oropharynx and anterior pillar with radiation changes and grade 2 tonsil tissue.  Left tonsil is irregular but not exophytic or ulcerative.   Right grade 2 tonsil uvula midline.  No soft palate mass no posterior oropharyngeal " wall mass.  Tongue base is symmetric to palpation  Neck - firm 2 cm left level II node. no contralateral or lower neck palpable enlarged fixed cervical lymph nodes.  No neck cysts or unusual tenderness to palpation.   No palpable fixed thyroid nodules or concerning goiter.  The trachea is grossly midline.   Nose - External contour is symmetric, no gross deflection or scars.  Nasal mucosa is pink and moist with no abnormal mucus.  The septum and turbinates were evaluated.  No polyps, masses, or purulence noted on examination.    Attempts at mirror laryngoscopy were not possible due to gag reflex.  Therefore I proceeded with a fiberoptic examination after informed consent.  First I applied topical nasal lidocaine and neosynephrine.  I then passed the scope through the nasal cavity.     The nasopharynx was mucosally covered and symmetric.  The eustachian tube openings were unobstructed.  Going further down I had a clear view of the base of tongue which had normal appearing grade 2 lingual tonsillar tissue with radiation changes.  The base of tongue was free of lesions, and the vallecula was open.  The epiglottis was smooth and mucosally covered.  The supraglottic larynx was then clearly visualized.  The vocal cords moved smoothly and symmetrically and were without mass or nodules.  Vocal cord mobility was normal bilaterally with phonation and respiration..  The pyriform sinuses were open and without mic mass or pooling of secretions upon valsalva, and the limited view of the postcricoid region did not show any lesions.  The patient tolerated the procedure well.      Impression and Plan- eMlchor Sadler is a 62 year old male with:    ICD-10-CM    1. History of radiation therapy  Z92.3       2. Malignant neoplasm of tonsil (H)  C09.9 Adult ENT  Referral      3. Status post chemotherapy  Z92.21       4. Personal history of chewing tobacco use, 14 year hx, quit 1995  Z87.891             Favorable early clinical  response to chemoradiation.  I told Melchor and Sandra that generally I would not evaluate him until 12 weeks post completed radiation    stage I (T1N1M0) HPV + SCC  I have contacted Renetta and Dr. Pruitt regarding staging.  With p16+, should be stage 1 disease    Completed concurrent chemoradiation on 1/20/23      Follow up with ENT 12 weeks after last radiation treatment.  He is welcome to follow with Dr. [place]on or Dylan.  This was discussed with Chiki    He would continue to follow-up with ENT over a 5-year period    Use OTC Biotene for dry/sore mouth    Continue the soda/salt rinses      Soda/Salt Oral Rinse    1/4 tsp. Baking Soda  1/8 tsp. Salt  1 cup Warm Water    Mix well until salt dissolves. Rinse your mouth gently, being careful not to swallow. After, rinse with plain water.       Yumiko Valles D.O.  Otolaryngology/Head and Neck Surgery  Allergy            Again, thank you for allowing me to participate in the care of your patient.        Sincerely,        Yumkio Valles MD

## 2023-01-30 NOTE — PATIENT INSTRUCTIONS
Thank you for allowing Dr. Valles and our ENT team to participate in your care.  If your medications are too expensive, please give the nurse a call.  We can possibly change this medication.  If you have a scheduling or an appointment question please contact our Health Unit Coordinator at their direct line [phone].   ALL nursing questions or concerns can be directed to your ENT nurse at: [phone] - Claudia    Follow up with ENT 12 weeks after last radiation treatment    Use OTC Biotene for dry/sore mouth    Continue the soda/salt rinses      Soda/Salt Oral Rinse    1/4 tsp. Baking Soda  1/8 tsp. Salt  1 cup Warm Water    Mix well until salt dissolves. Rinse your mouth gently, being careful not to swallow. After, rinse with plain water.

## 2023-02-01 ENCOUNTER — ONCOLOGY VISIT (OUTPATIENT)
Dept: RADIATION ONCOLOGY | Facility: HOSPITAL | Age: 63
End: 2023-02-01
Payer: COMMERCIAL

## 2023-02-01 VITALS
OXYGEN SATURATION: 98 % | WEIGHT: 202.9 LBS | SYSTOLIC BLOOD PRESSURE: 104 MMHG | RESPIRATION RATE: 19 BRPM | HEART RATE: 65 BPM | DIASTOLIC BLOOD PRESSURE: 74 MMHG | BODY MASS INDEX: 29.96 KG/M2 | TEMPERATURE: 97.6 F

## 2023-02-01 DIAGNOSIS — C09.9 MALIGNANT NEOPLASM OF TONSIL (H): Primary | ICD-10-CM

## 2023-02-01 PROCEDURE — G0463 HOSPITAL OUTPT CLINIC VISIT: HCPCS

## 2023-02-01 ASSESSMENT — PAIN SCALES - GENERAL: PAINLEVEL: MILD PAIN (3)

## 2023-02-01 NOTE — PROGRESS NOTES
Oncology Treatment Followup    Reason for Visit:  Melchor is a 62 year old gentleman with a diagnosis of tonsillar cancer who presents today for follow-up and to review his SCP.     Nursing Note and documentation reviewed: Yes    HPI:    Patient notes that he continues to have some lingering side effects from his recent treatment. Compared to a month ago, doing better, but it sounds like he was hopeful to be doing better than he is. He continues to have ongoing mouth sores, although it has improved. Continues to use salt and soda rinse. Doesn't really use MM as it only lasts about 15 minutes. Takes occasional Tylenol which helps. Hasn't used Tramadol.     He otherwise denies signs of infection. No fever, chills, chest pain, cough, or SOB. No bleeding concerns. No nausea or vomiting. Appetite improving but certain foods continue to be difficult to eat. Swallowing seems to be okay but certain foods cause burning in his mouth. Bowels are back to baseline. No skin concerns, skin to left neck has healed well. Energy levels are still down. Trying to stay active but he does feel pretty tired.     Oncologic History:   05/2021 First noticed left neck mass  08/16/2022 Seen by PCP, with complaints of left growing neck mass. CT ordered.   08/23/2022 CT soft tissue neck mass: 3.8 cm probable everardo mass in the left jugulodigastric chain. Adjacent also enlarged 2.3 cm long axis node. Differential considerations favor  metastatic disease. A mucosal mass is questioned at the roof of the left tonsillar pillar.  09/13/2022 Evaluated by Dr. Houston at [facility] who recommended perry endoscopy with biopsy of the left tonsil  09/22/2022 Underwent Panendoscopy with path showing poorly differentiated squamous carcinoma, p16 positive.   10/04/2022 Seen again by Dr. Houston who recommended PET scan and referral to radiation oncology and medical oncology for discussion regarding concurrent chemoRT.   10/26/2022 Met in consultation with Dr. Restrepo  of [facility], who at the time, states his radiotherapy plan would be 70 Gy delivered in 35 fractions of 2 Gy each. Ipsilateral vs bilateral neck coverage is TBD by results of PET CT.   11/2/2022 PET scan IMPRESSION: Large left anterior cervical chain lymph node metastasis measuring 4.0 x 3.5 cm in dimension. There are a few mildly enlarged adjacent left anterior cervical chain lymph nodes with uptake not significantly greater than that of background uptake. No evidence of distant metastatic disease otherwise.  11/03/2022 Met in consultation with Dr. Pruitt of [facility] who agreed with the the plan of concurrent chemorads, utilizing Cisplatin 40 mg weekly.   11/11/2022 Planned port placement  11/16/2022 Planned Rad Onc simulation  11/28/2022 Commenced XRT treatments  11/29/2022 C1 Cisplatin  01/20/2023 Finished radiation therapy.   01/30/2023 Seen by Dr. Valles. Exam showed a favorable early response to therapy, although would like to see back at the usual 3 month douglas.     Treatment Goal: Curative    Current Treatment: None    Previous treatment: Weekly Cisplatin 40 mg/m2 given concurrently with radiation therapy 6800 cGy given in 34 treatments    Past Medical History:   Diagnosis Date     Malignant neoplasm of tonsil (H) 10/04/2022    Per [facility] Chart     Oropharyngeal cancer (H) 09/13/2022    Tonsil Cancer     GLENDA (obstructive sleep apnea) 09/22/2022    Per [facility] chart       Past Surgical History:   Procedure Laterality Date     INSERT PORT VASCULAR ACCESS N/A 11/11/2022    Procedure: Port-a-cath placement;  Surgeon: Teodoro Avila MD;  Location: [facility] OR     Panendoscopy, biopsy left palate  09/22/2022    [facility]-Dr. Jamey Houston       Family History   Problem Relation Age of Onset     Lung Cancer Mother      Cancer Mother      Diabetes Father      Colon Cancer Maternal Grandmother      Hyperlipidemia No family hx of      Coronary Artery Disease No family hx of      Hypertension No family hx of      Breast  Cancer No family hx of      Prostate Cancer No family hx of      Depression No family hx of      Anxiety Disorder No family hx of      Mental Illness No family hx of      Anesthesia Reaction No family hx of      Asthma No family hx of        Social History     Socioeconomic History     Marital status:      Spouse name: Sandra     Number of children: 2     Years of education: 16     Highest education level: Not on file   Occupational History     Employer: [employer]     Occupation: ASV   Tobacco Use     Smoking status: Never     Smokeless tobacco: Former     Types: Chew     Quit date: 1995   Vaping Use     Vaping Use: Never used   Substance and Sexual Activity     Alcohol use: Not Currently     Drug use: No     Comment: Drug use: No     Sexual activity: Yes     Partners: Female   Other Topics Concern     Parent/sibling w/ CABG, MI or angioplasty before 65F 55M? Not Asked   Social History Narrative     Not on file     Social Determinants of Health     Financial Resource Strain: Not on file   Food Insecurity: Not on file   Transportation Needs: Not on file   Physical Activity: Not on file   Stress: Not on file   Social Connections: Not on file   Intimate Partner Violence: Not on file   Housing Stability: Not on file       Current Outpatient Medications   Medication     magnesium oxide (MAG-OX) 400 MG tablet     NONFORMULARY     Selenium 200 MCG TABS tablet     UNKNOWN TO PATIENT     zinc gluconate 50 MG tablet     magic mouthwash (ENTER INGREDIENTS IN COMMENTS) suspension     NONFORMULARY     No current facility-administered medications for this visit.     Facility-Administered Medications Ordered in Other Visits   Medication     heparin 100 UNIT/ML injection 5 mL     sodium chloride (PF) 0.9% PF flush 10-20 mL        Allergies   Allergen Reactions     Azithromycin Other (See Comments)     Panic attacks     Codeine Other (See Comments)     Anxiety     Penicillins Anxiety     Pt states his sister and father are  "allergic, but he doesn't believe he is. 11/11/2022       Review Of Systems:  A complete review of systems is negative except for the above mentioned items in the interval history.     Physical Exam:  /62   Pulse 57   Temp 98.1  F (36.7  C) (Tympanic)   Resp 18   Ht 1.753 m (5' 9\")   Wt 92.3 kg (203 lb 7.8 oz)   SpO2 97%   BMI 30.05 kg/m    GENERAL APPEARANCE: Healthy, alert and in no acute distress.  HEENT: Eyes appear normal without scleral icterus. Extraocular movements intact.  NECK:   Supple with normal range of motion. Skin of the radiation field healing well without redness or skin breakdown. Still a palpable approx 1x2 cm firm node.  RESP: Lungs clear to auscultation bilaterally, respirations regular and easy.  CARDIOVASCULAR: Regular rate and rhythm. Normal S1, S2; no murmur, gallop, or rub.  ABDOMEN: Soft, non-tender, non-distended. No palpable organomegaly or masses.  MUSCULOSKELETAL: Extremities without gross deformities noted.  SKIN: Ongoing healing to the left neck radiation field. No erythema or skin breakdown.   NEURO: Alert and oriented x 3.  Gait steady.  PSYCHIATRIC: Mentation and affect appear normal.  Mood appropriate.    Laboratory:  Results for orders placed or performed in visit on 02/13/23   Comprehensive metabolic panel     Status: Abnormal   Result Value Ref Range    Sodium 140 136 - 145 mmol/L    Potassium 4.2 3.4 - 5.3 mmol/L    Chloride 105 98 - 107 mmol/L    Carbon Dioxide (CO2) 27 22 - 29 mmol/L    Anion Gap 8 7 - 15 mmol/L    Urea Nitrogen 15.8 8.0 - 23.0 mg/dL    Creatinine 0.65 (L) 0.67 - 1.17 mg/dL    Calcium 8.9 8.8 - 10.2 mg/dL    Glucose 76 70 - 99 mg/dL    Alkaline Phosphatase 70 40 - 129 U/L    AST 19 10 - 50 U/L    ALT 17 10 - 50 U/L    Protein Total 6.3 (L) 6.4 - 8.3 g/dL    Albumin 3.6 3.5 - 5.2 g/dL    Bilirubin Total 0.3 <=1.2 mg/dL    GFR Estimate >90 >60 mL/min/1.73m2   Magnesium     Status: Normal   Result Value Ref Range    Magnesium 2.0 1.7 - 2.3 mg/dL "   CBC with platelets and differential     Status: Abnormal   Result Value Ref Range    WBC Count 4.5 4.0 - 11.0 10e3/uL    RBC Count 3.48 (L) 4.40 - 5.90 10e6/uL    Hemoglobin 11.7 (L) 13.3 - 17.7 g/dL    Hematocrit 33.8 (L) 40.0 - 53.0 %    MCV 97 78 - 100 fL    MCH 33.6 (H) 26.5 - 33.0 pg    MCHC 34.6 31.5 - 36.5 g/dL    RDW 18.1 (H) 10.0 - 15.0 %    Platelet Count 221 150 - 450 10e3/uL    % Neutrophils 51 %    % Lymphocytes 35 %    % Monocytes 13 %    % Eosinophils 0 %    % Basophils 0 %    % Immature Granulocytes 1 %    NRBCs per 100 WBC 0 <1 /100    Absolute Neutrophils 2.3 1.6 - 8.3 10e3/uL    Absolute Lymphocytes 1.6 0.8 - 5.3 10e3/uL    Absolute Monocytes 0.6 0.0 - 1.3 10e3/uL    Absolute Eosinophils 0.0 0.0 - 0.7 10e3/uL    Absolute Basophils 0.0 0.0 - 0.2 10e3/uL    Absolute Immature Granulocytes 0.0 <=0.4 10e3/uL    Absolute NRBCs 0.0 10e3/uL   Ferritin     Status: Abnormal   Result Value Ref Range    Ferritin 711 (H) 31 - 409 ng/mL   Iron and iron binding capacity     Status: Normal   Result Value Ref Range    Iron 76 61 - 157 ug/dL    Iron Binding Capacity 243 240 - 430 ug/dL    Iron Sat Index 31 15 - 46 %   TSH with free T4 reflex     Status: Normal   Result Value Ref Range    TSH 2.61 0.30 - 4.20 uIU/mL   CBC with platelets and differential     Status: Abnormal    Narrative    The following orders were created for panel order CBC with platelets and differential.  Procedure                               Abnormality         Status                     ---------                               -----------         ------                     CBC with platelets and d...[[id number]]  Abnormal            Final result                 Please view results for these tests on the individual orders.       Imaging Studies:    None this visit    ASSESSMENT/PLAN:    1. 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 Patient finished concurrent chemorads 01/20/2023. He did see   Hai early and a scope showed a favorable early clinical response to treatment, although this was done just about 10 days after treatment. Patient continues to have some ongoing side effects but has made some improvements. He is scheduled for his 3 month post tx PET. I will have him follow-up with Dr. Pruitt afterwards. He is also seeing Dr. Houston in ENT later this week. Will likely follow with him. If symptoms do not continue to improve, patient knows to reach out to us sooner.     2. Mucositis Continue salt/soda rinses and well as PRN magic mouthwash. Tylenol PRN to help with pain relief to help him eat. I have talked to the radiation staff. These symptoms should continue to get better. I have asked my staff to reach out to Pedrito in about 2 weeks and see how he is doing. I anticipate continue improvement, but if things worsen, he will reach out.     3. Anemia Ongoing. Will check iron and B12 today. Unable to add folate to today's labs. If levels are normal, I anticipate that this ongoing anemia is related to chemo but it should improve. Will recheck all these labs when patient returns to see Dr. Pruitt.     4. Fatigue Will check iron labs and TSH. Could be multifactorial. Related to chemo vs XRT vs anemia? Anticipate that this will continue to improve the further he gets out from treatment. If this worsens, he knows to reach out.     I encouraged patient to call with any questions or concerns.    85 minutes spent in the patient's encounter today with time spent in review of the chart along with in chart preparation.  Time was spent in review of the treamtent plan.  Time was also spent obtaining a review of systems and performing a physical exam.  Time was spent in review of all planned medications for treatment with the patient and questions answered.     [facility]  Medical Oncology

## 2023-02-13 ENCOUNTER — LAB (OUTPATIENT)
Dept: ONCOLOGY | Facility: OTHER | Age: 63
End: 2023-02-13
Attending: NURSE PRACTITIONER
Payer: COMMERCIAL

## 2023-02-13 VITALS
RESPIRATION RATE: 18 BRPM | WEIGHT: 203.48 LBS | DIASTOLIC BLOOD PRESSURE: 62 MMHG | SYSTOLIC BLOOD PRESSURE: 118 MMHG | HEIGHT: 69 IN | BODY MASS INDEX: 30.14 KG/M2 | TEMPERATURE: 98.1 F | HEART RATE: 57 BPM | OXYGEN SATURATION: 97 %

## 2023-02-13 DIAGNOSIS — R53.83 FATIGUE, UNSPECIFIED TYPE: ICD-10-CM

## 2023-02-13 DIAGNOSIS — D63.0 ANEMIA IN NEOPLASTIC DISEASE: ICD-10-CM

## 2023-02-13 DIAGNOSIS — C09.9 MALIGNANT NEOPLASM OF TONSIL (H): Primary | ICD-10-CM

## 2023-02-13 DIAGNOSIS — Z92.3 HISTORY OF RADIATION TO HEAD AND NECK REGION: ICD-10-CM

## 2023-02-13 DIAGNOSIS — Z51.11 ENCOUNTER FOR ANTINEOPLASTIC CHEMOTHERAPY: ICD-10-CM

## 2023-02-13 DIAGNOSIS — K12.30 MUCOSITIS: ICD-10-CM

## 2023-02-13 LAB
ALBUMIN SERPL BCG-MCNC: 3.6 G/DL (ref 3.5–5.2)
ALP SERPL-CCNC: 70 U/L (ref 40–129)
ALT SERPL W P-5'-P-CCNC: 17 U/L (ref 10–50)
ANION GAP SERPL CALCULATED.3IONS-SCNC: 8 MMOL/L (ref 7–15)
AST SERPL W P-5'-P-CCNC: 19 U/L (ref 10–50)
BASOPHILS # BLD AUTO: 0 10E3/UL (ref 0–0.2)
BASOPHILS NFR BLD AUTO: 0 %
BILIRUB SERPL-MCNC: 0.3 MG/DL
BUN SERPL-MCNC: 15.8 MG/DL (ref 8–23)
CALCIUM SERPL-MCNC: 8.9 MG/DL (ref 8.8–10.2)
CHLORIDE SERPL-SCNC: 105 MMOL/L (ref 98–107)
CREAT SERPL-MCNC: 0.65 MG/DL (ref 0.67–1.17)
DEPRECATED HCO3 PLAS-SCNC: 27 MMOL/L (ref 22–29)
EOSINOPHIL # BLD AUTO: 0 10E3/UL (ref 0–0.7)
EOSINOPHIL NFR BLD AUTO: 0 %
ERYTHROCYTE [DISTWIDTH] IN BLOOD BY AUTOMATED COUNT: 18.1 % (ref 10–15)
FERRITIN SERPL-MCNC: 711 NG/ML (ref 31–409)
GFR SERPL CREATININE-BSD FRML MDRD: >90 ML/MIN/1.73M2
GLUCOSE SERPL-MCNC: 76 MG/DL (ref 70–99)
HCT VFR BLD AUTO: 33.8 % (ref 40–53)
HGB BLD-MCNC: 11.7 G/DL (ref 13.3–17.7)
IMM GRANULOCYTES # BLD: 0 10E3/UL
IMM GRANULOCYTES NFR BLD: 1 %
IRON BINDING CAPACITY (ROCHE): 243 UG/DL (ref 240–430)
IRON SATN MFR SERPL: 31 % (ref 15–46)
IRON SERPL-MCNC: 76 UG/DL (ref 61–157)
LYMPHOCYTES # BLD AUTO: 1.6 10E3/UL (ref 0.8–5.3)
LYMPHOCYTES NFR BLD AUTO: 35 %
MAGNESIUM SERPL-MCNC: 2 MG/DL (ref 1.7–2.3)
MCH RBC QN AUTO: 33.6 PG (ref 26.5–33)
MCHC RBC AUTO-ENTMCNC: 34.6 G/DL (ref 31.5–36.5)
MCV RBC AUTO: 97 FL (ref 78–100)
MONOCYTES # BLD AUTO: 0.6 10E3/UL (ref 0–1.3)
MONOCYTES NFR BLD AUTO: 13 %
NEUTROPHILS # BLD AUTO: 2.3 10E3/UL (ref 1.6–8.3)
NEUTROPHILS NFR BLD AUTO: 51 %
NRBC # BLD AUTO: 0 10E3/UL
NRBC BLD AUTO-RTO: 0 /100
PLATELET # BLD AUTO: 221 10E3/UL (ref 150–450)
POTASSIUM SERPL-SCNC: 4.2 MMOL/L (ref 3.4–5.3)
PROT SERPL-MCNC: 6.3 G/DL (ref 6.4–8.3)
RBC # BLD AUTO: 3.48 10E6/UL (ref 4.4–5.9)
SODIUM SERPL-SCNC: 140 MMOL/L (ref 136–145)
TSH SERPL DL<=0.005 MIU/L-ACNC: 2.61 UIU/ML (ref 0.3–4.2)
WBC # BLD AUTO: 4.5 10E3/UL (ref 4–11)

## 2023-02-13 PROCEDURE — 36591 DRAW BLOOD OFF VENOUS DEVICE: CPT | Performed by: INTERNAL MEDICINE

## 2023-02-13 PROCEDURE — 80050 GENERAL HEALTH PANEL: CPT | Performed by: INTERNAL MEDICINE

## 2023-02-13 PROCEDURE — 82728 ASSAY OF FERRITIN: CPT | Performed by: INTERNAL MEDICINE

## 2023-02-13 PROCEDURE — 99215 OFFICE O/P EST HI 40 MIN: CPT | Performed by: NURSE PRACTITIONER

## 2023-02-13 PROCEDURE — 83540 ASSAY OF IRON: CPT | Performed by: INTERNAL MEDICINE

## 2023-02-13 PROCEDURE — 83550 IRON BINDING TEST: CPT | Performed by: INTERNAL MEDICINE

## 2023-02-13 PROCEDURE — 83735 ASSAY OF MAGNESIUM: CPT | Performed by: INTERNAL MEDICINE

## 2023-02-13 PROCEDURE — 82607 VITAMIN B-12: CPT | Performed by: INTERNAL MEDICINE

## 2023-02-13 PROCEDURE — 99417 PROLNG OP E/M EACH 15 MIN: CPT | Performed by: NURSE PRACTITIONER

## 2023-02-13 RX ORDER — HEPARIN SODIUM (PORCINE) LOCK FLUSH IV SOLN 100 UNIT/ML 100 UNIT/ML
5 SOLUTION INTRAVENOUS
Status: DISCONTINUED | OUTPATIENT
Start: 2023-02-13 | End: 2023-02-13 | Stop reason: HOSPADM

## 2023-02-13 RX ORDER — HEPARIN SODIUM (PORCINE) LOCK FLUSH IV SOLN 100 UNIT/ML 100 UNIT/ML
5 SOLUTION INTRAVENOUS
Status: CANCELLED | OUTPATIENT
Start: 2023-02-13

## 2023-02-13 RX ADMIN — HEPARIN SODIUM (PORCINE) LOCK FLUSH IV SOLN 100 UNIT/ML 5 ML: 100 SOLUTION at 13:56

## 2023-02-13 ASSESSMENT — PAIN SCALES - GENERAL: PAINLEVEL: MODERATE PAIN (4)

## 2023-02-13 NOTE — NURSING NOTE
"Oncology Rooming Note    February 13, 2023 1:59 PM   Melchor Sadler is a 62 year old male who presents for:    Chief Complaint   Patient presents with     Oncology Clinic Visit     Survivorship. Malignant neoplasm of tonsil      Initial Vitals: /62   Pulse 57   Temp 98.1  F (36.7  C) (Tympanic)   Resp 18   Ht 1.753 m (5' 9\")   Wt 92.3 kg (203 lb 7.8 oz)   SpO2 97%   BMI 30.05 kg/m   Estimated body mass index is 30.05 kg/m  as calculated from the following:    Height as of this encounter: 1.753 m (5' 9\").    Weight as of this encounter: 92.3 kg (203 lb 7.8 oz). Body surface area is 2.12 meters squared.  Moderate Pain (4) Comment: Data Unavailable   No LMP for male patient.  Allergies reviewed: Yes  Medications reviewed: Yes    Medications: Medication refills not needed today.  Pharmacy name entered into [employer]: [facility] [address] [place] [address]    Clinical concerns: very tired yet, hoping to get more energy soon. States has that sore on his tongue still and has noticed blister type sores on the back of his mouth. Magic mouthwash doesn't seem to help much at all. Hard to eat certain foods, trying to gain weight. Renetta Mendez was notified.      Ángela Clark LPN              "

## 2023-02-13 NOTE — PROGRESS NOTES
Call to Renetta Mendez NP ONC re need for lab orders for today. TORB to use labs for May at today's visit and they will address any further labs after April's visit.

## 2023-02-13 NOTE — PATIENT INSTRUCTIONS
Thank you for allowing me to be a part of your care today.    I would like you to follow-up with Dr. Pruitt 04/27/2023, following your 3 month PET scan. You will have labs completed prior. Please follow-up sooner with concerns.     If you have any questions please call [phone]    Other instructions:      I will call with your B12 results are abnormal.

## 2023-02-14 ENCOUNTER — OFFICE VISIT (OUTPATIENT)
Dept: OTOLARYNGOLOGY | Facility: OTHER | Age: 63
End: 2023-02-14
Attending: OTOLARYNGOLOGY
Payer: COMMERCIAL

## 2023-02-14 DIAGNOSIS — C09.9 TONSIL CANCER (H): Primary | ICD-10-CM

## 2023-02-14 LAB — VIT B12 SERPL-MCNC: 579 PG/ML (ref 232–1245)

## 2023-02-14 PROCEDURE — G0463 HOSPITAL OUTPT CLINIC VISIT: HCPCS

## 2023-02-16 NOTE — PROGRESS NOTES
document embedded image  Patient Name: Melchor Sadler    Address: [address] [place] [address]     YOB: 1960    [place] [id number]    MR Number: [id number]    Phone: [phone]  PCP: Gerda Evans MD            Appointment Date: 02/14/23   Visit Provider: Jamey Houston MD    cc: Gerda Evans MD; ~    ENT Progress Note  Intake  Visit Reasons: CANCER F/U    HPI  History of Present Illness  Chief complaint:  Follow-up head neck cancer    History  The patient is a 62-year-old male who was diagnosed with a T1 N2 B left tonsil cancer in October of 2022.  He completed his chemotherapy and radiation therapy on January 20, 2023.  He is here today to begin his post tumor treatment surveillance.  He sounds like he had a fair amount of mucositis and local inflammation that is settling.  He is returning to normal diet.  He is starting to gain weight following completion of his therapy.    Exam  Oral cavity oropharynx-there is a little lingering mucositis at his tonsillar fossa on the left.  There is no obvious residual tumor.  Nasal-no obstruction or purulence  Neck-he has a 1-1/2 cm palpable nodule in the digastric region on the left.  I can not palpate any other cervical lymph nodes.  Head and neck integument-Clear  Indirect laryngoscopy-no visible hypopharyngeal or laryngeal lesions  Bimanual exam-not performed today  General-the patient appears well and in no distress  Neuro-there are no focal cranial nerve deficits appreciated    Allergies    codeine Adverse Reaction (Verified 09/22/22 12:35)  Anxiety  Penicillins Adverse Reaction (Verified 09/22/22 12:35)  Unknown    PFSH  PFSH:     Social History: (Reviewed 09/22/22 @ 14:45 by Jeramie Weaver MD)  Smoking Status:  Never smoker       A&P  Assessment & Plan  (1) Tonsil cancer:        Status: Acute        Code(s):  C09.9 - Malignant neoplasm of tonsil, unspecified  He does appear to have a persistent left cervical lymph node.  He does  not feel this has been enlarging.  We will follow this conservatively unless it shows growth or persistent activity on his three-month post PET, in which case we would return him to the operating room for a modified neck dissection on this side.  He will see me monthly in the coming year.               Jamey Houston MD    02/14/23 0770    <Electronically signed by Jamey Houston MD> 02/15/23 [id number]

## 2023-02-22 ENCOUNTER — THERAPY VISIT (OUTPATIENT)
Dept: SLEEP MEDICINE | Facility: HOSPITAL | Age: 63
End: 2023-02-22
Attending: FAMILY MEDICINE
Payer: COMMERCIAL

## 2023-02-22 PROCEDURE — 95811 POLYSOM 6/>YRS CPAP 4/> PARM: CPT | Mod: 26 | Performed by: FAMILY MEDICINE

## 2023-02-22 PROCEDURE — 95811 POLYSOM 6/>YRS CPAP 4/> PARM: CPT

## 2023-02-23 NOTE — PROGRESS NOTES
Patient is here with a history of GLENDA needing to establish care and have his CPAP up dated. Patient had respiratory events with an index of 63 and low SPO2 of 87. He was placed on CPAP and moved to a pressure of 12 cmH2O he looked good on this but also looked good on 6. Although his SPO2 remained low  Patient tolerated test and treatment well.

## 2023-02-23 NOTE — PROCEDURES
" SLEEP STUDY INTERPRETATION  SPLIT NIGHT STUDY      Patient: GRICEL BECERRA  YOB: 1960  Study Date: 2/22/2023  MRN: [id number]  Referring Provider: Gerda Mckeon MD  Ordering Provider: Rich Paez MD    Indications for Polysomnography: The patient is a 62 year old Male who is 5' 9\" and weighs 218.0 lbs. His BMI is 32.3, Daisy sleepiness scale 4 and neck circumference is - cm. Relevant medical history includes GLENDA, tonsillar cancer. A diagnostic polysomnogram was performed to document known GLENDA to allow for replacement equipment. After 134.0 minutes of sleep time the patient exhibited sufficient respiratory events qualifying him for a CPAP trial which was then initiated.    Polysomnogram Data: A full night polysomnogram recorded the standard physiologic parameters including EEG, EOG, EMG, ECG, nasal and oral airflow. Respiratory parameters of chest and abdominal movements were recorded with respiratory inductance plethysmography. Oxygen saturation was recorded by pulse oximetry.  Hypopnea scoring rule used: 1B 4%    Diagnostic PSG  Sleep Architecture: Delayed sleep latency, no clear N3 or REM observed.  Increased arousal index.  The total recording time of the polysomnogram was 247.8 minutes. The total sleep time was 134.0 minutes. Sleep latency was increased at 30.3 minutes without the use of a sleep aid. REM latency was - minutes. Arousal index was increased at 35.4 arousals per hour. Sleep efficiency was decreased at 54.1%. Wake after sleep onset was 83.5 minutes. The patient spent 19.4% of total sleep time in Stage N1, 80.6% in Stage N2, 0.0% in Stage N3, and 0.0% in REM. Time in REM supine was - minutes.    Respiration: Moderate GLENDA (AHI 28.7) without sleep-associated hypoxemia.  Potential that severity under-reported given no REM observed on diagnostic portion.    Events ? The polysomnogram revealed a presence of 62 obstructive, - central, and - mixed apneas resulting in an " apnea index of 27.8 events per hour. There were 2 obstructive hypopneas and - central hypopneas resulting in an obstructive hypopnea index of 0.9 and central hypopnea index of - events per hour. The combined apnea/hypopnea index was 28.7 events per hour (central apnea/hypopnea index was - events per hour).  The REM AHI was - events per hour. The supine AHI was 43.6 events per hour. The RERA index was - events per hour. The RDI was 28.7 events per hour.    Snoring - was reported as mild to moderate.    Respiratory rate and pattern - was notable for normal respiratory rate and pattern.    Sustained Sleep Associated Hypoventilation - Carbon dioxide monitoring was not used, however significant hypoventilation was not suggested by oximetry.    Sleep Associated Hypoxemia - (Greater than 5 minutes O2 sat at or below 88%) was not present. Baseline oxygen saturation was 94.9%. Lowest oxygen saturation was 87.3%. Time spent less than or equal to 88% was 0.2 minutes. Time spent less than or equal to 89% was 0.4 minutes.     Treatment PSG  Sleep Architecture: Consolidated appeared improved, but continued cortical arousals.  Supine REM observed.  At 01:28:38 AM the patient was placed on PAP treatment and was titrated at pressures ranging from CPAP 5 cmH2O up to CPAP 12 cmH2O. The total recording time of the treatment portion of the study was 227.1 minutes. The total sleep time was 176.0 minutes. During the treatment portion of the study the sleep latency was 16.0 minutes. REM latency was 35.0 minutes. Arousal index was increased at 41.6 arousals per hour. Sleep efficiency was improved at 77.5%. Wake after sleep onset was 35.0 minutes. The patient spent 7.7% of total sleep time in Stage N1, 65.6% in Stage N2, 8.0% in Stage N3, and 18.8% in REM. Time in REM supine was 21.5 minutes.     Respiration: CPAP at 6 cm H2O appeared effective in lateral REM / NREM.  On higher pressures to zenith of 12 cm H2O, seen to have persistent  obstructive and central apnea events, though majority appeared obstructive.  Central events did not have a Cheyne abdullahi respiration pattern.    The final pressure was CPAP 12 cmH2O with an AHI of 43.6 events per hour. Time in REM supine on final pressure was - minutes.     This titration was considered Adequate (residual AHI with 75% decrease or above constraints without REM?supine sleep at final pressure).    Movement Activity: Rare PLM s observed    Periodic Limb Movements  o During the diagnostic portion of the study, there were - PLMs recorded. The PLM index was - movements per hour. The PLM Arousal Index was - per hour.  o During the treatment portion of the study, there were 4 PLMs recorded. The PLM index was 1.4 movements per hour. The PLM Arousal Index was 1.4 per hour.    REM EMG Activity - Excessive transient/sustained muscle activity was not present.    Nocturnal Behavior - Abnormal sleep related behaviors were not noted during/arising out of NREM / REM sleep.     Bruxism - None apparent.    Cardiac Summary: Appears NSR  During the diagnostic portion of the study, the average pulse rate was 55.1 bpm. The minimum pulse rate was 47.0 bpm while the maximum pulse rate was 78.0 bpm.    During the treatment portion of the study, the average pulse rate was 54.9 bpm. The minimum pulse rate was 48.0 bpm while the maximum pulse rate was 105.0 bpm.     Assessment:     Delayed sleep latency, no clear N3 or REM observed.  Increased arousal index.    Moderate GLENDA (AHI 28.7) without sleep-associated hypoxemia.  Potential that severity under-reported given no REM observed on diagnostic portion.    CPAP at 6 cm H2O appeared effective in lateral REM / NREM.  On higher pressures to zenith of 12 cm H2O, seen to have persistent obstructive and central apnea events, though majority appeared obstructive.      Central events did not have a Cheyne abdullahi respiration pattern.    Recommendations:    Consider start of treatment  with CPAP 6 cm H2O with careful monitoring of PAP download for elevated AHI or other evidence of residual complex / central sleep apnea.    Consider all-night PAP titration PSG with potential indication for ASV with consideration for echocardiogram or review of prior echocardiogram testing.    Advice regarding the risks of drowsy driving.    Suggest optimizing sleep schedule and avoiding sleep deprivation.    Weight management (if BMI > 30).    Pharmacologic therapy should be used for management of restless legs syndrome only if present and clinically indicated and not based on the presence of periodic limb movements alone.    Diagnostic Codes:   Obstructive Sleep Apnea G47.33  Primary Central Sleep Apnea G47.31     _____________________________________   Electronically Signed By: Rich Paez MD (2/23/2023)
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
## 2023-03-02 ENCOUNTER — VIRTUAL VISIT (OUTPATIENT)
Dept: PULMONOLOGY | Facility: OTHER | Age: 63
End: 2023-03-02
Attending: FAMILY MEDICINE
Payer: COMMERCIAL

## 2023-03-02 VITALS — WEIGHT: 203 LBS | HEIGHT: 69 IN | BODY MASS INDEX: 30.07 KG/M2

## 2023-03-02 DIAGNOSIS — G47.33 OSA (OBSTRUCTIVE SLEEP APNEA): Primary | ICD-10-CM

## 2023-03-02 PROCEDURE — 99213 OFFICE O/P EST LOW 20 MIN: CPT | Mod: 95 | Performed by: FAMILY MEDICINE

## 2023-03-02 ASSESSMENT — SLEEP AND FATIGUE QUESTIONNAIRES
HOW LIKELY ARE YOU TO NOD OFF OR FALL ASLEEP IN A CAR, WHILE STOPPED FOR A FEW MINUTES IN TRAFFIC: WOULD NEVER DOZE
HOW LIKELY ARE YOU TO NOD OFF OR FALL ASLEEP WHILE WATCHING TV: HIGH CHANCE OF DOZING
HOW LIKELY ARE YOU TO NOD OFF OR FALL ASLEEP WHILE SITTING QUIETLY AFTER LUNCH WITHOUT ALCOHOL: WOULD NEVER DOZE
HOW LIKELY ARE YOU TO NOD OFF OR FALL ASLEEP WHILE SITTING AND TALKING TO SOMEONE: SLIGHT CHANCE OF DOZING
HOW LIKELY ARE YOU TO NOD OFF OR FALL ASLEEP WHILE SITTING INACTIVE IN A PUBLIC PLACE: SLIGHT CHANCE OF DOZING
HOW LIKELY ARE YOU TO NOD OFF OR FALL ASLEEP WHILE SITTING AND READING: HIGH CHANCE OF DOZING
HOW LIKELY ARE YOU TO NOD OFF OR FALL ASLEEP WHEN YOU ARE A PASSENGER IN A CAR FOR AN HOUR WITHOUT A BREAK: WOULD NEVER DOZE
HOW LIKELY ARE YOU TO NOD OFF OR FALL ASLEEP WHILE LYING DOWN TO REST IN THE AFTERNOON WHEN CIRCUMSTANCES PERMIT: HIGH CHANCE OF DOZING

## 2023-03-02 NOTE — NURSING NOTE
Is the patient currently in the state of [place]? YES    Visit mode:TELEPHONE    If the visit is dropped, the patient can be reconnected by: TELEPHONE VISIT: Phone number: [phone]    Will anyone else be joining the visit? NO    How would you like to obtain your AVS? MyChart    Are changes needed to the allergy or medication list? NO    Reason for visit:  Discuss PSG results.    No flu shot    Guillermina Laws, SOLEDAD/TARIK

## 2023-03-09 ENCOUNTER — TELEPHONE (OUTPATIENT)
Dept: HOME HEALTH SERVICES | Facility: CLINIC | Age: 63
End: 2023-03-09
Payer: COMMERCIAL

## 2023-03-14 ENCOUNTER — OFFICE VISIT (OUTPATIENT)
Dept: OTOLARYNGOLOGY | Facility: OTHER | Age: 63
End: 2023-03-14
Attending: OTOLARYNGOLOGY
Payer: COMMERCIAL

## 2023-03-14 DIAGNOSIS — C09.9 TONSIL CANCER (H): Primary | ICD-10-CM

## 2023-03-14 PROCEDURE — G0463 HOSPITAL OUTPT CLINIC VISIT: HCPCS

## 2023-03-14 NOTE — NURSING NOTE
Patient here to see ENT for one month cancer follow up.   Senia Blunt LPN ..........3/14/2023 2:43 PM

## 2023-03-16 NOTE — PROGRESS NOTES
document embedded image  Patient Name: Melchor Sadler    Address: [address] [place] [address]     YOB: 1960    [place] [id number]    MR Number: [id number]    Phone: [phone]  PCP: Gerda Evans MD            Appointment Date: 03/14/23   Visit Provider: Jamey Houston MD    cc: Gerda Evans MD; ~    ENT Progress Note  Intake  Visit Reasons: 1 mo f/u cancer    HPI  History of Present Illness  Chief complaint:  Follow-up head neck cancer    History  The patient is a 62-year-old male who was diagnosed with a T1 N2 B left tonsil cancer in October of 2022.  He completed radiation and chemotherapy in January of 2023.  Does have a PET scan arrange for late April.  Here today for routine follow-up.  He is feeling better.  He is returning to normal diet.  He is slowly gaining strength.    Exam  Oral cavity oropharynx-free of mucosal lesion  Bimanual exam-no palpable tongue base or tonsillar fossa lesions  Neck-distant firm nodule in the left jugulodigastric region.  It does not appear changed from his last visit a month ago.  Head and neck integument-Clear  General-the patient appears well and in no distress  Neuro-there are no focal cranial nerve deficits  Indirect laryngoscopy-Clear hypopharynx and larynx    Allergies    codeine Adverse Reaction (Verified 09/22/22 12:35)  Anxiety  Penicillins Adverse Reaction (Verified 09/22/22 12:35)  Unknown    PFSH  PFSH:     Social History: (Reviewed 09/22/22 @ 14:45 by Jeramie Weaver MD)  Smoking Status:  Never smoker       A&P  Assessment & Plan  (1) Tonsil cancer:        Status: Acute        Code(s):  C09.9 - Malignant neoplasm of tonsil, unspecified  The patient has a persisting left neck nodule.  We will wait and do his follow-up PET scan as long as this is not showing growth.  If there is any activity in this lesion at the time of his PET we will proceed to left neck dissection.  The patient was counseled in this regard.                Jamey Houston MD    03/14/23 0758    <Electronically signed by Jamey Houston MD> 03/15/23 112

## 2023-03-23 ENCOUNTER — DOCUMENTATION ONLY (OUTPATIENT)
Dept: HOME HEALTH SERVICES | Facility: CLINIC | Age: 63
End: 2023-03-23
Payer: COMMERCIAL

## 2023-03-23 DIAGNOSIS — G47.33 OSA (OBSTRUCTIVE SLEEP APNEA): Primary | ICD-10-CM

## 2023-03-23 NOTE — PROGRESS NOTES
Patient was offered choice of vendor and chose [facility].  Patient Melchor Sadler was set up at [facility] on March 23, 2023. Patient received a Resmed Airsense 11 Pressures were set at 6 cm H2O.   Patient s ramp is  Off and FLEX/EPR is EPR1.  Patient received a Resmed Mask name: Airfit P30i  Pillow mask size tbd, heated tubing and heated humidifier.  Patient has the following compliance requirements: none  Patient has a follow up on tbd with Dr. Paez.    Mark Doty

## 2023-03-24 ENCOUNTER — HOSPITAL ENCOUNTER (OUTPATIENT)
Dept: INFUSION THERAPY | Facility: OTHER | Age: 63
Discharge: HOME | End: 2023-03-24
Attending: FAMILY MEDICINE | Admitting: FAMILY MEDICINE
Payer: COMMERCIAL

## 2023-03-24 DIAGNOSIS — Z51.11 ENCOUNTER FOR ANTINEOPLASTIC CHEMOTHERAPY: Primary | ICD-10-CM

## 2023-03-24 DIAGNOSIS — C09.9 MALIGNANT NEOPLASM OF TONSIL (H): ICD-10-CM

## 2023-03-24 PROCEDURE — 250N000011 HC RX IP 250 OP 636: Performed by: INTERNAL MEDICINE

## 2023-03-24 PROCEDURE — 96523 IRRIG DRUG DELIVERY DEVICE: CPT

## 2023-03-24 RX ORDER — HEPARIN SODIUM (PORCINE) LOCK FLUSH IV SOLN 100 UNIT/ML 100 UNIT/ML
5 SOLUTION INTRAVENOUS
Status: CANCELLED | OUTPATIENT
Start: 2023-03-24

## 2023-03-24 RX ORDER — HEPARIN SODIUM (PORCINE) LOCK FLUSH IV SOLN 100 UNIT/ML 100 UNIT/ML
5 SOLUTION INTRAVENOUS
Status: DISCONTINUED | OUTPATIENT
Start: 2023-03-24 | End: 2023-03-25 | Stop reason: HOSPADM

## 2023-03-24 RX ADMIN — Medication 5 ML: at 15:06

## 2023-03-24 NOTE — NURSING NOTE
Patients port accessed using non-coring, 19 gauge, 3/4 inch needle. Port accessed per facility protocol.    Hand hygiene performed: yes   Mask donned by caregiver: yes   Site prepped with CHG: yes   Labs drawn: no   Dressing applied using aseptic technique: yes   Port flushed easily, without resistance. Flushed with 10 cc's normal saline.   Immediate blood return noted. Port flushed with 20 cc 0.9% normal saline and 5 cc heparinized saline. Needle removed intact, sterile dressing applied. Slight pressure applied for 30 seconds.   Patient tolerated port flush well, denies pain nor discomfort at this time. Patient instructed to leave dressing intact for a minimum of one hour, and to call with questions or concerns. Patient states understanding and is in agreement with this plan. Patient discharged ambulatory. Alisson Davila RN

## 2023-04-11 ENCOUNTER — OFFICE VISIT (OUTPATIENT)
Dept: OTOLARYNGOLOGY | Facility: OTHER | Age: 63
End: 2023-04-11
Attending: OTOLARYNGOLOGY
Payer: COMMERCIAL

## 2023-04-11 DIAGNOSIS — C09.9 TONSIL CANCER (H): Primary | ICD-10-CM

## 2023-04-11 PROCEDURE — G0463 HOSPITAL OUTPT CLINIC VISIT: HCPCS

## 2023-04-11 NOTE — PROGRESS NOTES
Patient is being seen today by Dr. Houston, ENT, for Cancer Follow Up .  Lucila Kimble LPN...................4/11/2023   2:09 PM

## 2023-04-13 NOTE — PROGRESS NOTES
document embedded image  Patient Name: Melchor Sadler    Address: [address] [place] [address]     YOB: 1960    [place] [id number]    MR Number: [id number]    Phone: [phone]  PCP: Gerda Evans MD            Appointment Date: 04/11/23   Visit Provider: Jamey Houston MD    cc: Gerda Evans MD; ~    ENT Progress Note  Intake  Visit Reasons: Cancer follow up    HPI  History of Present Illness  Chief complaint:  Follow-up head neck cancer    History  The patient is a 62-year-old man who was diagnosed with a T1 N2 B left tonsil cancer in October of 2022.  He was treated with combined chemotherapy and radiation therapy.  He has been following since completion of therapy in January.  He notable persisting nodule in his left neck that does not appear to be enlarging.  He has a PET scan scheduled for the end of this month.  He has no significant symptoms aside from some dryness of his mouth and blunting of his taste.  He is starting to gain weight.    Exam  Oral cavity oropharynx-free of lesions or inflammation  Nasal-no obstruction or purulence  Indirect laryngoscopy-Clear hypopharynx and larynx  Bimanual exam-no palpable tongue base or tonsillar fossa lesions noted at this time  Neck-he does have a 1.5 cm persistent nodule in his left mid neck that does not been enlarging.  I can feel no other adenopathy.   Head and neck integument-Clear  General-the patient appears well and in no distress  Neuro-there are no focal cranial nerve deficits    Allergies    codeine Adverse Reaction (Verified 09/22/22 12:35)  Anxiety  Penicillins Adverse Reaction (Verified 09/22/22 12:35)  Unknown    PFSH  PFSH:     Social History: (Reviewed 09/22/22 @ 14:45 by Jeramie Weaver MD)  Smoking Status:  Never smoker       A&P  Assessment & Plan  (1) Tonsil cancer:        Status: Acute        Code(s):  C09.9 - Malignant neoplasm of tonsil, unspecified  We will continue monthly follow-up visits.  We will  assess his left persistent lymph node on PET later this month and if there is any significant lingering activity, he will need a left modified neck dissection.  He is hoping to return to work in May.               Jamey Houston MD    04/11/23 0957    <Electronically signed by Jamey Houston MD> 04/12/23 8561

## 2023-04-26 ENCOUNTER — HOSPITAL ENCOUNTER (OUTPATIENT)
Dept: PET IMAGING | Facility: HOSPITAL | Age: 63
Discharge: HOME | End: 2023-04-26
Attending: NURSE PRACTITIONER | Admitting: NURSE PRACTITIONER
Payer: COMMERCIAL

## 2023-04-26 DIAGNOSIS — C09.9 MALIGNANT NEOPLASM OF TONSIL (H): ICD-10-CM

## 2023-04-26 PROCEDURE — 78815 PET IMAGE W/CT SKULL-THIGH: CPT | Mod: PS

## 2023-04-26 PROCEDURE — A9552 F18 FDG: HCPCS | Performed by: NURSE PRACTITIONER

## 2023-04-26 PROCEDURE — 343N000001 HC RX 343: Performed by: NURSE PRACTITIONER

## 2023-04-26 RX ADMIN — FLUDEOXYGLUCOSE F-18 13.59 MILLICURIE: 500 INJECTION, SOLUTION INTRAVENOUS at 07:32

## 2023-04-27 ENCOUNTER — LAB (OUTPATIENT)
Dept: ONCOLOGY | Facility: OTHER | Age: 63
End: 2023-04-27
Attending: INTERNAL MEDICINE
Payer: COMMERCIAL

## 2023-04-27 VITALS
WEIGHT: 203.26 LBS | BODY MASS INDEX: 30.11 KG/M2 | TEMPERATURE: 97 F | HEIGHT: 69 IN | HEART RATE: 59 BPM | SYSTOLIC BLOOD PRESSURE: 116 MMHG | DIASTOLIC BLOOD PRESSURE: 64 MMHG | OXYGEN SATURATION: 96 %

## 2023-04-27 DIAGNOSIS — C09.9 MALIGNANT NEOPLASM OF TONSIL (H): Primary | ICD-10-CM

## 2023-04-27 DIAGNOSIS — Z51.11 ENCOUNTER FOR ANTINEOPLASTIC CHEMOTHERAPY: ICD-10-CM

## 2023-04-27 DIAGNOSIS — R53.83 OTHER FATIGUE: ICD-10-CM

## 2023-04-27 LAB
ALBUMIN SERPL BCG-MCNC: 4.1 G/DL (ref 3.5–5.2)
ALP SERPL-CCNC: 68 U/L (ref 40–129)
ALT SERPL W P-5'-P-CCNC: 21 U/L (ref 10–50)
ANION GAP SERPL CALCULATED.3IONS-SCNC: 4 MMOL/L (ref 7–15)
AST SERPL W P-5'-P-CCNC: 20 U/L (ref 10–50)
BASOPHILS # BLD AUTO: 0 10E3/UL (ref 0–0.2)
BASOPHILS NFR BLD AUTO: 0 %
BILIRUB SERPL-MCNC: 0.3 MG/DL
BUN SERPL-MCNC: 18 MG/DL (ref 8–23)
CALCIUM SERPL-MCNC: 9.7 MG/DL (ref 8.8–10.2)
CHLORIDE SERPL-SCNC: 102 MMOL/L (ref 98–107)
CREAT SERPL-MCNC: 0.65 MG/DL (ref 0.67–1.17)
DEPRECATED HCO3 PLAS-SCNC: 30 MMOL/L (ref 22–29)
EOSINOPHIL # BLD AUTO: 0.1 10E3/UL (ref 0–0.7)
EOSINOPHIL NFR BLD AUTO: 1 %
ERYTHROCYTE [DISTWIDTH] IN BLOOD BY AUTOMATED COUNT: 11.3 % (ref 10–15)
GFR SERPL CREATININE-BSD FRML MDRD: >90 ML/MIN/1.73M2
GLUCOSE SERPL-MCNC: 95 MG/DL (ref 70–99)
HCT VFR BLD AUTO: 42.9 % (ref 40–53)
HGB BLD-MCNC: 15.4 G/DL (ref 13.3–17.7)
IMM GRANULOCYTES # BLD: 0 10E3/UL
IMM GRANULOCYTES NFR BLD: 0 %
LYMPHOCYTES # BLD AUTO: 1.4 10E3/UL (ref 0.8–5.3)
LYMPHOCYTES NFR BLD AUTO: 29 %
MAGNESIUM SERPL-MCNC: 1.9 MG/DL (ref 1.7–2.3)
MCH RBC QN AUTO: 32.8 PG (ref 26.5–33)
MCHC RBC AUTO-ENTMCNC: 35.9 G/DL (ref 31.5–36.5)
MCV RBC AUTO: 92 FL (ref 78–100)
MONOCYTES # BLD AUTO: 0.5 10E3/UL (ref 0–1.3)
MONOCYTES NFR BLD AUTO: 11 %
NEUTROPHILS # BLD AUTO: 2.8 10E3/UL (ref 1.6–8.3)
NEUTROPHILS NFR BLD AUTO: 59 %
NRBC # BLD AUTO: 0 10E3/UL
NRBC BLD AUTO-RTO: 0 /100
PLATELET # BLD AUTO: 168 10E3/UL (ref 150–450)
POTASSIUM SERPL-SCNC: 4.2 MMOL/L (ref 3.4–5.3)
PROT SERPL-MCNC: 6.8 G/DL (ref 6.4–8.3)
RBC # BLD AUTO: 4.69 10E6/UL (ref 4.4–5.9)
SODIUM SERPL-SCNC: 136 MMOL/L (ref 136–145)
TSH SERPL DL<=0.005 MIU/L-ACNC: 3.32 UIU/ML (ref 0.3–4.2)
WBC # BLD AUTO: 4.9 10E3/UL (ref 4–11)

## 2023-04-27 PROCEDURE — 80050 GENERAL HEALTH PANEL: CPT | Performed by: INTERNAL MEDICINE

## 2023-04-27 PROCEDURE — 99214 OFFICE O/P EST MOD 30 MIN: CPT | Performed by: INTERNAL MEDICINE

## 2023-04-27 PROCEDURE — 36591 DRAW BLOOD OFF VENOUS DEVICE: CPT | Performed by: INTERNAL MEDICINE

## 2023-04-27 PROCEDURE — 83735 ASSAY OF MAGNESIUM: CPT | Performed by: INTERNAL MEDICINE

## 2023-04-27 RX ORDER — HEPARIN SODIUM (PORCINE) LOCK FLUSH IV SOLN 100 UNIT/ML 100 UNIT/ML
5 SOLUTION INTRAVENOUS
Status: CANCELLED | OUTPATIENT
Start: 2023-04-27

## 2023-04-27 RX ORDER — HEPARIN SODIUM (PORCINE) LOCK FLUSH IV SOLN 100 UNIT/ML 100 UNIT/ML
5 SOLUTION INTRAVENOUS
Status: DISCONTINUED | OUTPATIENT
Start: 2023-04-27 | End: 2023-04-27 | Stop reason: HOSPADM

## 2023-04-27 RX ADMIN — HEPARIN SODIUM (PORCINE) LOCK FLUSH IV SOLN 100 UNIT/ML 5 ML: 100 SOLUTION at 14:00

## 2023-04-27 ASSESSMENT — PAIN SCALES - GENERAL: PAINLEVEL: NO PAIN (0)

## 2023-04-27 NOTE — PROGRESS NOTES
MEDICAL ONCOLOGY FOLLOW UP NOTE  Apr 27, 2023    Reason for follow up: Head and neck cancer.     HISTORY OF PRESENT ILLNESS  Melchor Sadler is a 62 year old male with PMH as stated below who is seen in the oncology clinic for consultation of his head and neck cancer.     His history in short is as follows:    Mr. Sadler initially felt a swelling in his neck 1 year ago. It was monitored. He then underwent a CT scan when he was found to have laceration of the scalp.    8/23/2022: CT soft tissue neck mass: 3.8 cm probable everardo mass in the left jugulodigastric chain. Adjacent also enlarged 2.3 cm long axis node. Differential considerations favor  metastatic disease. A mucosal mass is questioned at the roof of the left tonsillar pillar.    He was evaluated by Dr. 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     Recommended PET scan and referral to radiation oncology and medical oncology for discussion regarding concurrent chemoRT.     10/26/2022: Radiation oncology consultation with recommendations for concurrent chemoRT.     11/2/2022: PET scan: IMPRESSION: Large left anterior cervical chain lymph node metastasismeasuring 4.0 x 3.5 cm in dimension. There are a few mildly enlarged  adjacent left anterior cervical chain lymph nodes with uptake not significantly greater than that of background uptake. No evidence of distant metastatic disease otherwise.    11/29/2022 to 1/20/2023 : cisplatin 40 mg/m2 chemotherapy with concurrent RT.    4/26/2023: PET scan:Significant interval improvement. No evidence of abnormal FDG uptake that would suggest residual.4 cm mass seen previously in the left side of the neck has decreased significantly in size currently measuring 1.8 cm in diameter    Interim history     Mr. Sadler is doing well. He denies any mouth sores or pain although he does not feel he is back to  his baseline. Denies any fever or chills. Denies any nausea or vomiting. Denies any neuropathy. Slowly gaining back his lsot weight. No skin changes.     REVIEW OF SYSTEMS  A 12-point ROS negative except as in HPI      Current Outpatient Medications   Medication Sig Dispense Refill     magnesium oxide (MAG-OX) 400 MG tablet Take 400 mg by mouth daily       NONFORMULARY Vitamin C Po daily       Selenium 200 MCG TABS tablet Take 200 mcg by mouth daily       UNKNOWN TO PATIENT Vitamin D, unsure of dose       zinc gluconate 50 MG tablet Take 50 mg by mouth daily       NONFORMULARY Bone broth (Patient not taking: Reported on 2/13/2023)         Allergies   Allergen Reactions     Azithromycin Other (See Comments)     Panic attacks     Codeine Other (See Comments)     Anxiety     Penicillins Anxiety     Pt states his sister and father are allergic, but he doesn't believe he is. 11/11/2022     Immunization History   Administered Date(s) Administered     MMR 09/11/2009, 09/11/2009     TDAP (Adacel,Boostrix) 01/30/2013       Past Medical History:   Diagnosis Date     Malignant neoplasm of tonsil (H) 10/04/2022    Per [facility] Chart     Oropharyngeal cancer (H) 09/13/2022    Tonsil Cancer     GLENDA (obstructive sleep apnea) 09/22/2022    Per [facility] chart       Past Surgical History:   Procedure Laterality Date     INSERT PORT VASCULAR ACCESS N/A 11/11/2022    Procedure: Port-a-cath placement;  Surgeon: Teodoro Avila MD;  Location: [facility] OR     Panendoscopy, biopsy left palate  09/22/2022    [facility]-Dr. Jamey Houston       SOCIAL HISTORY  History   Smoking Status     Never   Smokeless Tobacco     Former     Types: Chew     Quit date: 1995    Social History    Substance and Sexual Activity      Alcohol use: Not Currently     History   Drug Use No     Comment: Drug use: No       FAMILY HISTORY  Family History   Problem Relation Age of Onset     Lung Cancer Mother      Cancer Mother      Diabetes Father      Colon Cancer  "Maternal Grandmother      Hyperlipidemia No family hx of      Coronary Artery Disease No family hx of      Hypertension No family hx of      Breast Cancer No family hx of      Prostate Cancer No family hx of      Depression No family hx of      Anxiety Disorder No family hx of      Mental Illness No family hx of      Anesthesia Reaction No family hx of      Asthma No family hx of        PHYSICAL EXAMINATION  /64 (BP Location: Right arm, Cuff Size: Adult Regular)   Pulse 59   Temp 97  F (36.1  C) (Tympanic)   Ht 1.753 m (5' 9\")   Wt 92.2 kg (203 lb 4.2 oz)   SpO2 96%   BMI 30.02 kg/m    Wt Readings from Last 2 Encounters:   04/27/23 92.2 kg (203 lb 4.2 oz)   03/02/23 92.1 kg (203 lb)     Physical Exam  HENT:      Mouth/Throat:      Mouth: Mucous membranes are moist.   Eyes:      Conjunctiva/sclera: Conjunctivae normal.   Neck:      Comments: Fullness left in left neck. No able to palpate discrete mass.   Pulmonary:      Effort: Pulmonary effort is normal.   Neurological:      General: No focal deficit present.      Mental Status: He is alert and oriented to person, place, and time.     LABORATORY AND IMAGING   Latest Reference Range & Units 04/27/23 13:24   WBC 4.0 - 11.0 10e3/uL 4.9   Hemoglobin 13.3 - 17.7 g/dL 15.4   Hematocrit 40.0 - 53.0 % 42.9   Platelet Count 150 - 450 10e3/uL 168       ASSESSMENT AND PLAN    1. 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     Referred by ENT for discussion regarding concurrent chemoRT. Has met with radiation oncology. PET scan did not show any distant disease.     Completed concurrent chemoRT. 11/2022 to 1/2023.     PET scan done in 3 months showed decrease in size of mass in left neck from 4 cm to 1.8 cm. Not aivd. He is seeing ENT, Dr. Houston in follow up with PET scan. Will wait for ENT to evaluate.     Follow up in one month in clinic.     2. Nighttime Hypoxia.     Has CPAP now.     Total time spent on the patient on day of " encounter was 30 minutes doing chart review, review of test results, interpretation of results, patient visit and documentation.      Ching Pruitt MD

## 2023-04-27 NOTE — PROGRESS NOTES
Patients right sided port accessed using non-coring, 19 gauge, 3/4 needle. Port accessed per facility protocol.  Hand hygiene performed: yes   Mask donned by caregiver: yes Site prepped with CHG: yes Labs drawn: yes Dressing applied using aseptic technique: yes Port flushed easily, without resistance. Flushed with 10 cc's normal saline.   Immediate blood return noted. 10 cc blood discarded.  2 vials blood draw and sent to lab for results.  Port flushed with 20 cc 0.9% normal saline and 5 cc heparinized saline.  Needle removed intact, sterile dressing applied.  Slight pressure applied for 30 seconds.   Patient tolerated port flush well, denies pain nor discomfort at this time. Patient instructed to leave dressing intact for a minimum of one hour, and to call with questions or concerns.  Patient states understanding and is in agreement with this plan. Patient discharged ambulatory. Isabelle Barnes RN

## 2023-04-27 NOTE — NURSING NOTE
"Oncology Rooming Note    April 27, 2023 1:19 PM   Melchor Sadler is a 62 year old male who presents for:    Chief Complaint   Patient presents with     Oncology Clinic Visit     Malignant neoplasm of left tonsil     Initial Vitals: /64 (BP Location: Right arm, Cuff Size: Adult Regular)   Pulse 59   Temp 97  F (36.1  C) (Tympanic)   Ht 1.753 m (5' 9\")   Wt 92.2 kg (203 lb 4.2 oz)   SpO2 96%   BMI 30.02 kg/m   Estimated body mass index is 30.02 kg/m  as calculated from the following:    Height as of this encounter: 1.753 m (5' 9\").    Weight as of this encounter: 92.2 kg (203 lb 4.2 oz). Body surface area is 2.12 meters squared.  No Pain (0) Comment: Data Unavailable   No LMP for male patient.  Allergies reviewed: Yes  Medications reviewed: Yes    Medications: Medication refills not needed today.  Pharmacy name entered into [employer]: [facility] - [address]    Clinical concerns: none Dr. Pruitt was NOT notified.      Margarita Alarcon LPN              "

## 2023-05-09 ENCOUNTER — OFFICE VISIT (OUTPATIENT)
Dept: OTOLARYNGOLOGY | Facility: OTHER | Age: 63
End: 2023-05-09
Attending: OTOLARYNGOLOGY
Payer: COMMERCIAL

## 2023-05-09 DIAGNOSIS — C09.9 TONSIL CANCER (H): Primary | ICD-10-CM

## 2023-05-09 PROCEDURE — G0463 HOSPITAL OUTPT CLINIC VISIT: HCPCS

## 2023-05-12 NOTE — PROGRESS NOTES
document embedded image  Patient Name: Melchor Sadler    Address: [address] [place] [address]     YOB: 1960    [place] [address]    MR Number: [id number]    Phone: [phone]  PCP: Gerda Evans MD            Appointment Date: 05/09/23   Visit Provider: Jamey Houston MD    cc: Gerda Evans MD; ~    ENT Progress Note  Intake  Visit Reasons: Cancer follow up    HPI  History of Present Illness  Chief complaint:  Follow-up head neck cancer    History  The patient is a 62-year-old man who was diagnosed with a T1 N2 B left tonsil cancer in October of 2022.  He was treated with chemotherapy and radiation therapy.  He is done quite well.  He is had a small residual left mid jugular neck mass that remains palpable.  He completed a recent PET scan that shows no activity at this site.  The remainder of the head neck is clear as well.  He has no new complaints today    Exam  Oral cavity oropharynx-free of lesions or inflammation  Neck-he does have the small palpable mid jugular mass on the left that remains.  I believe it is smaller than when last seen.  Nasal-no obstruction or purulence  Bimanual exam-no palpable tongue base or tonsillar fossa lesions  Indirect laryngoscopy reveals Clear hypopharynx and larynx  Head and neck integument-Clear  General-the patient appears well and in no distress  Neuro-there are no focal cranial nerve deficits    Allergies    codeine Adverse Reaction (Verified 09/22/22 12:35)  Anxiety  Penicillins Adverse Reaction (Verified 09/22/22 12:35)  Unknown    PFSH  PFSH:     Social History: (Reviewed 09/22/22 @ 14:45 by Jeramie Weaver MD)  Smoking Status:  Never smoker       A&P  Assessment & Plan  (1) Tonsil cancer:        Status: Acute        Code(s):  C09.9 - Malignant neoplasm of tonsil, unspecified  The patient was counseled that in the pre PET scan days when these chemo and radiation therapy protocols were being developed we would routinely perform neck  dissections on people with persisting palpable disease 3 months out from treatment.  Almost always, we would not find viable cancer in the specimens.  With his PET scan failing to reveal significant activity at this site I would advise continued observation.  I would repeat a PET scan in 6 months if there is lingering palpable mass.               Jamey Houston MD    Filed: 05/10/23 [id number]    <Electronically signed by Jamey Houston MD> 05/10/23 4125

## 2023-05-26 ENCOUNTER — ONCOLOGY VISIT (OUTPATIENT)
Dept: ONCOLOGY | Facility: OTHER | Age: 63
End: 2023-05-26
Attending: INTERNAL MEDICINE
Payer: COMMERCIAL

## 2023-05-26 ENCOUNTER — HOSPITAL ENCOUNTER (OUTPATIENT)
Dept: INFUSION THERAPY | Facility: OTHER | Age: 63
Discharge: HOME | End: 2023-05-26
Attending: INTERNAL MEDICINE
Payer: COMMERCIAL

## 2023-05-26 VITALS
WEIGHT: 208 LBS | BODY MASS INDEX: 30.72 KG/M2 | HEART RATE: 56 BPM | RESPIRATION RATE: 16 BRPM | OXYGEN SATURATION: 95 % | SYSTOLIC BLOOD PRESSURE: 104 MMHG | TEMPERATURE: 98.3 F | DIASTOLIC BLOOD PRESSURE: 76 MMHG

## 2023-05-26 DIAGNOSIS — Z51.11 ENCOUNTER FOR ANTINEOPLASTIC CHEMOTHERAPY: Primary | ICD-10-CM

## 2023-05-26 DIAGNOSIS — C09.9 MALIGNANT NEOPLASM OF TONSIL (H): ICD-10-CM

## 2023-05-26 DIAGNOSIS — C09.9 MALIGNANT NEOPLASM OF TONSIL (H): Primary | ICD-10-CM

## 2023-05-26 PROCEDURE — 250N000011 HC RX IP 250 OP 636: Performed by: INTERNAL MEDICINE

## 2023-05-26 PROCEDURE — 96523 IRRIG DRUG DELIVERY DEVICE: CPT

## 2023-05-26 PROCEDURE — 99214 OFFICE O/P EST MOD 30 MIN: CPT | Performed by: INTERNAL MEDICINE

## 2023-05-26 RX ORDER — HEPARIN SODIUM (PORCINE) LOCK FLUSH IV SOLN 100 UNIT/ML 100 UNIT/ML
5 SOLUTION INTRAVENOUS
Status: DISCONTINUED | OUTPATIENT
Start: 2023-05-26 | End: 2023-05-27 | Stop reason: HOSPADM

## 2023-05-26 RX ORDER — HEPARIN SODIUM (PORCINE) LOCK FLUSH IV SOLN 100 UNIT/ML 100 UNIT/ML
5 SOLUTION INTRAVENOUS
Status: CANCELLED | OUTPATIENT
Start: 2023-05-26

## 2023-05-26 RX ADMIN — HEPARIN 5 ML: 100 SYRINGE at 13:51

## 2023-05-26 ASSESSMENT — PAIN SCALES - GENERAL: PAINLEVEL: NO PAIN (0)

## 2023-05-26 NOTE — NURSING NOTE
Chief Complaint   Patient presents with     Oncology Clinic Visit     F/U cancer of tonsil     Medication Reconciliation: complete    Livier Wilson CMA (AAMA)

## 2023-05-26 NOTE — NURSING NOTE
Patients right chest port accessed using non-coring, 19 gauge, 3/4 needle. Port accessed per facility protocol.  Hand hygiene performed: yes   Mask donned by caregiver: yes Site prepped with CHG: yes Labs drawn: no Dressing applied using aseptic technique: yes Port flushed easily, without resistance. Flushed with 10 cc's normal saline.   Immediate blood return noted. 10 cc blood discarded.  0 vials blood draw and sent to lab for results.  Port flushed with 20 cc 0.9% normal saline and 5 cc heparinized saline.  Needle removed intact, sterile dressing applied.  Slight pressure applied for 30 seconds.   Patient tolerated port flush well, denies pain nor discomfort at this time. Patient instructed to leave dressing intact for a minimum of one hour, and to call with questions or concerns.  Patient states understanding and is in agreement with this plan. Patient discharged ambulatory. Jean S. Hammann, RN

## 2023-05-26 NOTE — PROGRESS NOTES
MEDICAL ONCOLOGY FOLLOW UP NOTE  May 26, 2023    Reason for follow up: Head and neck cancer.     HISTORY OF PRESENT ILLNESS  Melchor Sadler is a 62 year old male with PMH as stated below who is seen in the oncology clinic for consultation of his head and neck cancer.     His history in short is as follows:    Mr. Sadler initially felt a swelling in his neck 1 year ago. It was monitored. He then underwent a CT scan when he was found to have laceration of the scalp.    8/23/2022: CT soft tissue neck mass: 3.8 cm probable everardo mass in the left jugulodigastric chain. Adjacent also enlarged 2.3 cm long axis node. Differential considerations favor  metastatic disease. A mucosal mass is questioned at the roof of the left tonsillar pillar.    He was evaluated by Dr. Houston at 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     Recommended PET scan and referral to radiation oncology and medical oncology for discussion regarding concurrent chemoRT.     10/26/2022: Radiation oncology consultation with recommendations for concurrent chemoRT.     11/2/2022: PET scan: IMPRESSION: Large left anterior cervical chain lymph node metastasismeasuring 4.0 x 3.5 cm in dimension. There are a few mildly enlarged  adjacent left anterior cervical chain lymph nodes with uptake not significantly greater than that of background uptake. No evidence of distant metastatic disease otherwise.    11/29/2022 to 1/20/2023 : cisplatin 40 mg/m2 chemotherapy with concurrent RT.    4/26/2023: PET scan:Significant interval improvement. No evidence of abnormal FDG uptake that would suggest residual.4 cm mass seen previously in the left side of the neck has decreased significantly in size currently measuring 1.8 cm in diameter    Interim history     Mr. Sadler is doing well. He denies any mouth sores or pain although he does not feel he is back to  his baseline. Denies any fever or chills. Denies any nausea or vomiting. Denies any neuropathy. Slowly gaining back his lost weight. No skin changes.     REVIEW OF SYSTEMS  A 12-point ROS negative except as in HPI      Current Outpatient Medications   Medication Sig Dispense Refill     GARLIC PO Take 1 tablet by mouth daily       magnesium oxide (MAG-OX) 400 MG tablet Take 400 mg by mouth daily       NONFORMULARY Vitamin C Po daily       Selenium 200 MCG TABS tablet Take 200 mcg by mouth daily       UNKNOWN TO PATIENT Vitamin D, unsure of dose       zinc gluconate 50 MG tablet Take 50 mg by mouth daily       NONFORMULARY Bone broth (Patient not taking: Reported on 2/13/2023)         Allergies   Allergen Reactions     Azithromycin Other (See Comments)     Panic attacks     Codeine Other (See Comments)     Anxiety     Penicillins Anxiety     Pt states his sister and father are allergic, but he doesn't believe he is. 11/11/2022     Immunization History   Administered Date(s) Administered     MMR 09/11/2009, 09/11/2009     TDAP (Adacel,Boostrix) 01/30/2013       Past Medical History:   Diagnosis Date     Malignant neoplasm of tonsil (H) 10/04/2022    Per [facility] Chart     Oropharyngeal cancer (H) 09/13/2022    Tonsil Cancer     GLENDA (obstructive sleep apnea) 09/22/2022    Per [facility] chart       Past Surgical History:   Procedure Laterality Date     INSERT PORT VASCULAR ACCESS N/A 11/11/2022    Procedure: Port-a-cath placement;  Surgeon: Teodoro Avila MD;  Location: [facility] OR     Panendoscopy, biopsy left palate  09/22/2022    [facility]-Dr. Jamey Houston       SOCIAL HISTORY  History   Smoking Status     Never   Smokeless Tobacco     Former     Types: Chew     Quit date: 1995    Social History    Substance and Sexual Activity      Alcohol use: Not Currently     History   Drug Use No       FAMILY HISTORY  Family History   Problem Relation Age of Onset     Lung Cancer Mother      Cancer Mother      Diabetes Father       Colon Cancer Maternal Grandmother      Hyperlipidemia No family hx of      Coronary Artery Disease No family hx of      Hypertension No family hx of      Breast Cancer No family hx of      Prostate Cancer No family hx of      Depression No family hx of      Anxiety Disorder No family hx of      Mental Illness No family hx of      Anesthesia Reaction No family hx of      Asthma No family hx of        PHYSICAL EXAMINATION  /76   Pulse 56   Temp 98.3  F (36.8  C) (Tympanic)   Resp 16   Wt 94.3 kg (208 lb)   SpO2 95%   BMI 30.72 kg/m    Wt Readings from Last 2 Encounters:   05/26/23 94.3 kg (208 lb)   04/27/23 92.2 kg (203 lb 4.2 oz)     Physical Exam  HENT:      Mouth/Throat:      Mouth: Mucous membranes are moist.   Eyes:      Conjunctiva/sclera: Conjunctivae normal.   Neck:      Comments: Fullness left in left neck. No able to palpate discrete mass.   Pulmonary:      Effort: Pulmonary effort is normal.   Neurological:      General: No focal deficit present.      Mental Status: He is alert and oriented to person, place, and time.     LABORATORY AND IMAGING   Latest Reference Range & Units 04/27/23 13:24   WBC 4.0 - 11.0 10e3/uL 4.9   Hemoglobin 13.3 - 17.7 g/dL 15.4   Hematocrit 40.0 - 53.0 % 42.9   Platelet Count 150 - 450 10e3/uL 168       ASSESSMENT AND PLAN    1. 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     Referred by ENT for discussion regarding concurrent chemoRT. Has met with radiation oncology. PET scan did not show any distant disease.     Completed concurrent chemoRT. 11/2022 to 1/2023.     PET scan done in 3 months showed decrease in size of mass in left neck from 4 cm to 1.8 cm. Not aivd. Plan per ENT to follow up clinically for now and may consider repeat PET scan in the future if mass persists.     He is being monitored quite closely by ENT and therefore will continue to follow up with ENT as needed. If no concern for persistent disease or recurrent in the  upcoming months can get the port out.     2. Nighttime Hypoxia.     Has CPAP now.     Total time spent on the patient on day of encounter was 30 minutes doing chart review, review of test results, interpretation of results, patient visit and documentation.      Ching Pruitt MD

## 2023-06-13 ENCOUNTER — OFFICE VISIT (OUTPATIENT)
Dept: OTOLARYNGOLOGY | Facility: OTHER | Age: 63
End: 2023-06-13
Attending: OTOLARYNGOLOGY
Payer: COMMERCIAL

## 2023-06-13 DIAGNOSIS — C09.9 TONSIL CANCER (H): Primary | ICD-10-CM

## 2023-06-13 PROCEDURE — G0463 HOSPITAL OUTPT CLINIC VISIT: HCPCS

## 2023-06-15 NOTE — PROGRESS NOTES
document embedded image  Patient Name: Melchor Sadler    Address: [address] [place] [address]     YOB: 1960    [place] [address]    MR Number: [id number]    Phone: [phone]  PCP: Gerda Evans MD            Appointment Date: 06/13/23   Visit Provider: Jamey Houston MD    cc: Gerda Evans MD; ~    ENT Progress Note  Intake  Visit Reasons: Cancer follow up    HPI  History of Present Illness  Chief complaint:  Follow-up head neck cancer    History  The patient is a 62-year-old man who was diagnosed with a T1 N2 left tonsil cancer in October of 2022.  He was treated with chemotherapy and radiation therapy.  PET scans in reveal no residual activity in his neck or primary site.  He is had a persistent palpable lump in his left neck but has not shown any growth since treatment.  He has no new complaints here today.     Exam  Oral cavity oropharynx-free of mucosal lesions or inflammation  Indirect laryngoscopy reveals Clear hypopharynx and larynx  Bimanual exam-no palpable tongue base or tonsillar fossa lesions  Neck-he does have a small palpable nodule that measures about a cm in his jugular digastric region on the left.  There are no other palpable lymph nodes.  Nasal-no obstruction or purulence  Head neck integument-Clear  General-the patient appears well and in no distress  Neuro-there are focal cranial deficits    Allergies    codeine Adverse Reaction (Verified 09/22/22 12:35)  Anxiety  Penicillins Adverse Reaction (Verified 09/22/22 12:35)  Unknown    PFSH  PFSH:     Social History: (Reviewed 09/22/22 @ 14:45 by Jeramie Weaver MD)  Smoking Status:  Never smoker       A&P  Assessment & Plan  (1) Tonsil cancer:        Status: Acute        Code(s):  C09.9 - Malignant neoplasm of tonsil, unspecified  We will continue monthly follow-up visits until October.               Jamey Houston MD    Filed: 06/14/23 1121    <Electronically signed by Jamey Houston MD> 06/14/23 7047

## 2023-06-30 ENCOUNTER — HOSPITAL ENCOUNTER (OUTPATIENT)
Dept: INFUSION THERAPY | Facility: OTHER | Age: 63
Discharge: HOME | End: 2023-06-30
Attending: INTERNAL MEDICINE | Admitting: INTERNAL MEDICINE
Payer: COMMERCIAL

## 2023-06-30 DIAGNOSIS — Z51.11 ENCOUNTER FOR ANTINEOPLASTIC CHEMOTHERAPY: Primary | ICD-10-CM

## 2023-06-30 DIAGNOSIS — C09.9 MALIGNANT NEOPLASM OF TONSIL (H): ICD-10-CM

## 2023-06-30 PROCEDURE — 250N000011 HC RX IP 250 OP 636: Mod: JZ | Performed by: INTERNAL MEDICINE

## 2023-06-30 PROCEDURE — 96523 IRRIG DRUG DELIVERY DEVICE: CPT

## 2023-06-30 RX ORDER — HEPARIN SODIUM (PORCINE) LOCK FLUSH IV SOLN 100 UNIT/ML 100 UNIT/ML
5 SOLUTION INTRAVENOUS
Status: DISCONTINUED | OUTPATIENT
Start: 2023-06-30 | End: 2023-07-01 | Stop reason: HOSPADM

## 2023-06-30 RX ORDER — HEPARIN SODIUM (PORCINE) LOCK FLUSH IV SOLN 100 UNIT/ML 100 UNIT/ML
5 SOLUTION INTRAVENOUS
Status: CANCELLED | OUTPATIENT
Start: 2023-06-30

## 2023-06-30 RX ADMIN — HEPARIN 5 ML: 100 SYRINGE at 08:14

## 2023-06-30 NOTE — NURSING NOTE
Patients  port accessed using non-coring, 19 gauge, 3/4 needle. Port accessed per facility protocol.  Hand hygiene performed: yes   Mask donned by caregiver: yes Site prepped with CHG: yes Labs drawn: yes Dressing applied using aseptic technique: yes Port flushed easily, without resistance. Flushed with 10 cc's normal saline.   Immediate blood return noted. 10 cc blood discarded.  0 vials blood draw and sent to lab for results.  Port flushed with 20 cc 0.9% normal saline and 5 cc heparinized saline.  Needle removed intact, sterile dressing applied.  Slight pressure applied for 30 seconds.   Patient tolerated port flush well, denies pain nor discomfort at this time. Patient instructed to leave dressing intact for a minimum of one hour, and to call with questions or concerns.  Patient states understanding and is in agreement with this plan. Patient discharged ambulatory. Talya Whyte RN

## 2023-07-18 ENCOUNTER — OFFICE VISIT (OUTPATIENT)
Dept: OTOLARYNGOLOGY | Facility: OTHER | Age: 63
End: 2023-07-18
Attending: OTOLARYNGOLOGY
Payer: COMMERCIAL

## 2023-07-18 DIAGNOSIS — C09.9 TONSIL CANCER (H): Primary | ICD-10-CM

## 2023-07-18 PROCEDURE — G0463 HOSPITAL OUTPT CLINIC VISIT: HCPCS

## 2023-07-18 NOTE — PROGRESS NOTES
Patient is being seen today by Dr. Houston, ENT, for a cancer check.  Lucila Kimble LPN on 7/18/2023 at 1:03 PM

## 2023-07-20 NOTE — PROGRESS NOTES
document embedded image  Patient Name: Melchor Sadler    Address: [address] [place] [address]     YOB: 1960    [place] [address]    MR Number: [id number]    Phone: [phone]  PCP: Gerda Evans MD            Appointment Date: 07/18/23   Visit Provider: Jamey Houston MD    cc: Gerda Evans MD; ~    ENT Progress Note  Intake  Visit Reasons: Cancer check    HPI  History of Present Illness  Chief complaint:  Follow-up head neck cancer    History  The patient is a 62-year-old man who was diagnosed with a T1 N2 left tonsil cancer in October of 2022.  The tumor was HPV positive.  He was treated with combined chemotherapy and radiation therapy in his done well.  He is a small residual palpable scar in his left neck at the site of his neck disease.  His post treatment PET scan failed to reveal any residual activity at this site.  He presents to the office today without new concern or complaint.    Exam  Oral cavity oropharynx-free of mucosal lesions or inflammation  Neck-he still has the small palpable nodule in his left jugulodigastric region.  This certainly has not changed any over the last several months.  Nasal-no obstruction or purulence  Indirect laryngoscopy-Clear hypopharynx and larynx  Bimanual exam-no palpable tongue base or tonsillar fossa lesions  General-the patient appears well and in no distress  Neuro-there are no cranial nerve deficits    Allergies    codeine Adverse Reaction (Verified 09/22/22 12:35)  Anxiety  Penicillins Adverse Reaction (Verified 09/22/22 12:35)  Unknown    PFSH  PFSH:     Social History: (Reviewed 09/22/22 @ 14:45 by Jeramie Weaver MD)  Smoking Status:  Never smoker       A&P  Assessment & Plan  (1) Tonsil cancer:        Status: Acute        Code(s):  C09.9 - Malignant neoplasm of tonsil, unspecified  We will continue monthly follow-up visits until October.               Jamey Houston MD    Filed: 07/19/23 0948    <Electronically signed by  Jamey Houston MD> 07/19/23 1038

## 2023-08-11 ENCOUNTER — HOSPITAL ENCOUNTER (OUTPATIENT)
Dept: INFUSION THERAPY | Facility: OTHER | Age: 63
Discharge: HOME | End: 2023-08-11
Attending: INTERNAL MEDICINE | Admitting: INTERNAL MEDICINE
Payer: COMMERCIAL

## 2023-08-11 DIAGNOSIS — Z51.11 ENCOUNTER FOR ANTINEOPLASTIC CHEMOTHERAPY: ICD-10-CM

## 2023-08-11 DIAGNOSIS — C09.9 MALIGNANT NEOPLASM OF TONSIL (H): Primary | ICD-10-CM

## 2023-08-11 PROCEDURE — 96523 IRRIG DRUG DELIVERY DEVICE: CPT

## 2023-08-11 PROCEDURE — 250N000011 HC RX IP 250 OP 636: Mod: JZ | Performed by: INTERNAL MEDICINE

## 2023-08-11 RX ORDER — HEPARIN SODIUM (PORCINE) LOCK FLUSH IV SOLN 100 UNIT/ML 100 UNIT/ML
5 SOLUTION INTRAVENOUS
Status: CANCELLED | OUTPATIENT
Start: 2023-08-11

## 2023-08-11 RX ORDER — HEPARIN SODIUM (PORCINE) LOCK FLUSH IV SOLN 100 UNIT/ML 100 UNIT/ML
5 SOLUTION INTRAVENOUS
Status: DISCONTINUED | OUTPATIENT
Start: 2023-08-11 | End: 2023-08-12 | Stop reason: HOSPADM

## 2023-08-11 RX ADMIN — HEPARIN 5 ML: 100 SYRINGE at 08:38

## 2023-08-11 NOTE — NURSING NOTE
Patients power port accessed using non-coring, 19 gauge, 3/4 needle. Port accessed per facility protocol.  Hand hygiene performed: yes   Mask donned by caregiver: yes Site prepped with CHG: yes Labs drawn: no Dressing applied using aseptic technique: yes Port flushed easily, without resistance. Flushed with 10 cc's normal saline.   Immediate blood return noted. 10 cc blood discarded.  0 vials blood draw and sent to lab for results.  Port flushed with 20 cc 0.9% normal saline and 5 cc heparinized saline.  Needle removed intact, sterile dressing applied.  Slight pressure applied for 30 seconds.   Patient tolerated port flush well, denies pain nor discomfort at this time. Patient instructed to leave dressing intact for a minimum of one hour, and to call with questions or concerns.  Patient states understanding and is in agreement with this plan. Patient discharged ambulatory. Jean S. Hammann, RN

## 2023-08-22 ENCOUNTER — OFFICE VISIT (OUTPATIENT)
Dept: OTOLARYNGOLOGY | Facility: OTHER | Age: 63
End: 2023-08-22
Attending: OTOLARYNGOLOGY
Payer: COMMERCIAL

## 2023-08-22 DIAGNOSIS — C09.9 TONSIL CANCER (H): Primary | ICD-10-CM

## 2023-08-22 PROCEDURE — G0463 HOSPITAL OUTPT CLINIC VISIT: HCPCS

## 2023-08-22 NOTE — PROGRESS NOTES
Patient is being seen today by Dr. Houston, ENT, for a Cancer Follow Up.  Lucila Kimble LPN on 8/22/2023 at 2:44 PM

## 2023-08-25 NOTE — PROGRESS NOTES
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  He was treated successfully with chemotherapy and radiation therapy.  Assistant palpable nodule and jugular digastric region on the left but his posttreatment PET scan failed to reveal any activity in this is showed no growth since completing therapy.  Presents today without new complaint.     Exam  Oral cavity oropharynx-free of mucosal lesions or inflammation  Nasal-no obstruction or purulence  Neck-he does have the palpable scar in the left jugular digastric region that measures less than a cm.  There is no palpable adenopathy.  Head and neck integument-Clear  Indirect laryngoscopy reveals Clear hypopharynx and larynx  Bimanual exam-no palpable tongue base or tonsillar fossa lesions  General-the patient appears well and in no distress  Neuro-there are no focal cranial nerve deficits    Allergies    codeine Adverse Reaction (Verified 09/22/22 12:35)  Anxiety  Penicillins Adverse Reaction (Verified 09/22/22 12:35)  Unknown    PFSH  PFSH:     Social History: (Reviewed 09/22/22 @ 14:45 by Jeramie Weaver MD)  Smoking Status:  Never smoker       A&P  Assessment & Plan  (1) Tonsil cancer:        Status: Acute        Code(s):  C09.9 - Malignant neoplasm of tonsil, unspecified  He will continue monthly follow-up visits until October.               Jamey Houston MD    Filed: 08/23/23 6423    <Electronically signed by Jamey Houston  MD> 08/23/23 1183

## 2023-09-19 ENCOUNTER — OFFICE VISIT (OUTPATIENT)
Dept: OTOLARYNGOLOGY | Facility: OTHER | Age: 63
End: 2023-09-19
Attending: OTOLARYNGOLOGY
Payer: COMMERCIAL

## 2023-09-19 DIAGNOSIS — C09.9 TONSIL CANCER (H): Primary | ICD-10-CM

## 2023-09-19 PROCEDURE — G0463 HOSPITAL OUTPT CLINIC VISIT: HCPCS

## 2023-09-19 NOTE — PROGRESS NOTES
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  He was successfully treated with chemotherapy and radiation therapy for organ preservation.  He is done well and presents today without new complaint.    Exam  Oral cavity oropharynx-free of mucosal lesions or inflammation  Neck-no palpable masses or adenopathy  Indirect laryngoscopy reveals Clear hypopharynx and larynx  Bimanual exam-no palpable tonsillar tongue base lesions  Neuro-there are no focal cranial nerve deficits  Head and neck integument-Clear  General-the patient appears well and in no distress    Allergies    codeine Adverse Reaction (Verified 09/22/22 12:35)  Anxiety  Penicillins Adverse Reaction (Verified 09/22/22 12:35)  Unknown    PFSH  PFSH:     Social History: (Reviewed 09/22/22 @ 14:45 by Jeramie Weaver MD)  Smoking Status:  Never smoker       A&P  Assessment & Plan  (1) Tonsil cancer:        Status: Acute        Code(s):  C09.9 - Malignant neoplasm of tonsil, unspecified  We will extend follow-up to every other month in the coming year.               Jamey Houston MD    Filed: 09/21/23 1456    <Electronically signed by Jamey Houston MD> 09/21/23 2228

## 2023-09-22 ENCOUNTER — HOSPITAL ENCOUNTER (OUTPATIENT)
Dept: INFUSION THERAPY | Facility: OTHER | Age: 63
Discharge: HOME | End: 2023-09-22
Attending: INTERNAL MEDICINE | Admitting: INTERNAL MEDICINE
Payer: COMMERCIAL

## 2023-09-22 ENCOUNTER — TELEPHONE (OUTPATIENT)
Dept: ONCOLOGY | Facility: OTHER | Age: 63
End: 2023-09-22
Payer: COMMERCIAL

## 2023-09-22 DIAGNOSIS — Z95.828 PORT-A-CATH IN PLACE: Primary | ICD-10-CM

## 2023-09-22 DIAGNOSIS — Z51.11 ENCOUNTER FOR ANTINEOPLASTIC CHEMOTHERAPY: ICD-10-CM

## 2023-09-22 DIAGNOSIS — C09.9 MALIGNANT NEOPLASM OF TONSIL (H): Primary | ICD-10-CM

## 2023-09-22 PROCEDURE — 96523 IRRIG DRUG DELIVERY DEVICE: CPT

## 2023-09-22 PROCEDURE — 250N000011 HC RX IP 250 OP 636: Mod: JZ | Performed by: INTERNAL MEDICINE

## 2023-09-22 RX ORDER — HEPARIN SODIUM (PORCINE) LOCK FLUSH IV SOLN 100 UNIT/ML 100 UNIT/ML
5 SOLUTION INTRAVENOUS
Status: DISCONTINUED | OUTPATIENT
Start: 2023-09-22 | End: 2023-09-23 | Stop reason: HOSPADM

## 2023-09-22 RX ORDER — HEPARIN SODIUM (PORCINE) LOCK FLUSH IV SOLN 100 UNIT/ML 100 UNIT/ML
5 SOLUTION INTRAVENOUS
OUTPATIENT
Start: 2023-09-22

## 2023-09-22 RX ADMIN — HEPARIN 5 ML: 100 SYRINGE at 08:41

## 2023-09-22 NOTE — TELEPHONE ENCOUNTER
He had it placed in [place], but would prefer to have done here.     Per Dr Pruitt:    Ching Pruitt MD Olson, Cindy; Madelin Maki, CLIFF2 hours ago (9:44 AM)     He can have the port removed. Please let him know     Referral placed.   Patient notified.  Arti Dickens CMA([facility])..................9/22/2023   11:51 AM

## 2023-09-22 NOTE — TELEPHONE ENCOUNTER
Melchor was here in clinic today for a port flush.  He would like to have his port removed if possible.  Can you please contact him/ [phone] and let him know your thoughts if this is a possibility.    Thank you,  Sandra Avitia on 9/22/2023 at 8:34 AM

## 2023-09-29 ENCOUNTER — TELEPHONE (OUTPATIENT)
Dept: SURGERY | Facility: OTHER | Age: 63
End: 2023-09-29
Payer: COMMERCIAL

## 2023-09-29 NOTE — TELEPHONE ENCOUNTER
Patient is wondering how long it takes to heal after port removal.  Please call.  Iris Garcia on 9/29/2023 at 2:45 PM

## 2023-10-03 NOTE — TELEPHONE ENCOUNTER
Verified the patients name and date of birth. Updated the patient on what to expect during the prot removal process. Discussed healing. Patient had no further questions at th end of the call. Jessica Arana RN  ....................  10/3/2023   11:40 AM

## 2023-10-08 ENCOUNTER — HEALTH MAINTENANCE LETTER (OUTPATIENT)
Age: 63
End: 2023-10-08

## 2023-10-14 ENCOUNTER — OFFICE VISIT (OUTPATIENT)
Dept: FAMILY MEDICINE | Facility: OTHER | Age: 63
End: 2023-10-14
Payer: COMMERCIAL

## 2023-10-14 VITALS
OXYGEN SATURATION: 95 % | RESPIRATION RATE: 16 BRPM | DIASTOLIC BLOOD PRESSURE: 72 MMHG | BODY MASS INDEX: 33.43 KG/M2 | TEMPERATURE: 98 F | SYSTOLIC BLOOD PRESSURE: 134 MMHG | HEART RATE: 63 BPM | WEIGHT: 226.4 LBS

## 2023-10-14 DIAGNOSIS — J02.9 ACUTE VIRAL PHARYNGITIS: Primary | ICD-10-CM

## 2023-10-14 DIAGNOSIS — J02.9 SORE THROAT: ICD-10-CM

## 2023-10-14 LAB
GROUP A STREP BY PCR: NOT DETECTED
SARS-COV-2 RNA RESP QL NAA+PROBE: NEGATIVE

## 2023-10-14 PROCEDURE — 87635 SARS-COV-2 COVID-19 AMP PRB: CPT | Mod: ZL

## 2023-10-14 PROCEDURE — 99213 OFFICE O/P EST LOW 20 MIN: CPT

## 2023-10-14 PROCEDURE — C9803 HOPD COVID-19 SPEC COLLECT: HCPCS

## 2023-10-14 PROCEDURE — 87651 STREP A DNA AMP PROBE: CPT | Mod: ZL

## 2023-10-14 ASSESSMENT — PAIN SCALES - GENERAL: PAINLEVEL: MILD PAIN (2)

## 2023-10-14 NOTE — NURSING NOTE
Patient presents to clinic for concern of sore throat  /72   Pulse 63   Temp 98  F (36.7  C) (Tympanic)   Resp 16   Wt 102.7 kg (226 lb 6.4 oz)   SpO2 95%   BMI 33.43 kg/m    Isa Avitia LPN on 10/14/2023 at 11:04 AM

## 2023-10-14 NOTE — PROGRESS NOTES
ASSESSMENT/PLAN:    I have reviewed the nursing notes.  I have reviewed the findings, diagnosis, plan and need for follow up with the patient.    1. Acute viral pharyngitis  2. Sore throat  - Group A Streptococcus PCR Throat Swab  - Symptomatic COVID-19 Virus (Coronavirus) by PCR Nose    Patient presents with a sore throat.  Patient's vitals are stable and he appears nontoxic.  His strep and COVID test were both negative. Discussed with patient that symptoms and exam are consistent with viral illness.  Discussed that symptomatic treatment is appropriate but not with antibiotics. Discussed symptomatic treatment - Encouraged fluids, salt water gargles, honey, elevation, humidifier, lozenges, tea, topical vapor rub, popsicles, rest, etc. May use over-the-counter Tylenol or ibuprofen PRN.    Discussed warning signs/symptoms indicative of need to f/u    Follow up if symptoms persist or worsen or concerns    I explained my diagnostic considerations and recommendations to the patient, who voiced understanding and agreement with the treatment plan. All questions were answered. We discussed potential side effects of any prescribed or recommended therapies, as well as expectations for response to treatments.    Alexis Collins, СЕРГЕЙ CNP  10/14/2023  11:17 AM    HPI:    Melchor Sadler is a 62 year old male  who presents to [facility] today for concerns of sore throat    URI, x 3 days    Symptoms:  No fevers or chills.   YES: +  sore throat/pharyngitis/tonsillitis.   YES: +  allergy/URI Symptoms  No muffled sounds/change in hearing  No sensation of fullness in ear(s)  No ringing in ears/tinnitus  No balance changes  No dizziness  YES: +  congestion (head/nasal/chest)  YES: +  cough/productive cough  No post nasal drip   YES: +  headache  YES: +  sinus pain/pressure  No myalgias  No otalgia  No rash  Activity Level Changes: No  Appetite/Liquid Intake Changes: No  Changes to Bowel Habits: No  Changes to Bladder Habits:  No  Additional Symptoms to Report: No  History of similar symptoms: Yes  Prior workup: No    Treatments tried: Antihistamine, Fluids, and Rest    Site of exposure: workplace  Type of exposure: strep throat    Other Pertinent History: none    Allergies: reviewed    PCP: none    Past Medical History:   Diagnosis Date    Malignant neoplasm of tonsil ([facility]) 10/04/2022    Per [facility] Chart    Oropharyngeal cancer ([facility]) 09/13/2022    Tonsil Cancer    GLENDA (obstructive sleep apnea) 09/22/2022    Per [facility] chart     Past Surgical History:   Procedure Laterality Date    INSERT PORT VASCULAR ACCESS N/A 11/11/2022    Procedure: Port-a-cath placement;  Surgeon: Teodoro Avila MD;  Location: [facility] OR    Panendoscopy, biopsy left palate  09/22/2022    [facility]-Dr. Jamey Houston     Social History     Tobacco Use    Smoking status: Never     Passive exposure: Past    Smokeless tobacco: Former     Types: Chew     Quit date: 1995    Tobacco comments:     Passive exposure in childhood home.    Substance Use Topics    Alcohol use: Not Currently     Current Outpatient Medications   Medication Sig Dispense Refill    GARLIC PO Take 1 tablet by mouth daily      magnesium oxide (MAG-OX) 400 MG tablet Take 400 mg by mouth daily      NONFORMULARY Vitamin C Po daily      Selenium 200 MCG TABS tablet Take 200 mcg by mouth daily      UNKNOWN TO PATIENT Vitamin D, unsure of dose      zinc gluconate 50 MG tablet Take 50 mg by mouth daily      NONFORMULARY Bone broth (Patient not taking: Reported on 10/14/2023)       Allergies   Allergen Reactions    Azithromycin Other (See Comments)     Panic attacks    Codeine Other (See Comments)     Anxiety    Penicillins Anxiety     Pt states his sister and father are allergic, but he doesn't believe he is. 11/11/2022     Past medical history, past surgical history, current medications and allergies reviewed and accurate to the best of my knowledge.      ROS:  Refer to HPI    /72   Pulse 63   Temp 98   F (36.7  C) (Tympanic)   Resp 16   Wt 102.7 kg (226 lb 6.4 oz)   SpO2 95%   BMI 33.43 kg/m      EXAM:  General Appearance: Well appearing 62 year old male, appropriate appearance for age. No acute distress   Ears: Left TM intact, translucent with bony landmarks appreciated, no erythema, no effusion, no bulging, no purulence.  Right TM intact, translucent with bony landmarks appreciated, no erythema, no effusion, no bulging, no purulence.  Left auditory canal clear.  Right auditory canal clear.  Normal external ears, non tender.  Eyes: conjunctivae normal without erythema or irritation, corneas clear, no drainage or crusting, no eyelid swelling, pupils equal   Oropharynx: moist mucous membranes, posterior pharynx without erythema, tonsils symmetric and 1+, no erythema, no exudates or petechiae, no post nasal drip seen, no trismus, voice clear.    Sinuses:  No sinus tenderness upon palpation of the frontal or maxillary sinuses  Nose:  Bilateral nares: no erythema, no edema, no drainage or congestion   Neck: supple without adenopathy  Respiratory: normal chest wall and respirations.  Normal effort.  Clear to auscultation bilaterally, no wheezing, crackles or rhonchi.  No increased work of breathing.  No cough appreciated.  Cardiac: RRR with no murmurs   Musculoskeletal:  Equal movement of bilateral upper extremities.  Equal movement of bilateral lower extremities.  Normal gait.    Dermatological: no rashes noted of exposed skin  Neuro: Alert and oriented to person, place, and time.    Psychological: normal affect, alert, oriented, and pleasant.     Labs:  Results for orders placed or performed in visit on 10/14/23   Symptomatic COVID-19 Virus (Coronavirus) by PCR Nose     Status: Normal    Specimen: Nose; Swab   Result Value Ref Range    SARS CoV2 PCR Negative Negative    Narrative    Testing was performed using the Xpert Xpress SARS-CoV-2 Assay on the Cepheid Gene-Xpert Instrument Systems. Additional information  about this Emergency Use Authorization (EUA) assay can be found via the Lab Guide. This test should be ordered for the detection of SARS-CoV-2 in individuals who meet SARS-CoV-2 clinical and/or epidemiological criteria as well as from individuals without symptoms or other reasons to suspect COVID-19. Test performance for asymptomatic patients has only been established in anterior nasal swab specimens. This test is for in vitro diagnostic use under the FDA EUA for laboratories certified under CLIA to perform high complexity testing. This test has not been FDA cleared or approved. A negative result does not rule out the presence of PCR inhibitors in the specimen or target RNA concentration below the limit of detection for the assay. The possibility of a false negative should be considered if the patient's recent exposure or clinical presentation suggests COVID-19. This test was validated by [facility]. This laboratory is certified under the Clinical Laboratory Improvement Amendments (CLIA) as qualified to perform high complexity clinical laboratory testing.   Group A Streptococcus PCR Throat Swab     Status: Normal    Specimen: Throat; Swab   Result Value Ref Range    Group A strep by PCR Not Detected Not Detected    Narrative    The Xpert Xpress Strep A test, performed on the [employer] Systems, is a rapid, qualitative in vitro diagnostic test for the detection of Streptococcus pyogenes (Group A ß-hemolytic Streptococcus, Strep A) in throat swab specimens from patients with signs and symptoms of pharyngitis. The Xpert Xpress Strep A test can be used as an aid in the diagnosis of Group A Streptococcal pharyngitis. The assay is not intended to monitor treatment for Group A Streptococcus infections. The Xpert Xpress Strep A test utilizes an automated real-time polymerase chain reaction (PCR) to detect Streptococcus pyogenes DNA.

## 2023-10-31 ENCOUNTER — OFFICE VISIT (OUTPATIENT)
Dept: SURGERY | Facility: OTHER | Age: 63
End: 2023-10-31
Attending: SURGERY
Payer: COMMERCIAL

## 2023-10-31 VITALS
HEART RATE: 64 BPM | TEMPERATURE: 97 F | SYSTOLIC BLOOD PRESSURE: 120 MMHG | OXYGEN SATURATION: 97 % | HEIGHT: 69 IN | DIASTOLIC BLOOD PRESSURE: 80 MMHG | RESPIRATION RATE: 16 BRPM | WEIGHT: 226 LBS | BODY MASS INDEX: 33.47 KG/M2

## 2023-10-31 DIAGNOSIS — Z45.2 ENCOUNTER FOR CARE RELATED TO PORT-A-CATH: Primary | ICD-10-CM

## 2023-10-31 PROCEDURE — 36590 REMOVAL TUNNELED CV CATH: CPT | Performed by: SURGERY

## 2023-10-31 ASSESSMENT — PAIN SCALES - GENERAL: PAINLEVEL: NO PAIN (0)

## 2023-10-31 NOTE — PATIENT INSTRUCTIONS
Your incision was closed with stitches that will dissolve.  A glue was used to help keep the incision closed.    It is ok to remove the dressing and get the incision wet in the shower on the day after your procedure.     Don't soak in a tub, pool or lake for 7 days.   If you have concerns, please call.

## 2023-10-31 NOTE — NURSING NOTE
"Chief Complaint   Patient presents with    Derm Problem     Here today to have his power port removed.       Initial /80 (BP Location: Left arm, Patient Position: Sitting, Cuff Size: Adult Large)   Pulse 64   Temp 97  F (36.1  C) (Tympanic)   Resp 16   Ht 1.753 m (5' 9\")   Wt 102.5 kg (226 lb)   SpO2 97%   BMI 33.37 kg/m   Estimated body mass index is 33.37 kg/m  as calculated from the following:    Height as of this encounter: 1.753 m (5' 9\").    Weight as of this encounter: 102.5 kg (226 lb).  Medication Reconciliation: complete    Margarita Owusu LPN    TIMEOUT  [place] Protocol    A. Pre-procedure verification complete yes  1-relevant information / documentation available, reviewed and properly matched to the patient; 2-consent accurate and complete, 3-equipment and supplies available    B. Site marking complete Yes      Site marked if not in continuous attendance with patient    C. TIME OUT completed yes  Time Out was conducted just prior to starting procedure to verify the eight required elements: 1-patient identity, 2-consent accurate and complete, 3-position, 4-correct side/site marked (if applicable), 5-procedure, 6-relevant images / results properly labeled and displayed (if applicable), 7-antibiotics / irrigation fluids (if applicable), 8-safety precautions.     "

## 2023-10-31 NOTE — PROGRESS NOTES
Procedure Note      Pre/Post Operative Diagnosis:   Port no longer in use    Procedure:   Removal of Port no longer in use     Surgeon: Nando Avitia MD    Local Anesthesia: 1% lidocaine with 0.25% Marcaine with epinephrine    Indication for the procedure:  This is a 63 year old male with a Port no longer in use.       After explaining the risks to include bleeding, infection and scarring the patientwished to proceed.    Procedure:   The area was prepped and draped in usual sterile fashion with ChloraPrep.     After, adequate localanesthesia, 3 cm linear skin incision was made to remove the port via the old scar.  The skin was closed with 4-0 Monocryl and Dermabond.      Plan:   Patient will follow up if there any problems with the wound including redness or drainage.      Nando Avitia MD....................  10/31/2023   9:22 AM

## 2023-10-31 NOTE — NURSING NOTE
"Chief Complaint   Patient presents with    Derm Problem     Here today to have his power port removed.       Initial /80 (BP Location: Left arm, Patient Position: Sitting, Cuff Size: Adult Large)   Pulse 64   Temp 97  F (36.1  C) (Tympanic)   Resp 16   Ht 1.753 m (5' 9\")   Wt 102.5 kg (226 lb)   SpO2 97%   BMI 33.37 kg/m   Estimated body mass index is 33.37 kg/m  as calculated from the following:    Height as of this encounter: 1.753 m (5' 9\").    Weight as of this encounter: 102.5 kg (226 lb).  Medication Reconciliation: complete    Margarita Owusu LPN    "

## 2023-11-14 ENCOUNTER — OFFICE VISIT (OUTPATIENT)
Dept: OTOLARYNGOLOGY | Facility: OTHER | Age: 63
End: 2023-11-14
Payer: COMMERCIAL

## 2023-11-14 DIAGNOSIS — C09.9 TONSIL CANCER (H): Primary | ICD-10-CM

## 2023-11-14 PROCEDURE — G0463 HOSPITAL OUTPT CLINIC VISIT: HCPCS

## 2023-11-14 NOTE — PROGRESS NOTES
Patient is being seen today by Dr. Houston, ENT, for Cancer Follow Up .  Lucila Kimble LPN on 11/14/2023 at 2:42 PM

## 2023-11-17 NOTE — PROGRESS NOTES
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  He was treated with combined chemotherapy and radiation therapy and has done beautifully.  He presents today without new complaint.    Exam  Oral cavity oropharynx-free of mucosal lesions or inflammation  Neck-no masses or adenopathy  Nasal-no obstruction or purulence  Indirect laryngoscopy reveals Clear hypopharynx and larynx  Bimanual exam-no palpable tongue base or tonsillar fossa lesions  Head and neck integument-Clear  General-the patient appears well and in no distress  Neuro-there are no focal cranial nerve deficits    Allergies    codeine Adverse Reaction (Verified 09/22/22 12:35)  Anxiety  Penicillins Adverse Reaction (Verified 09/22/22 12:35)  Unknown    PFSH  PFSH:     Social History: (Reviewed 09/22/22 @ 14:45 by Jeramie Weaver MD)  Smoking Status:  Never smoker       A&P  Assessment & Plan  (1) Tonsil cancer:        Status: Acute        Code(s):  C09.9 - Malignant neoplasm of tonsil, unspecified  We will continue every other its until next September.               Jamey Houston MD    Filed: 11/16/23 1100    <Electronically signed by Jamey Houston MD> 11/16/23 8762

## 2024-01-16 ENCOUNTER — OFFICE VISIT (OUTPATIENT)
Dept: OTOLARYNGOLOGY | Facility: OTHER | Age: 64
End: 2024-01-16
Payer: COMMERCIAL

## 2024-01-16 DIAGNOSIS — C09.9 TONSIL CANCER (H): Primary | ICD-10-CM

## 2024-01-16 PROCEDURE — G0463 HOSPITAL OUTPT CLINIC VISIT: HCPCS

## 2024-01-16 NOTE — NURSING NOTE
Patient presents to clinic for follow up cancer check.  Medication Reconciliation: Complete    Brittany Mascorro LPN  1/16/2024 2:17 PM

## 2024-01-19 NOTE — PROGRESS NOTES
document embedded image  Patient Name: Melchor Sadler   Address: [address] [place] [address]    YOB: 1960   [place] [address]   MR Number: [id number]   Phone: [phone]  PCP: Gerda Evans MD           Appointment Date: 01/16/24  Visit Provider: Jamey Houston MD    cc: Gerda Evans MD; ~    ENT Progress Note    Intake  Visit Reasons: Cancer check    HPI  History of Present Illness  Chief complaint:  Follow-up head neck cancer     History  The patient is a 63-year-old man who was diagnosed with a 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  He completed chemotherapy and radiation therapy and has done well since.  He has no new symptoms to report today on follow-up.    Exam   Oral cavity oropharynx-free of mucosal lesions or inflammation   Nasal-no obstruction or purulence  Neck-no masses or adenopathy   Bimanual exam-no palpable tongue base or tonsillar fossa lesions   Indirect laryngoscopy reveals Clear hypopharynx and larynx  Head and neck integument-Clear  General-the patient appears well and in no distress  Neuro-there are no focal cranial nerve deficits appreciated    Allergies    codeine Adverse Reaction (Verified 09/22/22 12:35)  Anxiety  Penicillins Adverse Reaction (Verified 09/22/22 12:35)  Unknown    PFSH  PFSH:     Social History: (Reviewed 09/22/22 @ 14:45 by Jeramie Weaver MD)  Smoking Status:  Never smoker     A&P  Assessment & Plan  (1) Tonsil cancer:         Status: Acute        Code(s):  C09.9 - Malignant neoplasm of tonsil, unspecified  He will continue every 2 month follow-up visits until September.                Jamey Houston MD    Filed: 01/17/24 0994     <Electronically signed by Jamey Houston MD> 01/17/24 4894

## 2024-02-21 ENCOUNTER — TRANSFERRED RECORDS (OUTPATIENT)
Dept: HEALTH INFORMATION MANAGEMENT | Facility: OTHER | Age: 64
End: 2024-02-21

## 2024-03-19 ENCOUNTER — OFFICE VISIT (OUTPATIENT)
Dept: OTOLARYNGOLOGY | Facility: OTHER | Age: 64
End: 2024-03-19
Attending: OTOLARYNGOLOGY
Payer: COMMERCIAL

## 2024-03-19 DIAGNOSIS — C09.9 TONSIL CANCER (H): Primary | ICD-10-CM

## 2024-03-19 PROCEDURE — G0463 HOSPITAL OUTPT CLINIC VISIT: HCPCS

## 2024-03-21 NOTE — PROGRESS NOTES
document embedded image  Patient Name: Melchor Sadler   Address: [address] [place] [address]    YOB: 1960   [place] [address]   MR Number: [id number]   Phone: [phone]  PCP: Gerda Evans MD           Appointment Date: 03/19/24  Visit Provider: Jamey Houston MD    cc: Gerda Evans MD; ~    ENT Progress Note    Intake  Visit Reasons: Cancer check    HPI  History of Present Illness  Chief complaint:  Follow-up head neck cancer    History  The patient is a 63-year-old man who was diagnosed with a 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  He was treated with combined chemotherapy and radiation therapy and has done beautifully.  He is here today for routine follow-up without new complaint.    Exam   Oral cavity oropharynx-free of mucosal lesions or inflammation   Neck-no palpable masses or adenopathy   Bimanual exam-no palpable tongue base or tonsillar fossa lesions   Indirect laryngoscopy reveals Clear hypopharynx and larynx  Nasal-no obstruction or purulence  Head and neck integument-Clear General-the patient appears well and no distress  Neuro-there are no focal cranial nerve deficits    Allergies    codeine Adverse Reaction (Verified 09/22/22 12:35)  Anxiety  Penicillins Adverse Reaction (Verified 09/22/22 12:35)  Unknown    PFSH  PFSH:     Social History: (Reviewed 09/22/22 @ 14:45 by Jeramie Weaver MD)  Smoking Status:  Never smoker     A&P  Assessment & Plan  (1) Tonsil cancer:         Status: Acute        Code(s):  C09.9 - Malignant neoplasm of tonsil, unspecified  We will continue every 2 month follow-up visits until September                Jamey Houston MD    Filed: 03/20/24 8041     <Electronically signed by Jamey Houston MD> 03/20/24 9596

## 2024-05-29 ENCOUNTER — OFFICE VISIT (OUTPATIENT)
Dept: FAMILY MEDICINE | Facility: OTHER | Age: 64
End: 2024-05-29
Attending: NURSE PRACTITIONER
Payer: COMMERCIAL

## 2024-05-29 VITALS
BODY MASS INDEX: 34.67 KG/M2 | TEMPERATURE: 97.5 F | HEIGHT: 69 IN | OXYGEN SATURATION: 94 % | WEIGHT: 234.1 LBS | DIASTOLIC BLOOD PRESSURE: 80 MMHG | HEART RATE: 64 BPM | SYSTOLIC BLOOD PRESSURE: 122 MMHG | RESPIRATION RATE: 18 BRPM

## 2024-05-29 DIAGNOSIS — H57.89 EYE DISCHARGE: ICD-10-CM

## 2024-05-29 DIAGNOSIS — G44.219 EPISODIC TENSION-TYPE HEADACHE, NOT INTRACTABLE: ICD-10-CM

## 2024-05-29 DIAGNOSIS — H57.89 RED EYES: ICD-10-CM

## 2024-05-29 DIAGNOSIS — H10.33 ACUTE BACTERIAL CONJUNCTIVITIS OF BOTH EYES: Primary | ICD-10-CM

## 2024-05-29 DIAGNOSIS — H57.89 IRRITATION OF BOTH EYES: ICD-10-CM

## 2024-05-29 PROCEDURE — 99213 OFFICE O/P EST LOW 20 MIN: CPT

## 2024-05-29 RX ORDER — POLYMYXIN B SULFATE AND TRIMETHOPRIM 1; 10000 MG/ML; [USP'U]/ML
SOLUTION OPHTHALMIC
Qty: 10 ML | Refills: 0 | Status: SHIPPED | OUTPATIENT
Start: 2024-05-29 | End: 2024-06-05

## 2024-05-29 ASSESSMENT — PAIN SCALES - GENERAL: PAINLEVEL: NO PAIN (0)

## 2024-05-29 NOTE — NURSING NOTE
"Chief Complaint   Patient presents with    Eye Problem     Bilateral Eye Redness, Swelling x 3 Days        Initial /80 (BP Location: Right arm, Patient Position: Chair, Cuff Size: Adult Large)   Pulse 64   Temp 97.5  F (36.4  C) (Tympanic)   Resp 18   Ht 1.753 m (5' 9\")   Wt 106.2 kg (234 lb 1.6 oz)   SpO2 94%   BMI 34.57 kg/m   Estimated body mass index is 34.57 kg/m  as calculated from the following:    Height as of this encounter: 1.753 m (5' 9\").    Weight as of this encounter: 106.2 kg (234 lb 1.6 oz).      Medication Reconciliation: Complete.       Lucila Kimble LPN on 5/29/2024 at 11:58 AM     "

## 2024-05-29 NOTE — PATIENT INSTRUCTIONS
I hope you feel better soon Melchor!  Pinkeye: Care Instructions  Overview     Pinkeye is redness and swelling of the eye surface and the conjunctiva (the lining of the eyelid and the covering of the white part of the eye). Pinkeye is also called conjunctivitis. Pinkeye is often caused by infection with bacteria or a virus. Dry air, allergies, smoke, and chemicals are other common causes.  Pinkeye often gets better on its own in 7 to 10 days. Antibiotics only help if the pinkeye is caused by bacteria. Pinkeye caused by infection spreads easily. If an allergy or chemical is causing pinkeye, it will not go away unless you can avoid whatever is causing it.  Follow-up care is a key part of your treatment and safety. Be sure to make and go to all appointments, and call your doctor if you are having problems. It's also a good idea to know your test results and keep a list of the medicines you take.  How can you care for yourself at home?  Wash your hands often. Always wash them before and after you treat pinkeye or touch your eyes or face.  Use moist cotton or a clean, wet cloth to remove crust. Wipe from the inside corner of the eye to the outside. Use a clean part of the cloth for each wipe.  Put cold or warm wet cloths on your eye a few times a day if the eye hurts.  Do not wear contact lenses or eye makeup until the pinkeye is gone. Throw away any eye makeup you were using when you got pinkeye. Clean your contacts and storage case. If you wear disposable contacts, use a new pair when your eye has cleared and it is safe to wear contacts again.  If the doctor gave you antibiotic ointment or eyedrops, use them as directed. Use the medicine for as long as instructed, even if your eye starts looking better soon. Keep the bottle tip clean, and do not let it touch the eye area.  To put in eyedrops or ointment:  Tilt your head back, and pull your lower eyelid down with one finger.  Drop or squirt the medicine inside the lower  "lid.  Close your eye for 30 to 60 seconds to let the drops or ointment move around.  Do not touch the ointment or dropper tip to your eyelashes or any other surface.  Do not share towels, pillows, or washcloths while you have pinkeye.  When should you call for help?   Call your doctor now or seek immediate medical care if:    You have pain in your eye, not just irritation on the surface.     You have a change in vision or loss of vision.     You have an increase in discharge from the eye.     Your eye has not started to improve or begins to get worse within 48 hours after you start using antibiotics.     Pinkeye lasts longer than 7 days.   Watch closely for changes in your health, and be sure to contact your doctor if you have any problems.  Where can you learn more?  Go to https://[email or web address].net/patiented  Enter Y392 in the search box to learn more about \"Pinkeye: Care Instructions.\"  Current as of: June 5, 2023               Content Version: 14.0    3656-9947 [employer].   Care instructions adapted under license by your healthcare professional. If you have questions about a medical condition or this instruction, always ask your healthcare professional. [employer] disclaims any warranty or liability for your use of this information.      "

## 2024-05-29 NOTE — PROGRESS NOTES
ASSESSMENT/PLAN:    I have reviewed the nursing notes.  I have reviewed the findings, diagnosis, plan and need for follow up with the patient.    1. Episodic tension-type headache, not intractable  2. Red eyes  3. Irritation of both eyes  4. Eye discharge  5. Acute bacterial conjunctivitis of both eyes    - polymixin b-trimethoprim (POLYTRIM) 12400-2.1 UNIT/ML-% ophthalmic solution; 1 drop in affected eye(s) every 3 hrs while awake for 5-7 days until resolved - max 6 doses per day  Dispense: 10 mL; Refill: 0    - Please read the attached information on pinkeye for at home care instructions as discussed in the clinic today.    - May use over-the-counter Tylenol as needed for pain or fever.    - Discussed warning signs/symptoms indicative of need to f/u    - Follow up if symptoms persist or worsen or concerns    - I explained my diagnostic considerations and recommendations to the patient, who voiced understanding and agreement with the treatment plan. All questions were answered. We discussed potential side effects of any prescribed or recommended therapies, as well as expectations for response to treatments.    Manasa Braswell, СЕРГЕЙ CNP  5/29/2024  12:00 PM    HPI:    Melchor Sadler is a 63 year old male who presents to [facility] today for concerns of possible pinkeye.  Monday night left eye was weeping clear drainage, Tuesday switched to right eye, both eyes reddened now.  States both eyes were crusted shut and swollen this morning upon waking up.  Greenish discharge and crusting with irritation.  Has had a sinus headache the past 3-4 days.  Denies fever, shortness of breath, chest pain, rhinorrhea, congestion, sore throat, otalgia, nausea, vomiting, diarrhea.    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 Chart    Oropharyngeal cancer (H) 09/13/2022    Tonsil Cancer    GLENDA (obstructive sleep apnea) 09/22/2022    [facility]     Past Surgical  "History:   Procedure Laterality Date    INSERT PORT VASCULAR ACCESS N/A 11/11/2022    Procedure: Port-a-cath placement;  Surgeon: Teodoro Avila MD;  Location: [facility] OR    Panendoscopy, biopsy left palate  09/22/2022    [facility]-Dr. Jamey Houston     Social History     Tobacco Use    Smoking status: Never     Passive exposure: Past    Smokeless tobacco: Former     Types: Chew     Quit date: 1995    Tobacco comments:     Passive exposure in childhood home.    Substance Use Topics    Alcohol use: Not Currently     Current Outpatient Medications   Medication Sig Dispense Refill    GARLIC PO Take 1 tablet by mouth daily      magnesium oxide (MAG-OX) 400 MG tablet Take 400 mg by mouth daily      NONFORMULARY Vitamin C Po daily      Selenium 200 MCG TABS tablet Take 200 mcg by mouth daily      UNKNOWN TO PATIENT Vitamin D, unsure of dose      zinc gluconate 50 MG tablet Take 50 mg by mouth daily       Allergies   Allergen Reactions    Azithromycin Other (See Comments)     Panic attacks    Codeine Other (See Comments)     Anxiety    Penicillins Anxiety     Pt states his sister and father are allergic, but he doesn't believe he is. 11/11/2022     Past medical history, past surgical history, current medications and allergies reviewed and accurate to the best of my knowledge.      ROS:  Refer to HPI    /80 (BP Location: Right arm, Patient Position: Chair, Cuff Size: Adult Large)   Pulse 64   Temp 97.5  F (36.4  C) (Tympanic)   Resp 18   Ht 1.753 m (5' 9\")   Wt 106.2 kg (234 lb 1.6 oz)   SpO2 94%   BMI 34.57 kg/m      EXAM:  General Appearance: Well appearing 63 year old male, appropriate appearance for age. No acute distress   Eyes: conjunctivae abnormal with erythema and irritation, corneas clear, greenish colored drainage and crusting, no eyelid swelling, pupils equal   Nose:  Bilateral nares: no erythema, no edema, no drainage or congestion   Neck: supple without adenopathy  Respiratory: normal chest wall and " respirations.  Normal effort.  Clear to auscultation bilaterally, no wheezing, crackles or rhonchi.  No increased work of breathing.  No cough appreciated.  Cardiac: RRR with no murmurs  Musculoskeletal:  Equal movement of bilateral upper extremities.  Equal movement of bilateral lower extremities.  Normal gait.    Neuro: Alert and oriented to person, place, and time.    Psychological: normal affect, alert, oriented, and pleasant.

## 2024-06-18 ENCOUNTER — OFFICE VISIT (OUTPATIENT)
Dept: OTOLARYNGOLOGY | Facility: OTHER | Age: 64
End: 2024-06-18
Attending: OTOLARYNGOLOGY
Payer: COMMERCIAL

## 2024-06-18 DIAGNOSIS — C09.9 TONSIL CANCER (H): Primary | ICD-10-CM

## 2024-06-18 PROCEDURE — G0463 HOSPITAL OUTPT CLINIC VISIT: HCPCS

## 2024-06-28 NOTE — PROGRESS NOTES
document embedded image                                        [facility]                                                                                                                                                       Patient Name: Melchor Sadler   Address: [address] [place] [address]    YOB: 1960   [place] [id number]   MR Number: [id number]   Phone: [phone]  PCP: Gerda Evans MD           Appointment Date: 06/18/24  Visit Provider: Jamey Houston MD    cc: ~    ENT Progress Note        Intake  Visit Reasons: Cancer follow up    HPI  History of Present Illness  Chief complaint:  Follow-up head neck cancer    History  The patient is a 63-year-old man nose with a 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  He was treated with chemo and radiation in his done beautifully.  He presents today for routine follow-up without new complaint.      Exam   Oral cavity oropharynx-free of mucosal lesions or inflammation   Nasal-no obstruction or purulence   Neck-no palpable masses or adenopathy  Indirect laryngoscopy reveals Clear hypopharynx and larynx   Bimanual exam-no palpable tongue base or tonsillar fossa lesions   Head and neck integument-Clear  General-the patient appears well and in no distress  Neuro-there are no focal cranial nerve deficits    Allergies    codeine Adverse Reaction (Verified 09/22/22 12:35)  Anxiety  Penicillins Adverse Reaction (Verified 09/22/22 12:35)  Unknown    PFSH  PFSH:     Social History: (Reviewed 09/22/22 @ 14:45 by Jeramie Weaver MD)  Smoking Status:  Never smoker     A&P  Assessment & Plan  (1) Tonsil cancer:         Status: Acute        Code(s):  C09.9 - Malignant neoplasm of tonsil, unspecified  He will continue every other month follow-up visits until September.                Jamey Houston MD    Filed: 06/19/24 [id number]      <Electronically signed by Jamey Houston MD> 06/19/24 9183

## 2024-08-20 ENCOUNTER — OFFICE VISIT (OUTPATIENT)
Dept: OTOLARYNGOLOGY | Facility: OTHER | Age: 64
End: 2024-08-20
Attending: OTOLARYNGOLOGY
Payer: COMMERCIAL

## 2024-08-20 DIAGNOSIS — C09.9 MALIGNANT NEOPLASM OF TONSIL, UNSPECIFIED (H): Primary | ICD-10-CM

## 2024-08-20 PROCEDURE — G0463 HOSPITAL OUTPT CLINIC VISIT: HCPCS

## 2024-09-03 NOTE — PROGRESS NOTES
document embedded image                                   [facility]                                                                                                                                         Patient Name: Melchor Sadler   Address: [address] [place] [address]    YOB: 1960   [place] [id number]   MR Number: [id number]   Phone: [phone]  PCP: Gerda Evans MD           Appointment Date: 08/20/24  Visit Provider: Jamey Houston MD    cc: ~    ENT Progress Note        Intake  Visit Reasons: Cancer check    HPI  History of Present Illness  Chief complaint:  Follow-up head neck cancer    History  The patient is a 63-year-old man who was diagnosed with a 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  He was treated with chemotherapy and radiation therapy for definitive care.  He is here today completing his 1st 2 years of follow-up.  He has no new complaints.      Exam  Oral cavity oropharynx-free of mucosal lesions or inflammation   Neck-no palpable masses or adenopathy   Nasal-no obstruction or purulence   Indirect laryngoscopy reveals Clear hypopharynx and larynx  Bimanual exam-no palpable tongue base or tonsillar fossa lesions   And neck integument-Clear  General-the patient appears well and in no distress   Neuro-there are no focal cranial nerve deficits    Allergies    codeine Adverse Reaction (Verified 09/22/22 12:35)  Anxiety  Penicillins Adverse Reaction (Verified 09/22/22 12:35)  Unknown    PFSH  PFSH:     Social History: (Reviewed 09/22/22 @ 14:45 by Jeramie Weaver MD)  Smoking Status:  Never smoker     A&P  Assessment & Plan  (1) Tonsil cancer:         Status: Acute        Code(s):  C09.9 - Malignant neoplasm of tonsil, unspecified  We will extend follow-up to every 3 months in the coming year.                Jamey Houston MD    Filed: 08/21/24 [id number]      <Electronically signed by Jamey Houston MD> 08/21/24 4855

## 2024-11-30 ENCOUNTER — HEALTH MAINTENANCE LETTER (OUTPATIENT)
Age: 64
End: 2024-11-30

## (undated) DEVICE — SU PROLENE 3-0 SHDA 48" 8534H

## (undated) DEVICE — SLEEVE SCD EXPRESS KNEE LENGTH MED 9529

## (undated) DEVICE — SU VICRYL 3-0 SH 27" UND J416H

## (undated) DEVICE — DRAPE C-ARM 60X42" 1013

## (undated) DEVICE — GOWN SURG XL LVL 3 REINFORCED 9541

## (undated) DEVICE — COVER ULTRASOUND PROBE W/GEL FLEXI-FEEL 6"X58" LF  25-FF658

## (undated) DEVICE — COVER LT HANDLE 2/PK 5160-2FG

## (undated) DEVICE — SOL NACL 0.9% IRRIG 1000ML BOTTLE 2F7124

## (undated) DEVICE — SOL WATER IRRIG 1000ML BOTTLE 2F7114

## (undated) DEVICE — CANISTER SUCTION MEDI-VAC GUARDIAN 2000ML 90D 65651-220

## (undated) DEVICE — SUTURE BOOTS 051003PBX

## (undated) DEVICE — SU MONOCRYL 4-0 PS-2 18" UND Y496G

## (undated) DEVICE — PACK LAPAROTOMY CUSTOM SBA32LPMBG

## (undated) DEVICE — PREP CHLORAPREP 26ML TINTED HI-LITE ORANGE 930815

## (undated) DEVICE — GLOVE PROTEXIS POWDER FREE CLSC 7.5  2D72PL75X

## (undated) DEVICE — SYR 10ML SLIP TIP W/O NDL 303134

## (undated) DEVICE — DECANTER BAG 10-102

## (undated) DEVICE — PACK BASIN SET UP SUTCNBSBBA

## (undated) DEVICE — ADH SKIN CLOSURE PREMIERPRO EXOFIN MICOR HV 0.5ML 3471

## (undated) DEVICE — ESU GROUND PAD ADULT W/CORD E7507

## (undated) DEVICE — BLADE 15 RB BK SS STRL LF DISPLF DISP 371215

## (undated) DEVICE — LABEL STERILE PREPRINTED FOR OR FRRH01-2M

## (undated) RX ORDER — ONDANSETRON 2 MG/ML
INJECTION INTRAMUSCULAR; INTRAVENOUS
Status: DISPENSED
Start: 2022-11-11

## (undated) RX ORDER — IBUPROFEN 200 MG
TABLET ORAL
Status: DISPENSED
Start: 2022-07-22

## (undated) RX ORDER — GLYCOPYRROLATE 0.2 MG/ML
INJECTION, SOLUTION INTRAMUSCULAR; INTRAVENOUS
Status: DISPENSED
Start: 2022-11-11

## (undated) RX ORDER — DEXAMETHASONE SODIUM PHOSPHATE 10 MG/ML
INJECTION, SOLUTION INTRAMUSCULAR; INTRAVENOUS
Status: DISPENSED
Start: 2022-11-11

## (undated) RX ORDER — FENTANYL CITRATE 50 UG/ML
INJECTION, SOLUTION INTRAMUSCULAR; INTRAVENOUS
Status: DISPENSED
Start: 2022-11-11

## (undated) RX ORDER — PROPOFOL 10 MG/ML
INJECTION, EMULSION INTRAVENOUS
Status: DISPENSED
Start: 2022-11-11

## (undated) RX ORDER — LIDOCAINE HYDROCHLORIDE 10 MG/ML
INJECTION, SOLUTION EPIDURAL; INFILTRATION; INTRACAUDAL; PERINEURAL
Status: DISPENSED
Start: 2022-07-22

## (undated) RX ORDER — LIDOCAINE HYDROCHLORIDE 20 MG/ML
INJECTION, SOLUTION EPIDURAL; INFILTRATION; INTRACAUDAL; PERINEURAL
Status: DISPENSED
Start: 2022-11-11

## (undated) RX ORDER — GINSENG 100 MG
CAPSULE ORAL
Status: DISPENSED
Start: 2022-07-22

## (undated) RX ORDER — ACETAMINOPHEN 325 MG/1
TABLET ORAL
Status: DISPENSED
Start: 2022-07-22